# Patient Record
Sex: FEMALE | Race: WHITE | NOT HISPANIC OR LATINO | Employment: OTHER | URBAN - NONMETROPOLITAN AREA
[De-identification: names, ages, dates, MRNs, and addresses within clinical notes are randomized per-mention and may not be internally consistent; named-entity substitution may affect disease eponyms.]

---

## 2020-01-02 ENCOUNTER — OFFICE VISIT (OUTPATIENT)
Dept: FAMILY MEDICINE CLINIC | Facility: CLINIC | Age: 55
End: 2020-01-02

## 2020-01-02 VITALS
WEIGHT: 158.31 LBS | DIASTOLIC BLOOD PRESSURE: 74 MMHG | RESPIRATION RATE: 18 BRPM | HEIGHT: 61 IN | TEMPERATURE: 98.2 F | BODY MASS INDEX: 29.89 KG/M2 | SYSTOLIC BLOOD PRESSURE: 128 MMHG | HEART RATE: 79 BPM | OXYGEN SATURATION: 98 %

## 2020-01-02 DIAGNOSIS — Z23 NEED FOR INFLUENZA VACCINATION: ICD-10-CM

## 2020-01-02 DIAGNOSIS — K64.9 HEMORRHOIDS, UNSPECIFIED HEMORRHOID TYPE: ICD-10-CM

## 2020-01-02 DIAGNOSIS — Z00.00 ANNUAL PHYSICAL EXAM: ICD-10-CM

## 2020-01-02 DIAGNOSIS — I10 HYPERTENSION, UNSPECIFIED TYPE: ICD-10-CM

## 2020-01-02 DIAGNOSIS — I10 ESSENTIAL HYPERTENSION: ICD-10-CM

## 2020-01-02 DIAGNOSIS — M19.90 ARTHRITIS: ICD-10-CM

## 2020-01-02 DIAGNOSIS — F19.10 DRUG ABUSE, IV (HCC): Primary | ICD-10-CM

## 2020-01-02 PROCEDURE — 99203 OFFICE O/P NEW LOW 30 MIN: CPT | Performed by: STUDENT IN AN ORGANIZED HEALTH CARE EDUCATION/TRAINING PROGRAM

## 2020-01-02 PROCEDURE — 90686 IIV4 VACC NO PRSV 0.5 ML IM: CPT | Performed by: STUDENT IN AN ORGANIZED HEALTH CARE EDUCATION/TRAINING PROGRAM

## 2020-01-02 PROCEDURE — G0008 ADMIN INFLUENZA VIRUS VAC: HCPCS | Performed by: STUDENT IN AN ORGANIZED HEALTH CARE EDUCATION/TRAINING PROGRAM

## 2020-01-02 RX ORDER — LISINOPRIL 10 MG/1
10 TABLET ORAL 2 TIMES DAILY
COMMUNITY

## 2020-01-02 RX ORDER — MELOXICAM 7.5 MG/1
7.5 TABLET ORAL DAILY
Qty: 30 TABLET | Refills: 6 | Status: SHIPPED | OUTPATIENT
Start: 2020-01-02 | End: 2020-02-01

## 2020-01-02 RX ORDER — HYDROCHLOROTHIAZIDE 12.5 MG/1
12.5 TABLET ORAL DAILY
Qty: 30 TABLET | Refills: 0 | Status: SHIPPED | OUTPATIENT
Start: 2020-01-02 | End: 2020-02-01

## 2020-01-02 NOTE — PROGRESS NOTES
"                    Subjective   JudyConsuelo Escalante is a 54 y.o. female who presents for initial evaluation  for arthritis pain, annual labs, and to establish care.    Arthritis pain: Hanna has chronic back and hip pain. She endorses self-medicating for her pain which has included narcotics, methamphetamine and daily THC. She last used meth a few weeks ago. She has injected her drugs in the past. She says she has used aleve before for her pain but causes her stomach upset. She would like to get some pain medicine which is effective.     Annual labs: Hanna at first was requesting to have Hepatitis, HIV and annual labs drawn since she hadn't had these labs drawn in \"a long time.\" At the end of the visit, she said she had them drawn around [facility] in [place] and will sign release so we can get those here.    Establish care: Hanna recently moved from [place] back to [place] and is interested in establishing care. She would like to have a surgical consult to have her hemorrhoid removed.      Past Medical Hx:  Past Medical History:   Diagnosis Date   • Allergic    • Arthritis    • COPD (chronic obstructive pulmonary disease) (CMS/HCC)    • GERD (gastroesophageal reflux disease)    • Hypertension    • Infectious viral hepatitis    • Low back pain        Past Surgical Hx:  Past Surgical History:   Procedure Laterality Date   • CHOLECYSTECTOMY     • HYSTERECTOMY         Health Maintenance:  Health Maintenance   Topic Date Due   • MAMMOGRAM  1965   • TDAP/TD VACCINES (1 - Tdap) 09/30/1974   • PNEUMOCOCCAL VACCINE (19-64 MEDIUM RISK) (1 of 1 - PPSV23) 09/30/1982   • ZOSTER VACCINE (1 of 2) 09/30/2013   • INFLUENZA VACCINE  08/01/2019   • HEPATITIS C SCREENING  01/02/2020   • MEDICARE ANNUAL WELLNESS  01/02/2020   • PAP SMEAR  01/02/2020   • COLONOSCOPY  01/02/2020       Current Meds:    Current Outpatient Medications:   •  lisinopril (PRINIVIL,ZESTRIL) 10 MG tablet, Take 10 mg by mouth 2 (Two) Times a Day., " Disp: , Rfl:   •  hydroCHLOROthiazide (HYDRODIURIL) 12.5 MG tablet, Take 1 tablet by mouth Daily for 30 days., Disp: 30 tablet, Rfl: 0  •  meloxicam (MOBIC) 7.5 MG tablet, Take 1 tablet by mouth Daily for 30 days., Disp: 30 tablet, Rfl: 6  No current facility-administered medications for this visit.     Allergies:  Patient has no known allergies.    Family Hx:  Family History   Problem Relation Age of Onset   • Hypertension Mother    • Heart disease Mother    • Diabetes Father    • Cancer Father         Social History:  Social History     Socioeconomic History   • Marital status: Unknown     Spouse name: Not on file   • Number of children: Not on file   • Years of education: Not on file   • Highest education level: Not on file   Tobacco Use   • Smoking status: Current Every Day Smoker     Packs/day: 1.00     Types: Cigarettes   • Smokeless tobacco: Never Used   Substance and Sexual Activity   • Alcohol use: Not Currently   • Drug use: Yes     Types: Marijuana       Review of Systems  Review of Systems   Constitutional: Negative for activity change and appetite change.   HENT: Negative for congestion and ear pain.    Eyes: Negative for pain and discharge.   Respiratory: Negative for chest tightness and shortness of breath.    Cardiovascular: Negative for chest pain and palpitations.   Gastrointestinal: Negative for abdominal distention and abdominal pain.   Endocrine: Negative for cold intolerance and heat intolerance.   Genitourinary: Negative for difficulty urinating and dysuria.   Musculoskeletal: Positive for back pain and myalgias. Negative for arthralgias.   Skin: Negative for color change and rash.   Allergic/Immunologic: Negative for environmental allergies and food allergies.   Neurological: Negative for dizziness and headaches.   Hematological: Negative for adenopathy. Does not bruise/bleed easily.   Psychiatric/Behavioral: Negative for agitation and confusion.            Objective:     /74 (BP  "Location: Left arm, Patient Position: Sitting, Cuff Size: Adult)   Pulse 79   Temp 98.2 °F (36.8 °C) (Tympanic)   Resp 18   Ht 154.9 cm (61\")   Wt 71.8 kg (158 lb 5 oz)   SpO2 98%   BMI 29.91 kg/m²       Physical Exam   Constitutional: She is oriented to person, place, and time. She appears well-developed and well-nourished.   HENT:   Head: Normocephalic and atraumatic.   Eyes: Pupils are equal, round, and reactive to light. EOM are normal.   Neck: No JVD present. No tracheal deviation present. No thyromegaly present.   Cardiovascular: Normal rate, S1 normal, S2 normal, normal heart sounds and intact distal pulses.   Pulses:       Radial pulses are 2+ on the right side, and 2+ on the left side.        Dorsalis pedis pulses are 2+ on the right side, and 2+ on the left side.   Pulmonary/Chest: Effort normal and breath sounds normal.   Abdominal: Bowel sounds are normal.   Musculoskeletal: Normal range of motion.   Neurological: She is alert and oriented to person, place, and time. GCS eye subscore is 4. GCS verbal subscore is 5. GCS motor subscore is 6.   Skin: Skin is warm and dry. Capillary refill takes 2 to 3 seconds.   Psychiatric: She has a normal mood and affect. Her speech is normal and behavior is normal. Thought content normal.       Assessment/Plan:     Hanna was seen today for establish care.    Diagnoses and all orders for this visit:    Drug abuse, IV (CMS/[facility])  - Record release to be signed today for labs done at [facility].    Annual physical exam  - She has had her labs completed at [facility]. Will review those when available and order those which have not been done.   Essential hypertension  -     hydroCHLOROthiazide (HYDRODIURIL) 12.5 MG tablet; Take 1 tablet by mouth Daily for 30 days.    Arthritis  -     meloxicam (MOBIC) 7.5 MG tablet; Take 1 tablet by mouth Daily for 30 days.    Hemorrhoids, unspecified hemorrhoid type  -     Ambulatory Referral to General Surgery    Need for influenza " vaccination    -     influenza vac split quad (FLUZONE,FLUARIX,AFLURIA) injection 0.5 mL    Hypertension, unspecified type         Follow-up:     Return in about 2 weeks (around 1/16/2020) for Recheck.    Goals    Stay off of drugs         Preventative:    Vaccines Recommended at this visit:   Influenza    Vaccines Received at this visit:  Influenza    Screenings Recommended at this visit:  No Screenings offered today. Patient is up to date on all screenings at this time.     Screenings Ordered at this visit:  No screenings were offered today. Patient is up to date on all screenings.     Smoking Status:  Patient is current smoker. Patient is not interested in smoking cessation.    Alcohol Intake:  Occasional/rare    Patient's Body mass index is 29.91 kg/m². BMI is within normal parameters. No follow-up required..         RISK SCORE: 3

## 2020-01-02 NOTE — PATIENT INSTRUCTIONS
Sign the release for medical records for your labs from last month. Start taking the new medications and come back in 2 weeks and we will re-evaluate your BP and pain then.   Smoking and Musculoskeletal Health  Smoking is bad for your health. Most people know that smoking causes lung disease, heart disease, and cancer. But people may not realize that it also affects their bones, muscles, and joints (musculoskeletal system). When you smoke, the effects on your lungs and heart result in less oxygen for your musculoskeletal system. This can lead to poor bone and joint health.  How can smoking affect my musculoskeletal health?  Smoking can:  · Increase your risk of having weak, thin bones (osteoporosis). Elderly smokers are at higher risk for bone fractures related to osteoporosis.  · Decrease the ability of bone-forming cells to make and replace bone (in addition to reducing oxygen and blood flow).  · Reduce your body's ability to absorb calcium from your diet. Less calcium means weaker bones.  · Interfere with the breakdown of the female hormone estrogen. Smoking lowers estrogen, which is a hormone that helps keep bones strong. Women who smoke may have earlier menopause. Menopause is a risk factor for osteoporosis.  · Weaken the tissues that attach bones to muscles (tendons). This can lead to shoulder, back, and other joint injuries.  · Increase your risk of rheumatoid arthritis or make the condition worse if you already have it.  · Slow down healing and increase your risk of infection and other complications if you have a bone fracture or surgery that involves your musculoskeletal system.  · Make you get out of breath easily. This can keep you from getting the exercise you need to keep your bones and joints healthy.  · Decrease your appetite and body mass. You may lose weight and muscle strength. This can put you at higher risk for muscle injury, joint injury, and broken bones.  What actions can I take to prevent  musculoskeletal problems?  Quit smoking         · Do not start smoking. Quit if you already do. Even stopping later in life can improve musculoskeletal health.  · Do not use any products that contain nicotine or tobacco. Do not replace cigarette smoking with e-cigarettes. The safety of e-cigarettes is not known, and some may contain harmful chemicals.  · Make a plan to quit smoking and commit to it. Look for programs to help you, and ask your health care provider for recommendations and ideas.  · Talk with your health care provider about using nicotine replacement medicines to help you quit, such as gum, lozenges, patches, sprays, or pills.  Make other lifestyle changes    · Eat a healthy diet that includes calcium and vitamin D. These nutrients are important for bone health.  ? Calcium is found in dairy foods and green leafy vegetables.  ? Vitamin D is found in eggs, fish, and liver.  ? Many foods also have vitamin D and calcium added to them (are fortified).  ? Ask your health care provider if you would benefit from taking a supplement.  · Get out in the sunshine for a short time every day. This increases production of vitamin D.  · Get 30 minutes of exercise at least 5 days a week. Weight-bearing and strength exercises are best for musculoskeletal health. Ask your health care provider what type of exercise is safe for you.  · Do not drink alcohol if:  ? Your health care provider tells you not to drink.  ? You are pregnant, may be pregnant, or are planning to become pregnant.  · If you drink alcohol, limit how much you have:  ? 0-1 drink a day for women.  ? 0-2 drinks a day for men.  · Be aware of how much alcohol is in your drink. In the [place] one drink equals one 12 oz bottle of beer (355 mL), one 5 oz glass of wine (148 mL), or one 1½ oz glass of hard liquor (44 mL).  Where to find more information  You may find more information about smoking, musculoskeletal health, and quitting smoking from:  · American  Academy of Orthopaedic Surgeons: orthoinfo.aaos.org  · National Institutes of Health, Osteoporosis and Related Bone Diseases National Resource Center: bones.nih.gov  · HelpGuide.org: helpguide.org  · Smokefree.gov: smokefree.gov  · American Lung Association: lung.org  Contact a health care provider if:  · You need help to quit smoking.  Summary  · When you smoke, the effects on your lungs and heart result in less oxygen for your musculoskeletal system.  · Even stopping smoking later in life can improve musculoskeletal health.  · Do not use any products that contain nicotine or tobacco, such as cigarettes and e-cigarettes.  · If you need help quitting, ask your health care provider.  This information is not intended to replace advice given to you by your health care provider. Make sure you discuss any questions you have with your health care provider.  Document Released: 04/15/2019 Document Revised: 04/15/2019 Document Reviewed: 04/15/2019  [employer] Interactive Patient Education © 2019 [employer]

## 2020-01-09 ENCOUNTER — CONSULT (OUTPATIENT)
Dept: SURGERY | Facility: CLINIC | Age: 55
End: 2020-01-09

## 2020-01-09 VITALS
DIASTOLIC BLOOD PRESSURE: 90 MMHG | HEIGHT: 61 IN | BODY MASS INDEX: 30.78 KG/M2 | SYSTOLIC BLOOD PRESSURE: 142 MMHG | TEMPERATURE: 99.1 F | HEART RATE: 84 BPM | WEIGHT: 163 LBS

## 2020-01-09 DIAGNOSIS — K64.5 THROMBOSED EXTERNAL HEMORRHOID: ICD-10-CM

## 2020-01-09 DIAGNOSIS — Z12.11 SCREENING FOR MALIGNANT NEOPLASM OF COLON: Primary | ICD-10-CM

## 2020-01-09 PROCEDURE — 99203 OFFICE O/P NEW LOW 30 MIN: CPT | Performed by: SURGERY

## 2020-01-09 RX ORDER — DEXTROSE AND SODIUM CHLORIDE 5; .45 G/100ML; G/100ML
30 INJECTION, SOLUTION INTRAVENOUS CONTINUOUS PRN
Status: CANCELLED | OUTPATIENT
Start: 2020-01-21

## 2020-01-10 PROBLEM — Z12.11 SCREENING FOR MALIGNANT NEOPLASM OF COLON: Status: ACTIVE | Noted: 2020-01-10

## 2020-01-16 ENCOUNTER — OFFICE VISIT (OUTPATIENT)
Dept: FAMILY MEDICINE CLINIC | Facility: CLINIC | Age: 55
End: 2020-01-16

## 2020-01-16 VITALS
TEMPERATURE: 97.4 F | HEART RATE: 95 BPM | SYSTOLIC BLOOD PRESSURE: 128 MMHG | HEIGHT: 61 IN | WEIGHT: 163.5 LBS | DIASTOLIC BLOOD PRESSURE: 82 MMHG | BODY MASS INDEX: 30.87 KG/M2 | OXYGEN SATURATION: 98 %

## 2020-01-16 DIAGNOSIS — G47.00 INSOMNIA, UNSPECIFIED TYPE: ICD-10-CM

## 2020-01-16 DIAGNOSIS — F17.200 TOBACCO DEPENDENCE: ICD-10-CM

## 2020-01-16 DIAGNOSIS — R52 COMPLAINTS OF TOTAL BODY PAIN: Primary | ICD-10-CM

## 2020-01-16 PROCEDURE — 99213 OFFICE O/P EST LOW 20 MIN: CPT | Performed by: STUDENT IN AN ORGANIZED HEALTH CARE EDUCATION/TRAINING PROGRAM

## 2020-01-16 RX ORDER — LANOLIN ALCOHOL/MO/W.PET/CERES
3 CREAM (GRAM) TOPICAL NIGHTLY PRN
Qty: 30 TABLET | Refills: 1 | Status: SHIPPED | OUTPATIENT
Start: 2020-01-16 | End: 2020-02-15

## 2020-01-16 RX ORDER — TRAZODONE HYDROCHLORIDE 50 MG/1
50 TABLET ORAL NIGHTLY
Qty: 30 TABLET | Refills: 1 | Status: SHIPPED | OUTPATIENT
Start: 2020-01-16 | End: 2020-01-16

## 2020-01-16 RX ORDER — HYDROXYZINE PAMOATE 100 MG/1
100 CAPSULE ORAL NIGHTLY
Qty: 30 CAPSULE | Refills: 0 | Status: SHIPPED | OUTPATIENT
Start: 2020-01-16 | End: 2020-02-15

## 2020-01-16 NOTE — PROGRESS NOTES
"                    Subjective   JudyConsuelo Escalante is a 54 y.o. female who presents for follow up for insomnia, all-over body pain, and a pulmonology consult.    Insomnia: She continues to not be able to turn her brain off to sleep. She endorses practicing good sleep hygiene such as having no TV on, dark room and getting a bedtime routine. She has tried benadryl and trazodone in the past with better results with trazodone.    All-Over Body pain: She continues to endorse all-over body pain and says she can \"feel everything\" since she has gotten clean from illicit drug use. She says the mobic makes her stomach a bit upset, and has difficulty sleeping at night with the pain. She again asked for narcotics.    Pulmonology consult: She is a lifetime smoker and uses an albuterol inhaler at home for shortness of breath. She has never seen a pulmonologist or had a PFT.      Past Medical Hx:  Past Medical History:   Diagnosis Date   • Allergic    • Arthritis    • COPD (chronic obstructive pulmonary disease) (CMS/HCC)    • GERD (gastroesophageal reflux disease)    • Hypertension    • Infectious viral hepatitis    • Low back pain        Past Surgical Hx:  Past Surgical History:   Procedure Laterality Date   • CHOLECYSTECTOMY     • HYSTERECTOMY         Health Maintenance:  Health Maintenance   Topic Date Due   • MAMMOGRAM  1965   • PNEUMOCOCCAL VACCINE (19-64 MEDIUM RISK) (1 of 1 - PPSV23) 09/30/1982   • HEPATITIS C SCREENING  01/02/2020   • MEDICARE ANNUAL WELLNESS  01/02/2020   • PAP SMEAR  01/02/2020   • ZOSTER VACCINE (1 of 2) 01/16/2020 (Originally 9/30/2013)   • COLONOSCOPY  01/21/2020 (Originally 1/2/2020)   • TDAP/TD VACCINES (1 - Tdap) 01/16/2021 (Originally 9/30/1974)   • INFLUENZA VACCINE  Completed       Current Meds:    Current Outpatient Medications:   •  hydroCHLOROthiazide (HYDRODIURIL) 12.5 MG tablet, Take 1 tablet by mouth Daily for 30 days., Disp: 30 tablet, Rfl: 0  •  lisinopril (PRINIVIL,ZESTRIL) 10 MG " tablet, Take 10 mg by mouth 2 (Two) Times a Day., Disp: , Rfl:   •  meloxicam (MOBIC) 7.5 MG tablet, Take 1 tablet by mouth Daily for 30 days., Disp: 30 tablet, Rfl: 6  •  hydrOXYzine pamoate (VISTARIL) 100 MG capsule, Take 1 capsule by mouth Every Night for 30 days., Disp: 30 capsule, Rfl: 0  •  melatonin 3 MG tablet, Take 1 tablet by mouth At Night As Needed for Sleep for up to 30 days., Disp: 30 tablet, Rfl: 1    Allergies:  Patient has no known allergies.    Family Hx:  Family History   Problem Relation Age of Onset   • Hypertension Mother    • Heart disease Mother    • Diabetes Father    • Cancer Father         Social History:  Social History     Socioeconomic History   • Marital status: Unknown     Spouse name: Not on file   • Number of children: Not on file   • Years of education: Not on file   • Highest education level: Not on file   Tobacco Use   • Smoking status: Current Every Day Smoker     Packs/day: 1.00     Types: Cigarettes   • Smokeless tobacco: Never Used   Substance and Sexual Activity   • Alcohol use: Not Currently   • Drug use: Yes     Types: Marijuana       Review of Systems  Review of Systems   Constitutional: Negative for activity change and appetite change.   HENT: Negative for congestion and ear pain.    Eyes: Negative for pain and discharge.   Respiratory: Positive for wheezing (at times). Negative for chest tightness and shortness of breath.    Cardiovascular: Negative for chest pain and palpitations.   Gastrointestinal: Negative for abdominal distention and abdominal pain.   Endocrine: Negative for cold intolerance and heat intolerance.   Genitourinary: Negative for difficulty urinating and dysuria.   Musculoskeletal: Positive for arthralgias, back pain, gait problem and myalgias.   Skin: Negative for color change and rash.   Allergic/Immunologic: Negative for environmental allergies and food allergies.   Neurological: Negative for dizziness and headaches.   Hematological: Negative for  "adenopathy. Does not bruise/bleed easily.   Psychiatric/Behavioral: Negative for agitation and confusion.            Objective:     /82 (BP Location: Left arm, Patient Position: Sitting)   Pulse 95   Temp 97.4 °F (36.3 °C) (Tympanic)   Ht 154.9 cm (61\")   Wt 74.2 kg (163 lb 8 oz)   SpO2 98%   BMI 30.89 kg/m²       Physical Exam   Constitutional: She is oriented to person, place, and time. She appears well-developed and well-nourished.   HENT:   Head: Normocephalic and atraumatic.   Eyes: Pupils are equal, round, and reactive to light. EOM are normal.   Neck: No JVD present. No tracheal deviation present. No thyromegaly present.   Cardiovascular: Normal rate, S1 normal, S2 normal, normal heart sounds and intact distal pulses.   Pulses:       Radial pulses are 2+ on the right side, and 2+ on the left side.        Dorsalis pedis pulses are 2+ on the right side, and 2+ on the left side.   Pulmonary/Chest: Effort normal and breath sounds normal.   Abdominal: Bowel sounds are normal.   Musculoskeletal: Normal range of motion.   Neurological: She is alert and oriented to person, place, and time. GCS eye subscore is 4. GCS verbal subscore is 5. GCS motor subscore is 6.   Skin: Skin is warm and dry. Capillary refill takes 2 to 3 seconds.   Psychiatric: She has a normal mood and affect. Her speech is normal and behavior is normal. Thought content normal.       Assessment/Plan:     Hanna was seen today for addiction problem and arthritis. She will schedule another appointment for a PAP and Medicare wellness exam. She will sign the med release for labs she recently had drawn.     Diagnoses and all orders for this visit:    Complaints of total body pain   - Informed her we don't prescribe narcotics. She has not had good results from gabapentin in the past.  Tobacco dependence  -     Ambulatory Referral to Pulmonology to obtain PFTs and get recommendations for her suspected COPD    Insomnia, unspecified type  - Try the " new medications and keep practicing good sleep hygiene.   -     hydrOXYzine pamoate (VISTARIL) 100 MG capsule; Take 1 capsule by mouth Every Night for 30 days.  -     melatonin 3 MG tablet; Take 1 tablet by mouth At Night As Needed for Sleep for up to 30 days.         Follow-up:     No follow-ups on file.    Goals    Pain relief,          Preventative:    Vaccines Recommended at this visit:   No Vaccines recommended today. Patient is up to date on all vaccines.     Vaccines Received at this visit:  No Vaccines recommended today. Patient is up to date on all vaccines.     Screenings Recommended at this visit:  PAP Smear    Screenings Ordered at this visit:  PAP Smear    Smoking Status:  Patient is current smoker. Patient is not interested in smoking cessation.    Alcohol Intake:  Occasional/rare    Patient's Body mass index is 30.89 kg/m². BMI is above normal parameters. Recommendations include: exercise counseling.         RISK SCORE: 3   Koby Chao MD PGY-1    This document has been electronically signed by Koby Chao MD on January 16, 2020 4:42 PM

## 2020-01-21 ENCOUNTER — ANESTHESIA EVENT (OUTPATIENT)
Dept: GASTROENTEROLOGY | Facility: HOSPITAL | Age: 55
End: 2020-01-21

## 2020-01-21 ENCOUNTER — ANESTHESIA (OUTPATIENT)
Dept: GASTROENTEROLOGY | Facility: HOSPITAL | Age: 55
End: 2020-01-21

## 2020-01-21 ENCOUNTER — HOSPITAL ENCOUNTER (OUTPATIENT)
Facility: HOSPITAL | Age: 55
Setting detail: HOSPITAL OUTPATIENT SURGERY
Discharge: HOME | End: 2020-01-21
Attending: SURGERY | Admitting: SURGERY

## 2020-01-21 VITALS
BODY MASS INDEX: 30.28 KG/M2 | TEMPERATURE: 97.1 F | HEIGHT: 61 IN | DIASTOLIC BLOOD PRESSURE: 67 MMHG | OXYGEN SATURATION: 96 % | WEIGHT: 160.38 LBS | SYSTOLIC BLOOD PRESSURE: 93 MMHG | RESPIRATION RATE: 18 BRPM | HEART RATE: 74 BPM

## 2020-01-21 DIAGNOSIS — Z12.11 SCREENING FOR MALIGNANT NEOPLASM OF COLON: ICD-10-CM

## 2020-01-21 PROCEDURE — 25010000002 PROPOFOL 10 MG/ML EMULSION: Performed by: NURSE ANESTHETIST, CERTIFIED REGISTERED

## 2020-01-21 PROCEDURE — G0121 COLON CA SCRN NOT HI RSK IND: HCPCS | Performed by: SURGERY

## 2020-01-21 RX ORDER — DEXTROSE AND SODIUM CHLORIDE 5; .45 G/100ML; G/100ML
30 INJECTION, SOLUTION INTRAVENOUS CONTINUOUS PRN
Status: DISCONTINUED | OUTPATIENT
Start: 2020-01-21 | End: 2020-01-21 | Stop reason: HOSPADM

## 2020-01-21 RX ORDER — LIDOCAINE HYDROCHLORIDE 20 MG/ML
INJECTION, SOLUTION EPIDURAL; INFILTRATION; INTRACAUDAL; PERINEURAL AS NEEDED
Status: DISCONTINUED | OUTPATIENT
Start: 2020-01-21 | End: 2020-01-21 | Stop reason: SURG

## 2020-01-21 RX ORDER — PROPOFOL 10 MG/ML
VIAL (ML) INTRAVENOUS AS NEEDED
Status: DISCONTINUED | OUTPATIENT
Start: 2020-01-21 | End: 2020-01-21 | Stop reason: SURG

## 2020-01-21 RX ADMIN — PROPOFOL 50 MG: 10 INJECTION, EMULSION INTRAVENOUS at 08:49

## 2020-01-21 RX ADMIN — PROPOFOL 20 MG: 10 INJECTION, EMULSION INTRAVENOUS at 08:55

## 2020-01-21 RX ADMIN — PROPOFOL 30 MG: 10 INJECTION, EMULSION INTRAVENOUS at 08:57

## 2020-01-21 RX ADMIN — DEXTROSE AND SODIUM CHLORIDE 30 ML/HR: 5; 450 INJECTION, SOLUTION INTRAVENOUS at 08:11

## 2020-01-21 RX ADMIN — PROPOFOL 20 MG: 10 INJECTION, EMULSION INTRAVENOUS at 09:00

## 2020-01-21 RX ADMIN — PROPOFOL 50 MG: 10 INJECTION, EMULSION INTRAVENOUS at 08:53

## 2020-01-21 RX ADMIN — LIDOCAINE HYDROCHLORIDE 50 MG: 20 INJECTION, SOLUTION EPIDURAL; INFILTRATION; INTRACAUDAL; PERINEURAL at 08:49

## 2020-01-21 NOTE — ANESTHESIA POSTPROCEDURE EVALUATION
Patient: Hanna Escalante    Procedure Summary     Date:  01/21/20 Room / Location:  [facility] ENDOSCOPY 1 / [facility] ENDOSCOPY    Anesthesia Start:  0846 Anesthesia Stop:  0907    Procedure:  COLONOSCOPY (N/A ) Diagnosis:       Screening for malignant neoplasm of colon      (Screening for malignant neoplasm of colon [Z12.11])    Surgeon:  Joshua Perry MD Provider:  Saloni Taylor CRNA    Anesthesia Type:  MAC ASA Status:  3          Anesthesia Type: MAC    Vitals  No vitals data found for the desired time range.          Post Anesthesia Care and Evaluation    Patient location during evaluation: bedside  Patient participation: complete - patient participated  Level of consciousness: sleepy but conscious  Pain score: 0  Pain management: adequate  Airway patency: patent  Anesthetic complications: No anesthetic complications  PONV Status: none  Cardiovascular status: acceptable and hemodynamically stable  Respiratory status: acceptable  Hydration status: acceptable  Post Neuraxial Block status: Motor and sensory function returned to baseline

## 2020-01-21 NOTE — ANESTHESIA PREPROCEDURE EVALUATION
Anesthesia Evaluation     Patient summary reviewed   NPO Solid Status: > 8 hours  NPO Liquid Status: > 4 hours           Airway   Mallampati: I  TM distance: >3 FB  Neck ROM: full  No difficulty expected  Dental    (+) edentulous    Pulmonary - normal exam   (+) a smoker Current Abstained day of surgery, COPD mild,   Cardiovascular - normal exam  Exercise tolerance: good (4-7 METS)    (+) hypertension well controlled,       Neuro/Psych- negative ROS  GI/Hepatic/Renal/Endo    (+)  GERD,  hepatitis, liver disease,     Musculoskeletal     Abdominal  - normal exam   Substance History      OB/GYN          Other   arthritis,                    Anesthesia Plan    ASA 3     MAC     intravenous induction     Anesthetic plan, all risks, benefits, and alternatives have been provided, discussed and informed consent has been obtained with: patient.    Plan discussed with CRNA.

## 2020-01-21 NOTE — H&P
Chief Complaint: Need for Screening Colonoscopy, hemorrhoids    Hanna Escalante is a 54 y.o. female referred today for evaluation for colonoscopy.  She notes no change in bowel habits, no blood in the stool. She was seen in the office for possible hemorrhoids and potential prolapse.    Prior Colonoscopy:no  Prior Polyps:no  Family History of Colon Cancer:no  On anticoagulation/antiplatelets:no    Past Surgical History:   Procedure Laterality Date   • CHOLECYSTECTOMY     • HYSTERECTOMY       Past Medical History:   Diagnosis Date   • Allergic    • Arthritis    • COPD (chronic obstructive pulmonary disease) (CMS/HCC)    • GERD (gastroesophageal reflux disease)    • Hypertension    • Infectious viral hepatitis    • Low back pain      Social History     Socioeconomic History   • Marital status: Single     Spouse name: Not on file   • Number of children: Not on file   • Years of education: Not on file   • Highest education level: Not on file   Tobacco Use   • Smoking status: Current Every Day Smoker     Packs/day: 1.00     Types: Cigarettes   • Smokeless tobacco: Never Used   Substance and Sexual Activity   • Alcohol use: Not Currently   • Drug use: Yes     Types: Marijuana     No current facility-administered medications on file prior to encounter.      Current Outpatient Medications on File Prior to Encounter   Medication Sig Dispense Refill   • hydroCHLOROthiazide (HYDRODIURIL) 12.5 MG tablet Take 1 tablet by mouth Daily for 30 days. 30 tablet 0   • lisinopril (PRINIVIL,ZESTRIL) 10 MG tablet Take 10 mg by mouth 2 (Two) Times a Day.     • meloxicam (MOBIC) 7.5 MG tablet Take 1 tablet by mouth Daily for 30 days. 30 tablet 6     No Known Allergies      Review of Systems   Constitutional: Negative for chills and fever.   Gastrointestinal: Negative for abdominal pain, blood in stool, constipation and diarrhea.       Vitals:    01/21/20 0805   BP: 120/80   Pulse: 83   Resp: 18   Temp: 97.4 °F (36.3 °C)   SpO2: 98%        Physical Exam:       General Appearance:    Alert, cooperative, in no acute distress   Head:    Normocephalic, without obvious abnormality, atraumatic   Eyes:            Lids and lashes normal, conjunctivae and sclerae normal, no   icterus, no pallor, corneas clear   Ears:    Ears appear intact with no abnormalities noted   Neck:   Trachea midline   Lungs:     Clear to auscultation,respirations regular, even and                  unlabored    Heart:    Regular rhythm and normal rate, normal S1 and S2, no            murmur, no gallop, no rub, no click   Abdomen:     Soft, nontender   Extremities:   Moves all extremities well, no edema, no cyanosis, no             redness   Pulses:   Pulses palpable and equal bilaterally   Neurologic:   Cranial nerves 2 - 12 grossly intact, sensation intact       Assessment     In need of screening colonoscopy.    Plan     Risks, benefits, rationale and prep for colonoscopy have been discussed with the patient.  The patient indicates understanding of these issues and agrees with the plan.          This document has been electronically signed by Joshua Perry MD on January 21, 2020 8:42 AM

## 2020-01-27 NOTE — PROGRESS NOTES
CHIEF COMPLAINT:    Hemorrhoids    HISTORY OF PRESENT ILLNESS:    Hanna Escalante is a 54 y.o. female who presents with approximately 3 weeks of perianal pain which is resolving.  She was told that she likely has an external hemorrhoid causing the symptoms and was referred to me for further care.  She has not had a prior colonoscopy.  She reports no blood in her stool.  She does have constant pain in the perianal region again which is resolving.  The pain is typically worse after bowel movements.    Past Medical History:   Diagnosis Date   • Allergic    • Arthritis    • COPD (chronic obstructive pulmonary disease) (CMS/HCC)    • GERD (gastroesophageal reflux disease)    • Hypertension    • Infectious viral hepatitis    • Low back pain        Past Surgical History:   Procedure Laterality Date   • CHOLECYSTECTOMY     • COLONOSCOPY N/A 1/21/2020    Procedure: COLONOSCOPY;  Surgeon: Joshua Perry MD;  Location: [facility] ENDOSCOPY;  Service: General   • HYSTERECTOMY         Prior to Admission medications    Medication Sig Start Date End Date Taking? Authorizing Provider   hydroCHLOROthiazide (HYDRODIURIL) 12.5 MG tablet Take 1 tablet by mouth Daily for 30 days. 1/2/20 2/1/20 Yes Alin Molina MD   lisinopril (PRINIVIL,ZESTRIL) 10 MG tablet Take 10 mg by mouth 2 (Two) Times a Day.   Yes Provider, MD Lianet   hydrOXYzine pamoate (VISTARIL) 100 MG capsule Take 1 capsule by mouth Every Night for 30 days. 1/16/20 2/15/20  Juwan Wei MD   melatonin 3 MG tablet Take 1 tablet by mouth At Night As Needed for Sleep for up to 30 days. 1/16/20 2/15/20  Juwan Wei MD   meloxicam (MOBIC) 7.5 MG tablet Take 1 tablet by mouth Daily for 30 days. 1/2/20 2/1/20  Alin Molina MD       No Known Allergies    Family History   Problem Relation Age of Onset   • Hypertension Mother    • Heart disease Mother    • Diabetes Father    • Cancer Father        Social History     Socioeconomic History   • Marital  "status: Single     Spouse name: Not on file   • Number of children: Not on file   • Years of education: Not on file   • Highest education level: Not on file   Tobacco Use   • Smoking status: Current Every Day Smoker     Packs/day: 1.00     Types: Cigarettes   • Smokeless tobacco: Never Used   Substance and Sexual Activity   • Alcohol use: Not Currently   • Drug use: Yes     Types: Marijuana       Review of Systems   Constitutional: Negative for appetite change, chills, fever and unexpected weight change.   HENT: Negative for hearing loss, nosebleeds and trouble swallowing.    Eyes: Negative for visual disturbance.   Respiratory: Negative for apnea, cough, choking, chest tightness, shortness of breath, wheezing and stridor.    Cardiovascular: Negative for chest pain, palpitations and leg swelling.   Gastrointestinal: Positive for rectal pain. Negative for abdominal distention, abdominal pain, blood in stool, constipation, diarrhea, nausea and vomiting.   Endocrine: Negative for cold intolerance, heat intolerance, polydipsia, polyphagia and polyuria.   Genitourinary: Negative for difficulty urinating, dysuria, frequency, hematuria and urgency.   Musculoskeletal: Negative for arthralgias, back pain, myalgias and neck pain.   Skin: Negative for color change, pallor and rash.   Allergic/Immunologic: Negative for immunocompromised state.   Neurological: Negative for dizziness, seizures, syncope, light-headedness, numbness and headaches.   Hematological: Negative for adenopathy.   Psychiatric/Behavioral: Negative for suicidal ideas. The patient is not nervous/anxious.        Objective     /90   Pulse 84   Temp 99.1 °F (37.3 °C) (Oral)   Ht 154.9 cm (61\")   Wt 73.9 kg (163 lb)   BMI 30.80 kg/m²     Physical Exam   Constitutional: She is oriented to person, place, and time. She appears well-developed and well-nourished. No distress.   HENT:   Head: Normocephalic and atraumatic.   Eyes: Pupils are equal, round, and " reactive to light. Conjunctivae and EOM are normal. Right eye exhibits no discharge. Left eye exhibits no discharge.   Neck: Normal range of motion. Neck supple. No JVD present. No tracheal deviation present. No thyromegaly present.   Cardiovascular: Normal rate, regular rhythm and normal heart sounds. Exam reveals no gallop and no friction rub.   No murmur heard.  Pulmonary/Chest: Effort normal and breath sounds normal. No respiratory distress. She has no wheezes. She has no rales. She exhibits no tenderness.   Abdominal: Soft. She exhibits no distension and no mass. There is no tenderness. There is no rebound and no guarding. No hernia.   Genitourinary:         Musculoskeletal: Normal range of motion. She exhibits no edema, tenderness or deformity.   Neurological: She is alert and oriented to person, place, and time. No cranial nerve deficit.   Skin: Skin is warm and dry. No rash noted. She is not diaphoretic. No erythema. No pallor.   Psychiatric: She has a normal mood and affect. Her behavior is normal. Judgment and thought content normal.         ASSESSMENT:    Resolving thrombosed external hemorrhoid    PLAN:    She likely has had a thrombosed external hemorrhoid for several weeks.  On exam the hemorrhoid appears to be fairly small.  At this time I have recommended that she continue conservative measures such as warm soaks and NSAIDs.  Additionally, she has not had a screening colonoscopy I recommended that she undergo screening colonoscopy.  She is agreeable to this.  The risks and benefits of colonoscopy were discussed with her and she is scheduled for this on 1/21/2020.          This document has been electronically signed by Joshua Perry MD on January 27, 2020 4:52 PM

## 2020-01-28 DIAGNOSIS — R06.02 SHORTNESS OF BREATH: Primary | ICD-10-CM

## 2020-01-29 ENCOUNTER — APPOINTMENT (OUTPATIENT)
Dept: GENERAL RADIOLOGY | Facility: HOSPITAL | Age: 55
End: 2020-01-29

## 2020-02-06 ENCOUNTER — TELEPHONE (OUTPATIENT)
Dept: GENERAL RADIOLOGY | Facility: HOSPITAL | Age: 55
End: 2020-02-06

## 2020-02-06 ENCOUNTER — TELEPHONE (OUTPATIENT)
Dept: PULMONOLOGY | Facility: CLINIC | Age: 55
End: 2020-02-06

## 2020-05-18 ENCOUNTER — TELEPHONE (OUTPATIENT)
Dept: FAMILY MEDICINE CLINIC | Facility: CLINIC | Age: 55
End: 2020-05-18

## 2020-05-18 ENCOUNTER — DOCUMENTATION (OUTPATIENT)
Dept: OBSTETRICS AND GYNECOLOGY | Facility: CLINIC | Age: 55
End: 2020-05-18

## 2020-05-18 NOTE — PROGRESS NOTES
Patient called in the office requesting an inhaler.  Unable to determine if patient has been on inhaler before after reviewing the chart.  She is a patient of Dr. Huffman, pulmonology, but there are no inhalers on her medication list.  Attempted to call patient several times but the number provided was invalid.  Had the  attempt to call the backup number, but that  would not forward the number to the patient.  Requested  staff to have patient make an appointment for an inhaler should she call again.    Koby Chao MD PGY-1      This document has been electronically signed by Koby Chao MD on May 18, 2020 16:30      Part of this note may be an electronic transcription/translation of spoken language to printed text using the Dragon Dictation System.

## 2023-09-13 ENCOUNTER — APPOINTMENT (OUTPATIENT)
Dept: CT IMAGING | Facility: HOSPITAL | Age: 58
End: 2023-09-13
Payer: MEDICARE

## 2023-09-13 ENCOUNTER — HOSPITAL ENCOUNTER (EMERGENCY)
Facility: HOSPITAL | Age: 58
Discharge: HOME | End: 2023-09-13
Attending: STUDENT IN AN ORGANIZED HEALTH CARE EDUCATION/TRAINING PROGRAM
Payer: MEDICARE

## 2023-09-13 VITALS
DIASTOLIC BLOOD PRESSURE: 93 MMHG | OXYGEN SATURATION: 91 % | WEIGHT: 160 LBS | SYSTOLIC BLOOD PRESSURE: 179 MMHG | BODY MASS INDEX: 30.21 KG/M2 | TEMPERATURE: 97.7 F | RESPIRATION RATE: 20 BRPM | HEART RATE: 74 BPM | HEIGHT: 61 IN

## 2023-09-13 DIAGNOSIS — F15.10 METHAMPHETAMINE ABUSE: ICD-10-CM

## 2023-09-13 DIAGNOSIS — R10.9 FLANK PAIN: Primary | ICD-10-CM

## 2023-09-13 LAB
ALBUMIN SERPL-MCNC: 4.2 G/DL (ref 3.5–5.2)
ALBUMIN/GLOB SERPL: 1.1 G/DL
ALP SERPL-CCNC: 140 U/L (ref 39–117)
ALT SERPL W P-5'-P-CCNC: 54 U/L (ref 1–33)
AMPHET+METHAMPHET UR QL: POSITIVE
AMPHETAMINES UR QL: POSITIVE
ANION GAP SERPL CALCULATED.3IONS-SCNC: 11 MMOL/L (ref 5–15)
AST SERPL-CCNC: 45 U/L (ref 1–32)
BACTERIA UR QL AUTO: ABNORMAL /HPF
BARBITURATES UR QL SCN: NEGATIVE
BASOPHILS # BLD AUTO: 0.09 10*3/MM3 (ref 0–0.2)
BASOPHILS NFR BLD AUTO: 1.4 % (ref 0–1.5)
BENZODIAZ UR QL SCN: NEGATIVE
BILIRUB SERPL-MCNC: 0.7 MG/DL (ref 0–1.2)
BILIRUB UR QL STRIP: NEGATIVE
BUN SERPL-MCNC: 15 MG/DL (ref 6–20)
BUN/CREAT SERPL: 13.8 (ref 7–25)
BUPRENORPHINE SERPL-MCNC: NEGATIVE NG/ML
CALCIUM SPEC-SCNC: 9.8 MG/DL (ref 8.6–10.5)
CANNABINOIDS SERPL QL: POSITIVE
CHLORIDE SERPL-SCNC: 102 MMOL/L (ref 98–107)
CLARITY UR: ABNORMAL
CO2 SERPL-SCNC: 27 MMOL/L (ref 22–29)
COCAINE UR QL: NEGATIVE
COLOR UR: YELLOW
CREAT SERPL-MCNC: 1.09 MG/DL (ref 0.57–1)
DEPRECATED RDW RBC AUTO: 39.8 FL (ref 37–54)
EGFRCR SERPLBLD CKD-EPI 2021: 58.6 ML/MIN/1.73
EOSINOPHIL # BLD AUTO: 0.22 10*3/MM3 (ref 0–0.4)
EOSINOPHIL NFR BLD AUTO: 3.5 % (ref 0.3–6.2)
ERYTHROCYTE [DISTWIDTH] IN BLOOD BY AUTOMATED COUNT: 12.4 % (ref 12.3–15.4)
FENTANYL UR-MCNC: NEGATIVE NG/ML
GLOBULIN UR ELPH-MCNC: 3.9 GM/DL
GLUCOSE SERPL-MCNC: 199 MG/DL (ref 65–99)
GLUCOSE UR STRIP-MCNC: NEGATIVE MG/DL
HCT VFR BLD AUTO: 52 % (ref 34–46.6)
HGB BLD-MCNC: 17.5 G/DL (ref 12–15.9)
HGB UR QL STRIP.AUTO: NEGATIVE
HOLD SPECIMEN: NORMAL
HOLD SPECIMEN: NORMAL
HYALINE CASTS UR QL AUTO: ABNORMAL /LPF
IMM GRANULOCYTES # BLD AUTO: 0.03 10*3/MM3 (ref 0–0.05)
IMM GRANULOCYTES NFR BLD AUTO: 0.5 % (ref 0–0.5)
KETONES UR QL STRIP: NEGATIVE
LEUKOCYTE ESTERASE UR QL STRIP.AUTO: ABNORMAL
LIPASE SERPL-CCNC: 27 U/L (ref 13–60)
LYMPHOCYTES # BLD AUTO: 1.66 10*3/MM3 (ref 0.7–3.1)
LYMPHOCYTES NFR BLD AUTO: 26.2 % (ref 19.6–45.3)
MAGNESIUM SERPL-MCNC: 1.9 MG/DL (ref 1.6–2.6)
MCH RBC QN AUTO: 29.4 PG (ref 26.6–33)
MCHC RBC AUTO-ENTMCNC: 33.7 G/DL (ref 31.5–35.7)
MCV RBC AUTO: 87.2 FL (ref 79–97)
METHADONE UR QL SCN: NEGATIVE
MONOCYTES # BLD AUTO: 0.27 10*3/MM3 (ref 0.1–0.9)
MONOCYTES NFR BLD AUTO: 4.3 % (ref 5–12)
NEUTROPHILS NFR BLD AUTO: 4.06 10*3/MM3 (ref 1.7–7)
NEUTROPHILS NFR BLD AUTO: 64.1 % (ref 42.7–76)
NITRITE UR QL STRIP: NEGATIVE
NRBC BLD AUTO-RTO: 0 /100 WBC (ref 0–0.2)
OPIATES UR QL: POSITIVE
OXYCODONE UR QL SCN: NEGATIVE
PCP UR QL SCN: NEGATIVE
PH UR STRIP.AUTO: 5.5 [PH] (ref 5–9)
PLATELET # BLD AUTO: 231 10*3/MM3 (ref 140–450)
PMV BLD AUTO: 9.9 FL (ref 6–12)
POTASSIUM SERPL-SCNC: 4.3 MMOL/L (ref 3.5–5.2)
PROPOXYPH UR QL: NEGATIVE
PROT SERPL-MCNC: 8.1 G/DL (ref 6–8.5)
PROT UR QL STRIP: NEGATIVE
RBC # BLD AUTO: 5.96 10*6/MM3 (ref 3.77–5.28)
RBC # UR STRIP: ABNORMAL /HPF
REF LAB TEST METHOD: ABNORMAL
SODIUM SERPL-SCNC: 140 MMOL/L (ref 136–145)
SP GR UR STRIP: 1.02 (ref 1–1.03)
SQUAMOUS #/AREA URNS HPF: ABNORMAL /HPF
TRICYCLICS UR QL SCN: NEGATIVE
UROBILINOGEN UR QL STRIP: ABNORMAL
WBC # UR STRIP: ABNORMAL /HPF
WBC NRBC COR # BLD: 6.33 10*3/MM3 (ref 3.4–10.8)
WHOLE BLOOD HOLD COAG: NORMAL

## 2023-09-13 PROCEDURE — 93005 ELECTROCARDIOGRAM TRACING: CPT | Performed by: STUDENT IN AN ORGANIZED HEALTH CARE EDUCATION/TRAINING PROGRAM

## 2023-09-13 PROCEDURE — 99284 EMERGENCY DEPT VISIT MOD MDM: CPT

## 2023-09-13 PROCEDURE — 25010000002 LORAZEPAM PER 2 MG

## 2023-09-13 PROCEDURE — 74176 CT ABD & PELVIS W/O CONTRAST: CPT

## 2023-09-13 PROCEDURE — 81001 URINALYSIS AUTO W/SCOPE: CPT | Performed by: STUDENT IN AN ORGANIZED HEALTH CARE EDUCATION/TRAINING PROGRAM

## 2023-09-13 PROCEDURE — 93010 ELECTROCARDIOGRAM REPORT: CPT | Performed by: INTERNAL MEDICINE

## 2023-09-13 PROCEDURE — 36415 COLL VENOUS BLD VENIPUNCTURE: CPT

## 2023-09-13 PROCEDURE — 80053 COMPREHEN METABOLIC PANEL: CPT | Performed by: STUDENT IN AN ORGANIZED HEALTH CARE EDUCATION/TRAINING PROGRAM

## 2023-09-13 PROCEDURE — 25010000002 KETOROLAC TROMETHAMINE PER 15 MG: Performed by: STUDENT IN AN ORGANIZED HEALTH CARE EDUCATION/TRAINING PROGRAM

## 2023-09-13 PROCEDURE — 85025 COMPLETE CBC W/AUTO DIFF WBC: CPT | Performed by: STUDENT IN AN ORGANIZED HEALTH CARE EDUCATION/TRAINING PROGRAM

## 2023-09-13 PROCEDURE — 83735 ASSAY OF MAGNESIUM: CPT | Performed by: STUDENT IN AN ORGANIZED HEALTH CARE EDUCATION/TRAINING PROGRAM

## 2023-09-13 PROCEDURE — 96372 THER/PROPH/DIAG INJ SC/IM: CPT

## 2023-09-13 PROCEDURE — 83690 ASSAY OF LIPASE: CPT | Performed by: STUDENT IN AN ORGANIZED HEALTH CARE EDUCATION/TRAINING PROGRAM

## 2023-09-13 PROCEDURE — 25010000002 HYDROMORPHONE 1 MG/ML SOLUTION: Performed by: STUDENT IN AN ORGANIZED HEALTH CARE EDUCATION/TRAINING PROGRAM

## 2023-09-13 PROCEDURE — 80307 DRUG TEST PRSMV CHEM ANLYZR: CPT | Performed by: STUDENT IN AN ORGANIZED HEALTH CARE EDUCATION/TRAINING PROGRAM

## 2023-09-13 PROCEDURE — 25010000002 ONDANSETRON PER 1 MG: Performed by: STUDENT IN AN ORGANIZED HEALTH CARE EDUCATION/TRAINING PROGRAM

## 2023-09-13 RX ORDER — LORAZEPAM 2 MG/ML
0.5 INJECTION INTRAMUSCULAR ONCE
Status: COMPLETED | OUTPATIENT
Start: 2023-09-13 | End: 2023-09-13

## 2023-09-13 RX ORDER — SODIUM CHLORIDE 0.9 % (FLUSH) 0.9 %
10 SYRINGE (ML) INJECTION AS NEEDED
Status: DISCONTINUED | OUTPATIENT
Start: 2023-09-13 | End: 2023-09-13 | Stop reason: HOSPADM

## 2023-09-13 RX ORDER — METHOCARBAMOL 750 MG/1
1500 TABLET, FILM COATED ORAL ONCE
Status: COMPLETED | OUTPATIENT
Start: 2023-09-13 | End: 2023-09-13

## 2023-09-13 RX ORDER — HYDRALAZINE HYDROCHLORIDE 50 MG/1
50 TABLET, FILM COATED ORAL ONCE
Status: COMPLETED | OUTPATIENT
Start: 2023-09-13 | End: 2023-09-13

## 2023-09-13 RX ORDER — KETOROLAC TROMETHAMINE 30 MG/ML
30 INJECTION, SOLUTION INTRAMUSCULAR; INTRAVENOUS ONCE
Status: COMPLETED | OUTPATIENT
Start: 2023-09-13 | End: 2023-09-13

## 2023-09-13 RX ORDER — ONDANSETRON 2 MG/ML
4 INJECTION INTRAMUSCULAR; INTRAVENOUS ONCE
Status: COMPLETED | OUTPATIENT
Start: 2023-09-13 | End: 2023-09-13

## 2023-09-13 RX ORDER — LORAZEPAM 2 MG/ML
INJECTION INTRAMUSCULAR
Status: COMPLETED
Start: 2023-09-13 | End: 2023-09-13

## 2023-09-13 RX ORDER — HYDRALAZINE HYDROCHLORIDE 20 MG/ML
20 INJECTION INTRAMUSCULAR; INTRAVENOUS ONCE
Status: DISCONTINUED | OUTPATIENT
Start: 2023-09-13 | End: 2023-09-13 | Stop reason: HOSPADM

## 2023-09-13 RX ORDER — LISINOPRIL 5 MG/1
10 TABLET ORAL ONCE
Status: COMPLETED | OUTPATIENT
Start: 2023-09-13 | End: 2023-09-13

## 2023-09-13 RX ADMIN — METHOCARBAMOL 1500 MG: 750 TABLET, FILM COATED ORAL at 14:24

## 2023-09-13 RX ADMIN — HYDRALAZINE HYDROCHLORIDE 50 MG: 50 TABLET, FILM COATED ORAL at 14:44

## 2023-09-13 RX ADMIN — KETOROLAC TROMETHAMINE 30 MG: 30 INJECTION, SOLUTION INTRAMUSCULAR; INTRAVENOUS at 14:24

## 2023-09-13 RX ADMIN — ONDANSETRON 4 MG: 2 INJECTION INTRAMUSCULAR; INTRAVENOUS at 13:36

## 2023-09-13 RX ADMIN — LORAZEPAM 0.5 MG: 2 INJECTION INTRAMUSCULAR at 13:36

## 2023-09-13 RX ADMIN — LORAZEPAM 0.5 MG: 2 INJECTION INTRAMUSCULAR; INTRAVENOUS at 13:36

## 2023-09-13 RX ADMIN — HYDROMORPHONE HYDROCHLORIDE 0.5 MG: 1 INJECTION, SOLUTION INTRAMUSCULAR; INTRAVENOUS; SUBCUTANEOUS at 13:35

## 2023-09-13 RX ADMIN — LISINOPRIL 10 MG: 5 TABLET ORAL at 14:21

## 2023-09-13 NOTE — ED PROVIDER NOTES
Subjective   History of Present Illness  Patient presents with left flank pain radiating to her left lower abdomen starting prior to arrival.  Patient arrived via EMS shortly after she felt like something busted in her back like antifreeze in his radiating through her body.  She says it has not gotten to her head or brain yet but it is coming up to her chest.  Patient is in a lot of distress and unable to sit still for exam.  She denies nausea or vomiting, fever or chills, or dysuria.    Review of Systems   Gastrointestinal:  Positive for abdominal pain.   Genitourinary:  Positive for flank pain (left).   Psychiatric/Behavioral:  The patient is nervous/anxious.    All other systems reviewed and are negative.    Past Medical History:   Diagnosis Date    Allergic     Arthritis     COPD (chronic obstructive pulmonary disease)     GERD (gastroesophageal reflux disease)     Hypertension     Infectious viral hepatitis     Low back pain        No Known Allergies    Past Surgical History:   Procedure Laterality Date    CHOLECYSTECTOMY      COLONOSCOPY N/A 1/21/2020    Procedure: COLONOSCOPY;  Surgeon: Joshua Perry MD;  Location: [facility] ENDOSCOPY;  Service: General    HYSTERECTOMY         Family History   Problem Relation Age of Onset    Hypertension Mother     Heart disease Mother     Diabetes Father     Cancer Father        Social History     Socioeconomic History    Marital status: Single   Tobacco Use    Smoking status: Every Day     Packs/day: 1.00     Types: Cigarettes    Smokeless tobacco: Never   Substance and Sexual Activity    Alcohol use: Not Currently    Drug use: Yes     Types: Marijuana           Objective   Physical Exam  Vitals and nursing note reviewed.   Constitutional:       General: She is in acute distress.   HENT:      Mouth/Throat:      Mouth: Mucous membranes are moist.   Cardiovascular:      Heart sounds: Normal heart sounds.   Pulmonary:      Breath sounds: Normal breath sounds.    Abdominal:      Tenderness: There is abdominal tenderness (left lower quad). There is left CVA tenderness.   Musculoskeletal:         General: Normal range of motion.   Neurological:      General: No focal deficit present.      Mental Status: She is oriented to person, place, and time.       ECG 12 Lead      Date/Time: 9/13/2023 4:43 PM  Performed by: Koby Chao MD  Authorized by: Koby Chao MD   Interpreted by physician  Comparison: not compared with previous ECG   Previous ECG: no previous ECG available  Rhythm: sinus rhythm  Comments: Normal sinus rhythm ventricular rate 74 bpm, QRS 86 ms, QTc 439 ms, T wave inversion in lead V1, T wave flattening in lead aVL.  No ST elevations.  No STEMI.  This is my independent interpretation.             ED Course  ED Course as of 09/13/23 2316   Wed Sep 13, 2023   1410 Patient remains hypertensive.  She endorses not taking her blood pressure medicine this morning.  Review of her med list shows lisinopril 10 mg.  This was ordered.  Will reevaluate shortly for additional need for as needed medicine. [RUDY]   1526 Patient continues to be hypertensive.  Pain is better controlled.  Will attempt IV access again for IV antihypertensive medication. [RUDY]      ED Course User Index  [RUDY] Koby Chao MD           Vitals:    09/13/23 1434 09/13/23 1516 09/13/23 1545 09/13/23 1700   BP: (!) 223/125 (!) 202/120 (!) 185/92 179/93   BP Location: Right arm Left arm     Patient Position: Lying Lying     Pulse:  74 74    Resp:  18 20    Temp:       TempSrc:       SpO2:  94% 92% 91%   Weight:       Height:          Lab Results (last 24 hours)       Procedure Component Value Units Date/Time    Fentanyl, Urine - Urine, Clean Catch [[id number]]  (Normal) Collected: 09/13/23 1515    Specimen: Urine, Clean Catch Updated: 09/13/23 1620     Fentanyl, Urine Negative    Narrative:      Negative Threshold:      Fentanyl 5 ng/mL     The normal value for the drug tested is  negative. This report includes final unconfirmed screening results to be used for medical treatment purposes only. Unconfirmed results must not be used for non-medical purposes such as employment or legal testing. Clinical consideration should be applied to any drug of abuse test, particularly when unconfirmed results are used.           Urine Drug Screen - Urine, Clean Catch [[id number]]  (Abnormal) Collected: 09/13/23 1515    Specimen: Urine, Clean Catch Updated: 09/13/23 1620     THC, Screen, Urine Positive     Phencyclidine (PCP), Urine Negative     Cocaine Screen, Urine Negative     Methamphetamine, Ur Positive     Opiate Screen Positive     Amphetamine Screen, Urine Positive     Benzodiazepine Screen, Urine Negative     Tricyclic Antidepressants Screen Negative     Methadone Screen, Urine Negative     Barbiturates Screen, Urine Negative     Oxycodone Screen, Urine Negative     Propoxyphene Screen Negative     Buprenorphine, Screen, Urine Negative    Narrative:      Cutoff For Drugs Screened:    Amphetamines               500 ng/ml  Barbiturates               200 ng/ml  Benzodiazepines            150 ng/ml  Cocaine                    150 ng/ml  Methadone                  200 ng/ml  Opiates                    100 ng/ml  Phencyclidine               25 ng/ml  THC                            50 ng/ml  Methamphetamine            500 ng/ml  Tricyclic Antidepressants  300 ng/ml  Oxycodone                  100 ng/ml  Propoxyphene               300 ng/ml  Buprenorphine               10 ng/ml    The normal value for all drugs tested is negative. This report includes unconfirmed screening results, with the cutoff values listed, to be used for medical treatment purposes only.  Unconfirmed results must not be used for non-medical purposes such as employment or legal testing.  Clinical consideration should be applied to any drug of abuse test, particularly when unconfirmed results are used.      Urinalysis, Microscopic Only -  Urine, Clean Catch [[id number]]  (Abnormal) Collected: 09/13/23 1515    Specimen: Urine, Clean Catch Updated: 09/13/23 1534     RBC, UA 0-2 /HPF      WBC, UA 6-12 /HPF      Bacteria, UA 1+ /HPF      Squamous Epithelial Cells, UA 3-5 /HPF      Hyaline Casts, UA 3-6 /LPF      Methodology Automated Microscopy    Urinalysis With Microscopic If Indicated (No Culture) - Urine, Clean Catch [[id number]]  (Abnormal) Collected: 09/13/23 1515    Specimen: Urine, Clean Catch Updated: 09/13/23 1534     Color, UA Yellow     Appearance, UA Cloudy     pH, UA 5.5     Specific Gravity, UA 1.020     Glucose, UA Negative     Ketones, UA Negative     Bilirubin, UA Negative     Blood, UA Negative     Protein, UA Negative     Leuk Esterase, UA Small (1+)     Nitrite, UA Negative     Urobilinogen, UA 0.2 E.U./dL    Gold Top - SST [[id number]] Collected: 09/13/23 1336    Specimen: Blood Updated: 09/13/23 1445     Extra Tube Hold for add-ons.     Comment: Auto resulted.       Light Blue Top [[id number]] Collected: 09/13/23 1336    Specimen: Blood Updated: 09/13/23 1445     Extra Tube Hold for add-ons.     Comment: Auto resulted       Lipase [[id number]]  (Normal) Collected: 09/13/23 1328    Specimen: Blood Updated: 09/13/23 1412     Lipase 27 U/L     Magnesium [[id number]]  (Normal) Collected: 09/13/23 1328    Specimen: Blood Updated: 09/13/23 1412     Magnesium 1.9 mg/dL     Comprehensive Metabolic Panel [[id number]]  (Abnormal) Collected: 09/13/23 1328    Specimen: Blood Updated: 09/13/23 1404     Glucose 199 mg/dL      BUN 15 mg/dL      Creatinine 1.09 mg/dL      Sodium 140 mmol/L      Potassium 4.3 mmol/L      Comment: Slight hemolysis detected by analyzer. Results may be affected.        Chloride 102 mmol/L      CO2 27.0 mmol/L      Calcium 9.8 mg/dL      Total Protein 8.1 g/dL      Albumin 4.2 g/dL      ALT (SGPT) 54 U/L      AST (SGOT) 45 U/L      Alkaline Phosphatase 140 U/L      Total Bilirubin 0.7 mg/dL      Globulin 3.9 gm/dL      A/G  Ratio 1.1 g/dL      BUN/Creatinine Ratio 13.8     Anion Gap 11.0 mmol/L      eGFR 58.6 mL/min/1.73     Narrative:      GFR Normal >60  Chronic Kidney Disease <60  Kidney Failure <15      CBC & Differential [[id number]]  (Abnormal) Collected: 09/13/23 1328    Specimen: Blood Updated: 09/13/23 1339    Narrative:      The following orders were created for panel order CBC & Differential.  Procedure                               Abnormality         Status                     ---------                               -----------         ------                     CBC Auto Differential[[id number]]        Abnormal            Final result                 Please view results for these tests on the individual orders.    CBC Auto Differential [[id number]]  (Abnormal) Collected: 09/13/23 1328    Specimen: Blood Updated: 09/13/23 1339     WBC 6.33 10*3/mm3      RBC 5.96 10*6/mm3      Hemoglobin 17.5 g/dL      Hematocrit 52.0 %      MCV 87.2 fL      MCH 29.4 pg      MCHC 33.7 g/dL      RDW 12.4 %      RDW-SD 39.8 fl      MPV 9.9 fL      Platelets 231 10*3/mm3      Neutrophil % 64.1 %      Lymphocyte % 26.2 %      Monocyte % 4.3 %      Eosinophil % 3.5 %      Basophil % 1.4 %      Immature Grans % 0.5 %      Neutrophils, Absolute 4.06 10*3/mm3      Lymphocytes, Absolute 1.66 10*3/mm3      Monocytes, Absolute 0.27 10*3/mm3      Eosinophils, Absolute 0.22 10*3/mm3      Basophils, Absolute 0.09 10*3/mm3      Immature Grans, Absolute 0.03 10*3/mm3      nRBC 0.0 /100 WBC            CT Abdomen Pelvis Stone Protocol    Result Date: 9/13/2023  No acute abdominopelvic findings. No clear renal calculi. No hydronephrosis.                                    Medical Decision Making  Patient with persistent hypertension with polysubstance use including methamphetamines.  Blood pressure improved with her home agents and a as needed medication.  Other labs and scans unremarkable.  Patient encouraged to follow-up with her primary care.  Patient was able  to ambulate out the door with a steady gait. No identifiable cause noted for hospitalization or transfer noted during today's visit.       Problems Addressed:  Flank pain: complicated acute illness or injury  Methamphetamine abuse: complicated acute illness or injury    Amount and/or Complexity of Data Reviewed  Labs: ordered.  Radiology: ordered.  ECG/medicine tests: ordered and independent interpretation performed.    Risk  Prescription drug management.        Final diagnoses:   Flank pain   Methamphetamine abuse       ED Disposition  ED Disposition       ED Disposition   Discharge    Condition   Stable    Comment   --               Ninfa Bhatti, APRN  [address] [address]  [phone]    Call in 1 day  As needed         Medication List      No changes were made to your prescriptions during this visit.       This document has been electronically signed by Koby Chao MD on September 13, 2023 23:16 CDT  .sig  [employer]  Part of this note may be an electronic transcription/translation of spoken language to printed text using the Dragon Dictation System.          Koby Chao MD  09/13/23 [id number]

## 2023-09-13 NOTE — DISCHARGE INSTRUCTIONS
Stay away from drugs.  Stay hydrated.  Follow-up with your primary care as needed.  Take all of your medications including blood pressure meds as directed.  Return as needed.

## 2023-09-14 LAB
QT INTERVAL: 396 MS
QTC INTERVAL: 439 MS

## 2023-09-17 ENCOUNTER — APPOINTMENT (OUTPATIENT)
Dept: CT IMAGING | Facility: HOSPITAL | Age: 58
DRG: 948 | End: 2023-09-17
Payer: MEDICARE

## 2023-09-17 ENCOUNTER — HOSPITAL ENCOUNTER (INPATIENT)
Facility: HOSPITAL | Age: 58
LOS: 6 days | Discharge: HOME HEALTH | DRG: 948 | End: 2023-09-30
Attending: FAMILY MEDICINE | Admitting: INTERNAL MEDICINE
Payer: MEDICARE

## 2023-09-17 DIAGNOSIS — Z74.09 IMPAIRED FUNCTIONAL MOBILITY, BALANCE, GAIT, AND ENDURANCE: ICD-10-CM

## 2023-09-17 DIAGNOSIS — M54.2 NECK PAIN: ICD-10-CM

## 2023-09-17 DIAGNOSIS — R29.898 WEAKNESS OF LEFT LOWER EXTREMITY: Primary | ICD-10-CM

## 2023-09-17 DIAGNOSIS — Z78.9 IMPAIRED MOBILITY AND ADLS: ICD-10-CM

## 2023-09-17 DIAGNOSIS — Z74.09 IMPAIRED MOBILITY AND ADLS: ICD-10-CM

## 2023-09-17 LAB
ALBUMIN SERPL-MCNC: 4 G/DL (ref 3.5–5.2)
ALBUMIN/GLOB SERPL: 1.3 G/DL
ALP SERPL-CCNC: 129 U/L (ref 39–117)
ALT SERPL W P-5'-P-CCNC: 41 U/L (ref 1–33)
AMPHET+METHAMPHET UR QL: NEGATIVE
AMPHETAMINES UR QL: NEGATIVE
ANION GAP SERPL CALCULATED.3IONS-SCNC: 10 MMOL/L (ref 5–15)
APAP SERPL-MCNC: <5 MCG/ML (ref 0–30)
AST SERPL-CCNC: 37 U/L (ref 1–32)
BACTERIA UR QL AUTO: ABNORMAL /HPF
BARBITURATES UR QL SCN: NEGATIVE
BASOPHILS # BLD AUTO: 0.09 10*3/MM3 (ref 0–0.2)
BASOPHILS NFR BLD AUTO: 1.1 % (ref 0–1.5)
BENZODIAZ UR QL SCN: NEGATIVE
BILIRUB SERPL-MCNC: 0.5 MG/DL (ref 0–1.2)
BILIRUB UR QL STRIP: NEGATIVE
BUN SERPL-MCNC: 22 MG/DL (ref 6–20)
BUN/CREAT SERPL: 21 (ref 7–25)
BUPRENORPHINE SERPL-MCNC: NEGATIVE NG/ML
CALCIUM SPEC-SCNC: 9 MG/DL (ref 8.6–10.5)
CANNABINOIDS SERPL QL: POSITIVE
CHLORIDE SERPL-SCNC: 103 MMOL/L (ref 98–107)
CK SERPL-CCNC: 56 U/L (ref 20–180)
CLARITY UR: CLEAR
CO2 SERPL-SCNC: 26 MMOL/L (ref 22–29)
COCAINE UR QL: NEGATIVE
COLOR UR: YELLOW
CREAT SERPL-MCNC: 1.05 MG/DL (ref 0.57–1)
DEPRECATED RDW RBC AUTO: 39.1 FL (ref 37–54)
EGFRCR SERPLBLD CKD-EPI 2021: 61.3 ML/MIN/1.73
EOSINOPHIL # BLD AUTO: 0.24 10*3/MM3 (ref 0–0.4)
EOSINOPHIL NFR BLD AUTO: 2.9 % (ref 0.3–6.2)
ERYTHROCYTE [DISTWIDTH] IN BLOOD BY AUTOMATED COUNT: 12.3 % (ref 12.3–15.4)
ETHANOL BLD-MCNC: <10 MG/DL (ref 0–10)
ETHANOL UR QL: <0.01 %
FENTANYL UR-MCNC: NEGATIVE NG/ML
GLOBULIN UR ELPH-MCNC: 3 GM/DL
GLUCOSE SERPL-MCNC: 108 MG/DL (ref 65–99)
GLUCOSE UR STRIP-MCNC: NEGATIVE MG/DL
HCT VFR BLD AUTO: 47.4 % (ref 34–46.6)
HGB BLD-MCNC: 16.4 G/DL (ref 12–15.9)
HGB UR QL STRIP.AUTO: NEGATIVE
HOLD SPECIMEN: NORMAL
HOLD SPECIMEN: NORMAL
HYALINE CASTS UR QL AUTO: ABNORMAL /LPF
IMM GRANULOCYTES # BLD AUTO: 0.02 10*3/MM3 (ref 0–0.05)
IMM GRANULOCYTES NFR BLD AUTO: 0.2 % (ref 0–0.5)
KETONES UR QL STRIP: NEGATIVE
LEUKOCYTE ESTERASE UR QL STRIP.AUTO: ABNORMAL
LYMPHOCYTES # BLD AUTO: 3.1 10*3/MM3 (ref 0.7–3.1)
LYMPHOCYTES NFR BLD AUTO: 37.9 % (ref 19.6–45.3)
MAGNESIUM SERPL-MCNC: 2 MG/DL (ref 1.6–2.6)
MCH RBC QN AUTO: 30.3 PG (ref 26.6–33)
MCHC RBC AUTO-ENTMCNC: 34.6 G/DL (ref 31.5–35.7)
MCV RBC AUTO: 87.5 FL (ref 79–97)
METHADONE UR QL SCN: NEGATIVE
MONOCYTES # BLD AUTO: 0.71 10*3/MM3 (ref 0.1–0.9)
MONOCYTES NFR BLD AUTO: 8.7 % (ref 5–12)
NEUTROPHILS NFR BLD AUTO: 4.01 10*3/MM3 (ref 1.7–7)
NEUTROPHILS NFR BLD AUTO: 49.2 % (ref 42.7–76)
NITRITE UR QL STRIP: NEGATIVE
NRBC BLD AUTO-RTO: 0 /100 WBC (ref 0–0.2)
OPIATES UR QL: NEGATIVE
OXYCODONE UR QL SCN: NEGATIVE
PCP UR QL SCN: NEGATIVE
PH UR STRIP.AUTO: 5.5 [PH] (ref 5–9)
PLATELET # BLD AUTO: 236 10*3/MM3 (ref 140–450)
PMV BLD AUTO: 9.8 FL (ref 6–12)
POTASSIUM SERPL-SCNC: 4.2 MMOL/L (ref 3.5–5.2)
PROPOXYPH UR QL: NEGATIVE
PROT SERPL-MCNC: 7 G/DL (ref 6–8.5)
PROT UR QL STRIP: NEGATIVE
RBC # BLD AUTO: 5.42 10*6/MM3 (ref 3.77–5.28)
RBC # UR STRIP: ABNORMAL /HPF
REF LAB TEST METHOD: ABNORMAL
SALICYLATES SERPL-MCNC: <0.3 MG/DL
SODIUM SERPL-SCNC: 139 MMOL/L (ref 136–145)
SP GR UR STRIP: 1.02 (ref 1–1.03)
SQUAMOUS #/AREA URNS HPF: ABNORMAL /HPF
TRICYCLICS UR QL SCN: NEGATIVE
UROBILINOGEN UR QL STRIP: ABNORMAL
WBC # UR STRIP: ABNORMAL /HPF
WBC NRBC COR # BLD: 8.17 10*3/MM3 (ref 3.4–10.8)
WHOLE BLOOD HOLD COAG: NORMAL

## 2023-09-17 PROCEDURE — G0378 HOSPITAL OBSERVATION PER HR: HCPCS

## 2023-09-17 PROCEDURE — 80307 DRUG TEST PRSMV CHEM ANLYZR: CPT | Performed by: FAMILY MEDICINE

## 2023-09-17 PROCEDURE — 25010000002 ORPHENADRINE CITRATE PER 60 MG: Performed by: FAMILY MEDICINE

## 2023-09-17 PROCEDURE — 80143 DRUG ASSAY ACETAMINOPHEN: CPT | Performed by: FAMILY MEDICINE

## 2023-09-17 PROCEDURE — 85025 COMPLETE CBC W/AUTO DIFF WBC: CPT | Performed by: FAMILY MEDICINE

## 2023-09-17 PROCEDURE — 83735 ASSAY OF MAGNESIUM: CPT | Performed by: FAMILY MEDICINE

## 2023-09-17 PROCEDURE — 99285 EMERGENCY DEPT VISIT HI MDM: CPT

## 2023-09-17 PROCEDURE — 25810000003 SODIUM CHLORIDE 0.9 % SOLUTION: Performed by: FAMILY MEDICINE

## 2023-09-17 PROCEDURE — 25010000002 HYDRALAZINE PER 20 MG: Performed by: FAMILY MEDICINE

## 2023-09-17 PROCEDURE — 80179 DRUG ASSAY SALICYLATE: CPT | Performed by: FAMILY MEDICINE

## 2023-09-17 PROCEDURE — 93005 ELECTROCARDIOGRAM TRACING: CPT | Performed by: FAMILY MEDICINE

## 2023-09-17 PROCEDURE — 93010 ELECTROCARDIOGRAM REPORT: CPT | Performed by: INTERNAL MEDICINE

## 2023-09-17 PROCEDURE — 80053 COMPREHEN METABOLIC PANEL: CPT | Performed by: FAMILY MEDICINE

## 2023-09-17 PROCEDURE — 74176 CT ABD & PELVIS W/O CONTRAST: CPT

## 2023-09-17 PROCEDURE — 25010000002 KETOROLAC TROMETHAMINE PER 15 MG: Performed by: FAMILY MEDICINE

## 2023-09-17 PROCEDURE — 81001 URINALYSIS AUTO W/SCOPE: CPT | Performed by: FAMILY MEDICINE

## 2023-09-17 PROCEDURE — 70450 CT HEAD/BRAIN W/O DYE: CPT

## 2023-09-17 PROCEDURE — 82550 ASSAY OF CK (CPK): CPT | Performed by: FAMILY MEDICINE

## 2023-09-17 PROCEDURE — 82077 ASSAY SPEC XCP UR&BREATH IA: CPT | Performed by: FAMILY MEDICINE

## 2023-09-17 RX ORDER — ORPHENADRINE CITRATE 30 MG/ML
60 INJECTION INTRAMUSCULAR; INTRAVENOUS ONCE
Status: COMPLETED | OUTPATIENT
Start: 2023-09-17 | End: 2023-09-17

## 2023-09-17 RX ORDER — KETOROLAC TROMETHAMINE 30 MG/ML
15 INJECTION, SOLUTION INTRAMUSCULAR; INTRAVENOUS ONCE
Status: COMPLETED | OUTPATIENT
Start: 2023-09-17 | End: 2023-09-17

## 2023-09-17 RX ORDER — HYDRALAZINE HYDROCHLORIDE 20 MG/ML
20 INJECTION INTRAMUSCULAR; INTRAVENOUS ONCE
Status: COMPLETED | OUTPATIENT
Start: 2023-09-17 | End: 2023-09-17

## 2023-09-17 RX ORDER — KETOROLAC TROMETHAMINE 15 MG/ML
15 INJECTION, SOLUTION INTRAMUSCULAR; INTRAVENOUS EVERY 6 HOURS PRN
Status: DISCONTINUED | OUTPATIENT
Start: 2023-09-17 | End: 2023-09-17

## 2023-09-17 RX ADMIN — KETOROLAC TROMETHAMINE 15 MG: 30 INJECTION, SOLUTION INTRAMUSCULAR; INTRAVENOUS at 18:42

## 2023-09-17 RX ADMIN — HYDRALAZINE HYDROCHLORIDE 20 MG: 20 INJECTION INTRAMUSCULAR; INTRAVENOUS at 18:41

## 2023-09-17 RX ADMIN — ORPHENADRINE CITRATE 60 MG: 60 INJECTION INTRAMUSCULAR; INTRAVENOUS at 19:57

## 2023-09-17 RX ADMIN — SODIUM CHLORIDE 1000 ML: 9 INJECTION, SOLUTION INTRAVENOUS at 18:43

## 2023-09-17 NOTE — ED PROVIDER NOTES
"Subjective   History of Present Illness  Patient presents to the emergency department with back pain that began 4 days ago.  She states that she had some itching on her back and she scratched it with a fork 4 days ago and fluid ran \"between my skin and veins\" and down her leg.  Upon examination of her back she has no signs of any recent injury.  She admits to going to [place] recently and purchasing \"legal marijuana\" from a dispensary, however, her sister recently moved and she no longer has transportation and bought some marijuana locally.  She believes that this marijuana may have had some methamphetamines and its because her drug screen was +3 days ago for methamphetamines.  In the emergency department patient complains of generalized fatigue and weakness and states that she is having a difficult time walking secondary to her generalized fatigue.  She also complains of decreased appetite and difficulty sleeping at home.      Weakness - Generalized  Associated symptoms: myalgias    Associated symptoms: no abdominal pain, no chest pain, no cough, no diarrhea, no dizziness, no dysuria, no fever, no frequency, no headaches, no nausea, no seizures, no shortness of breath, no urgency and no vomiting      Review of Systems   Constitutional:  Positive for activity change and fatigue. Negative for appetite change, chills, diaphoresis and fever.   HENT:  Negative for congestion, ear discharge, ear pain, nosebleeds, rhinorrhea, sinus pressure, sore throat and trouble swallowing.    Eyes:  Negative for discharge and redness.   Respiratory:  Negative for apnea, cough, chest tightness, shortness of breath and wheezing.    Cardiovascular:  Negative for chest pain.   Gastrointestinal:  Negative for abdominal pain, diarrhea, nausea and vomiting.   Endocrine: Negative for polyuria.   Genitourinary:  Negative for dysuria, frequency and urgency.   Musculoskeletal:  Positive for back pain and myalgias. Negative for neck pain. "   Skin:  Negative for color change and rash.   Allergic/Immunologic: Negative for immunocompromised state.   Neurological:  Negative for dizziness, seizures, syncope, weakness, light-headedness and headaches.   Hematological:  Negative for adenopathy. Does not bruise/bleed easily.   Psychiatric/Behavioral:  Negative for behavioral problems and confusion. The patient is nervous/anxious.    All other systems reviewed and are negative.    Past Medical History:   Diagnosis Date    Allergic     Arthritis     COPD (chronic obstructive pulmonary disease)     GERD (gastroesophageal reflux disease)     Hypertension     Infectious viral hepatitis     Low back pain        No Known Allergies    Past Surgical History:   Procedure Laterality Date    CHOLECYSTECTOMY      COLONOSCOPY N/A 1/21/2020    Procedure: COLONOSCOPY;  Surgeon: Joshua Perry MD;  Location: [facility] ENDOSCOPY;  Service: General    HYSTERECTOMY         Family History   Problem Relation Age of Onset    Hypertension Mother     Heart disease Mother     Diabetes Father     Cancer Father        Social History     Socioeconomic History    Marital status: Single   Tobacco Use    Smoking status: Every Day     Packs/day: 1.00     Types: Cigarettes    Smokeless tobacco: Never   Substance and Sexual Activity    Alcohol use: Not Currently    Drug use: Yes     Types: Marijuana           Objective   Physical Exam  Vitals and nursing note reviewed.   Constitutional:       Appearance: She is well-developed.   HENT:      Head: Normocephalic and atraumatic.      Nose: Nose normal.   Eyes:      General: No scleral icterus.        Right eye: No discharge.         Left eye: No discharge.      Conjunctiva/sclera: Conjunctivae normal.      Pupils: Pupils are equal, round, and reactive to light.   Neck:      Trachea: No tracheal deviation.   Cardiovascular:      Rate and Rhythm: Normal rate and regular rhythm.      Heart sounds: Normal heart sounds. No murmur  heard.  Pulmonary:      Effort: Pulmonary effort is normal. No respiratory distress.      Breath sounds: Normal breath sounds. No stridor. No wheezing or rales.   Abdominal:      General: Bowel sounds are normal. There is no distension.      Palpations: Abdomen is soft. There is no mass.      Tenderness: There is no abdominal tenderness. There is no guarding or rebound.   Musculoskeletal:      Cervical back: Normal range of motion and neck supple.   Skin:     General: Skin is warm and dry.      Findings: No erythema or rash.   Neurological:      Mental Status: She is alert and oriented to person, place, and time.      Coordination: Coordination normal.   Psychiatric:         Behavior: Behavior normal.         Thought Content: Thought content normal.       ECG 12 Lead      Date/Time: 9/17/2023 10:32 PM  Performed by: Amor Mckeon MD  Authorized by: Amor Mckeon MD   Rhythm: sinus rhythm  BPM: 81  ST Segments: ST segments normal             ED Course  ED Course as of 09/17/23 2232   Sun Sep 17, 2023   2156 Patient states that she began to feel suicidal at the thought of going home.  She is also persistent and that she can move her left lower extremity but she cannot bear weight on that extremity.  Findings were discussed with on-call neurologist, Dr. Monteiro, who recommends admission and neuro consult in the a.m. with MRI to be ordered between now and tomorrow morning. [CB]      ED Course User Index  [CB] Amor Mckeon MD           Labs Reviewed   COMPREHENSIVE METABOLIC PANEL - Abnormal; Notable for the following components:       Result Value    Glucose 108 (*)     BUN 22 (*)     Creatinine 1.05 (*)     ALT (SGPT) 41 (*)     AST (SGOT) 37 (*)     Alkaline Phosphatase 129 (*)     All other components within normal limits    Narrative:     GFR Normal >60  Chronic Kidney Disease <60  Kidney Failure <15     URINALYSIS W/ CULTURE IF INDICATED - Abnormal; Notable for the following components:    Leuk  Esterase, UA Trace (*)     All other components within normal limits    Narrative:     In absence of clinical symptoms, the presence of pyuria, bacteria, and/or nitrites on the urinalysis result does not correlate with infection.   URINE DRUG SCREEN - Abnormal; Notable for the following components:    THC, Screen, Urine Positive (*)     All other components within normal limits    Narrative:     Cutoff For Drugs Screened:    Amphetamines               500 ng/ml  Barbiturates               200 ng/ml  Benzodiazepines            150 ng/ml  Cocaine                    150 ng/ml  Methadone                  200 ng/ml  Opiates                    100 ng/ml  Phencyclidine               25 ng/ml  THC                            50 ng/ml  Methamphetamine            500 ng/ml  Tricyclic Antidepressants  300 ng/ml  Oxycodone                  100 ng/ml  Propoxyphene               300 ng/ml  Buprenorphine               10 ng/ml    The normal value for all drugs tested is negative. This report includes unconfirmed screening results, with the cutoff values listed, to be used for medical treatment purposes only.  Unconfirmed results must not be used for non-medical purposes such as employment or legal testing.  Clinical consideration should be applied to any drug of abuse test, particularly when unconfirmed results are used.     CBC WITH AUTO DIFFERENTIAL - Abnormal; Notable for the following components:    RBC 5.42 (*)     Hemoglobin 16.4 (*)     Hematocrit 47.4 (*)     All other components within normal limits   URINALYSIS, MICROSCOPIC ONLY - Abnormal; Notable for the following components:    RBC, UA 0-2 (*)     All other components within normal limits   CK - Normal   MAGNESIUM - Normal   FENTANYL, URINE - Normal    Narrative:     Negative Threshold:      Fentanyl 5 ng/mL     The normal value for the drug tested is negative. This report includes final unconfirmed screening results to be used for medical treatment purposes only.  Unconfirmed results must not be used for non-medical purposes such as employment or legal testing. Clinical consideration should be applied to any drug of abuse test, particularly when unconfirmed results are used.          ACETAMINOPHEN LEVEL - Normal   SALICYLATE LEVEL - Normal   ETHANOL   CBC AND DIFFERENTIAL    Narrative:     The following orders were created for panel order CBC & Differential.  Procedure                               Abnormality         Status                     ---------                               -----------         ------                     CBC Auto Differential[[id number]]        Abnormal            Final result                 Please view results for these tests on the individual orders.   GOLD TOP - SST   LIGHT BLUE TOP   EXTRA TUBES    Narrative:     The following orders were created for panel order Extra Tubes.  Procedure                               Abnormality         Status                     ---------                               -----------         ------                     Gold Top - SST[[id number]]                                   Final result               Gray Top[[id number]]                                         In process                 Light Blue Top[[id number]]                                   Final result                 Please view results for these tests on the individual orders.   GRAY TOP       CT Abdomen Pelvis Without Contrast   Final Result   1.  No acute inflammatory process throughout the abdomen or pelvis.            CT Head Without Contrast   Final Result   No acute intracranial process.                                            Medical Decision Making  Problems Addressed:  Weakness of left lower extremity: complicated acute illness or injury    Amount and/or Complexity of Data Reviewed  Labs: ordered.  Radiology: ordered.  ECG/medicine tests: ordered.    Risk  Prescription drug management.  Decision regarding hospitalization.        Final diagnoses:    Weakness of left lower extremity       ED Disposition  ED Disposition       ED Disposition   Decision to Admit    Condition   --    Comment   Level of Care: Med/Surg [1]   Diagnosis: Lower extremity weakness [[id number]]   Admitting Physician: MURIEL FLORES [[id number]]   Attending Physician: MURIEL FLORES [[id number]]                 No follow-up provider specified.       Medication List      No changes were made to your prescriptions during this visit.            Amor Mckeon MD  09/17/23 5643       Amor Mckeon MD  09/17/23 9542

## 2023-09-18 ENCOUNTER — APPOINTMENT (OUTPATIENT)
Dept: MRI IMAGING | Facility: HOSPITAL | Age: 58
DRG: 948 | End: 2023-09-18
Payer: MEDICARE

## 2023-09-18 ENCOUNTER — APPOINTMENT (OUTPATIENT)
Dept: ULTRASOUND IMAGING | Facility: HOSPITAL | Age: 58
DRG: 948 | End: 2023-09-18
Payer: MEDICARE

## 2023-09-18 ENCOUNTER — APPOINTMENT (OUTPATIENT)
Dept: GENERAL RADIOLOGY | Facility: HOSPITAL | Age: 58
DRG: 948 | End: 2023-09-18
Payer: MEDICARE

## 2023-09-18 LAB
BASOPHILS # BLD AUTO: 0.07 10*3/MM3 (ref 0–0.2)
BASOPHILS NFR BLD AUTO: 1 % (ref 0–1.5)
DEPRECATED RDW RBC AUTO: 39 FL (ref 37–54)
EOSINOPHIL # BLD AUTO: 0.3 10*3/MM3 (ref 0–0.4)
EOSINOPHIL NFR BLD AUTO: 4.3 % (ref 0.3–6.2)
ERYTHROCYTE [DISTWIDTH] IN BLOOD BY AUTOMATED COUNT: 12.4 % (ref 12.3–15.4)
HCT VFR BLD AUTO: 43 % (ref 34–46.6)
HGB BLD-MCNC: 15 G/DL (ref 12–15.9)
HOLD SPECIMEN: NORMAL
IMM GRANULOCYTES # BLD AUTO: 0.03 10*3/MM3 (ref 0–0.05)
IMM GRANULOCYTES NFR BLD AUTO: 0.4 % (ref 0–0.5)
LYMPHOCYTES # BLD AUTO: 2.58 10*3/MM3 (ref 0.7–3.1)
LYMPHOCYTES NFR BLD AUTO: 37.3 % (ref 19.6–45.3)
MCH RBC QN AUTO: 30.2 PG (ref 26.6–33)
MCHC RBC AUTO-ENTMCNC: 34.9 G/DL (ref 31.5–35.7)
MCV RBC AUTO: 86.7 FL (ref 79–97)
MONOCYTES # BLD AUTO: 0.49 10*3/MM3 (ref 0.1–0.9)
MONOCYTES NFR BLD AUTO: 7.1 % (ref 5–12)
NEUTROPHILS NFR BLD AUTO: 3.44 10*3/MM3 (ref 1.7–7)
NEUTROPHILS NFR BLD AUTO: 49.9 % (ref 42.7–76)
NRBC BLD AUTO-RTO: 0 /100 WBC (ref 0–0.2)
PLATELET # BLD AUTO: 195 10*3/MM3 (ref 140–450)
PMV BLD AUTO: 9.6 FL (ref 6–12)
RBC # BLD AUTO: 4.96 10*6/MM3 (ref 3.77–5.28)
WBC NRBC COR # BLD: 6.91 10*3/MM3 (ref 3.4–10.8)

## 2023-09-18 PROCEDURE — A9270 NON-COVERED ITEM OR SERVICE: HCPCS | Performed by: INTERNAL MEDICINE

## 2023-09-18 PROCEDURE — 63710000001 POLYETHYLENE GLYCOL 17 G PACK: Performed by: INTERNAL MEDICINE

## 2023-09-18 PROCEDURE — 63710000001 DULOXETINE 20 MG CAPSULE DELAYED-RELEASE PARTICLES: Performed by: INTERNAL MEDICINE

## 2023-09-18 PROCEDURE — 93925 LOWER EXTREMITY STUDY: CPT

## 2023-09-18 PROCEDURE — 70551 MRI BRAIN STEM W/O DYE: CPT

## 2023-09-18 PROCEDURE — 63710000001 LISINOPRIL 10 MG TABLET: Performed by: INTERNAL MEDICINE

## 2023-09-18 PROCEDURE — 63710000001 NICOTINE 21 MG/24HR PATCH 24 HOUR: Performed by: INTERNAL MEDICINE

## 2023-09-18 PROCEDURE — 90792 PSYCH DIAG EVAL W/MED SRVCS: CPT | Performed by: PSYCHIATRY & NEUROLOGY

## 2023-09-18 PROCEDURE — 74022 RADEX COMPL AQT ABD SERIES: CPT

## 2023-09-18 PROCEDURE — 25010000002 ONDANSETRON PER 1 MG: Performed by: INTERNAL MEDICINE

## 2023-09-18 PROCEDURE — 25010000002 MORPHINE PER 10 MG: Performed by: INTERNAL MEDICINE

## 2023-09-18 PROCEDURE — 93005 ELECTROCARDIOGRAM TRACING: CPT

## 2023-09-18 PROCEDURE — 63710000001 ACETAMINOPHEN 160 MG/5ML SOLUTION: Performed by: INTERNAL MEDICINE

## 2023-09-18 PROCEDURE — 25810000003 LACTATED RINGERS PER 1000 ML

## 2023-09-18 PROCEDURE — G0378 HOSPITAL OBSERVATION PER HR: HCPCS

## 2023-09-18 PROCEDURE — 25010000002 ENOXAPARIN PER 10 MG: Performed by: INTERNAL MEDICINE

## 2023-09-18 PROCEDURE — 25010000002 METOCLOPRAMIDE PER 10 MG

## 2023-09-18 PROCEDURE — 63710000001 BISACODYL 5 MG TABLET DELAYED-RELEASE: Performed by: INTERNAL MEDICINE

## 2023-09-18 PROCEDURE — 93010 ELECTROCARDIOGRAM REPORT: CPT | Performed by: INTERNAL MEDICINE

## 2023-09-18 PROCEDURE — 63710000001 SENNOSIDES-DOCUSATE 8.6-50 MG TABLET: Performed by: INTERNAL MEDICINE

## 2023-09-18 PROCEDURE — 72148 MRI LUMBAR SPINE W/O DYE: CPT

## 2023-09-18 PROCEDURE — 25810000003 LACTATED RINGERS PER 1000 ML: Performed by: INTERNAL MEDICINE

## 2023-09-18 PROCEDURE — 85025 COMPLETE CBC W/AUTO DIFF WBC: CPT | Performed by: INTERNAL MEDICINE

## 2023-09-18 RX ORDER — CYCLOBENZAPRINE HCL 10 MG
10 TABLET ORAL 3 TIMES DAILY PRN
Status: DISCONTINUED | OUTPATIENT
Start: 2023-09-18 | End: 2023-09-28

## 2023-09-18 RX ORDER — ONDANSETRON 4 MG/1
4 TABLET, FILM COATED ORAL EVERY 6 HOURS PRN
Status: DISCONTINUED | OUTPATIENT
Start: 2023-09-18 | End: 2023-09-30 | Stop reason: HOSPADM

## 2023-09-18 RX ORDER — POLYETHYLENE GLYCOL 3350 17 G/17G
17 POWDER, FOR SOLUTION ORAL DAILY PRN
Status: DISCONTINUED | OUTPATIENT
Start: 2023-09-18 | End: 2023-09-30 | Stop reason: HOSPADM

## 2023-09-18 RX ORDER — NALOXONE HCL 0.4 MG/ML
0.4 VIAL (ML) INJECTION
Status: DISCONTINUED | OUTPATIENT
Start: 2023-09-18 | End: 2023-09-30 | Stop reason: HOSPADM

## 2023-09-18 RX ORDER — SODIUM CHLORIDE 0.9 % (FLUSH) 0.9 %
10 SYRINGE (ML) INJECTION AS NEEDED
Status: DISCONTINUED | OUTPATIENT
Start: 2023-09-18 | End: 2023-09-30 | Stop reason: HOSPADM

## 2023-09-18 RX ORDER — NICOTINE 21 MG/24HR
1 PATCH, TRANSDERMAL 24 HOURS TRANSDERMAL EVERY 24 HOURS
Status: DISCONTINUED | OUTPATIENT
Start: 2023-09-18 | End: 2023-09-30 | Stop reason: HOSPADM

## 2023-09-18 RX ORDER — ONDANSETRON 2 MG/ML
4 INJECTION INTRAMUSCULAR; INTRAVENOUS EVERY 6 HOURS PRN
Status: DISCONTINUED | OUTPATIENT
Start: 2023-09-18 | End: 2023-09-30 | Stop reason: HOSPADM

## 2023-09-18 RX ORDER — CHOLECALCIFEROL (VITAMIN D3) 125 MCG
5 CAPSULE ORAL NIGHTLY PRN
Status: DISCONTINUED | OUTPATIENT
Start: 2023-09-18 | End: 2023-09-30 | Stop reason: HOSPADM

## 2023-09-18 RX ORDER — SODIUM CHLORIDE, SODIUM LACTATE, POTASSIUM CHLORIDE, CALCIUM CHLORIDE 600; 310; 30; 20 MG/100ML; MG/100ML; MG/100ML; MG/100ML
100 INJECTION, SOLUTION INTRAVENOUS CONTINUOUS
Status: DISCONTINUED | OUTPATIENT
Start: 2023-09-18 | End: 2023-09-18 | Stop reason: SDUPTHER

## 2023-09-18 RX ORDER — ACETAMINOPHEN 650 MG/1
650 SUPPOSITORY RECTAL EVERY 4 HOURS PRN
Status: DISCONTINUED | OUTPATIENT
Start: 2023-09-18 | End: 2023-09-30 | Stop reason: HOSPADM

## 2023-09-18 RX ORDER — ACETAMINOPHEN 160 MG/5ML
650 SOLUTION ORAL EVERY 4 HOURS PRN
Status: DISCONTINUED | OUTPATIENT
Start: 2023-09-18 | End: 2023-09-30 | Stop reason: HOSPADM

## 2023-09-18 RX ORDER — SUMATRIPTAN 50 MG/1
50 TABLET, FILM COATED ORAL
Status: DISCONTINUED | OUTPATIENT
Start: 2023-09-18 | End: 2023-09-30 | Stop reason: HOSPADM

## 2023-09-18 RX ORDER — SODIUM CHLORIDE 9 MG/ML
40 INJECTION, SOLUTION INTRAVENOUS AS NEEDED
Status: DISCONTINUED | OUTPATIENT
Start: 2023-09-18 | End: 2023-09-30 | Stop reason: HOSPADM

## 2023-09-18 RX ORDER — LISINOPRIL 10 MG/1
10 TABLET ORAL 2 TIMES DAILY
Status: DISCONTINUED | OUTPATIENT
Start: 2023-09-18 | End: 2023-09-30 | Stop reason: HOSPADM

## 2023-09-18 RX ORDER — MORPHINE SULFATE 2 MG/ML
2 INJECTION, SOLUTION INTRAMUSCULAR; INTRAVENOUS EVERY 4 HOURS PRN
Status: DISPENSED | OUTPATIENT
Start: 2023-09-18 | End: 2023-09-25

## 2023-09-18 RX ORDER — SODIUM CHLORIDE 0.9 % (FLUSH) 0.9 %
10 SYRINGE (ML) INJECTION EVERY 12 HOURS SCHEDULED
Status: DISCONTINUED | OUTPATIENT
Start: 2023-09-18 | End: 2023-09-30 | Stop reason: HOSPADM

## 2023-09-18 RX ORDER — SODIUM CHLORIDE, SODIUM LACTATE, POTASSIUM CHLORIDE, CALCIUM CHLORIDE 600; 310; 30; 20 MG/100ML; MG/100ML; MG/100ML; MG/100ML
100 INJECTION, SOLUTION INTRAVENOUS CONTINUOUS
Status: DISCONTINUED | OUTPATIENT
Start: 2023-09-18 | End: 2023-09-19

## 2023-09-18 RX ORDER — BISACODYL 10 MG
10 SUPPOSITORY, RECTAL RECTAL DAILY PRN
Status: DISCONTINUED | OUTPATIENT
Start: 2023-09-18 | End: 2023-09-30 | Stop reason: HOSPADM

## 2023-09-18 RX ORDER — ENOXAPARIN SODIUM 100 MG/ML
40 INJECTION SUBCUTANEOUS DAILY
Status: DISCONTINUED | OUTPATIENT
Start: 2023-09-18 | End: 2023-09-30 | Stop reason: HOSPADM

## 2023-09-18 RX ORDER — CALCIUM CARBONATE 500 MG/1
1 TABLET, CHEWABLE ORAL 2 TIMES DAILY PRN
Status: DISCONTINUED | OUTPATIENT
Start: 2023-09-18 | End: 2023-09-30 | Stop reason: HOSPADM

## 2023-09-18 RX ORDER — AMOXICILLIN 250 MG
2 CAPSULE ORAL 2 TIMES DAILY
Status: DISCONTINUED | OUTPATIENT
Start: 2023-09-18 | End: 2023-09-30 | Stop reason: HOSPADM

## 2023-09-18 RX ORDER — ACETAMINOPHEN 325 MG/1
650 TABLET ORAL EVERY 4 HOURS PRN
Status: DISCONTINUED | OUTPATIENT
Start: 2023-09-18 | End: 2023-09-30 | Stop reason: HOSPADM

## 2023-09-18 RX ORDER — SODIUM CHLORIDE, SODIUM LACTATE, POTASSIUM CHLORIDE, CALCIUM CHLORIDE 600; 310; 30; 20 MG/100ML; MG/100ML; MG/100ML; MG/100ML
100 INJECTION, SOLUTION INTRAVENOUS CONTINUOUS
Status: DISCONTINUED | OUTPATIENT
Start: 2023-09-18 | End: 2023-09-18

## 2023-09-18 RX ORDER — LACTULOSE 10 G/15ML
300 SOLUTION ORAL ONCE
Status: DISCONTINUED | OUTPATIENT
Start: 2023-09-18 | End: 2023-09-19 | Stop reason: SDUPTHER

## 2023-09-18 RX ORDER — METOCLOPRAMIDE HYDROCHLORIDE 5 MG/ML
10 INJECTION INTRAMUSCULAR; INTRAVENOUS EVERY 6 HOURS
Status: DISCONTINUED | OUTPATIENT
Start: 2023-09-18 | End: 2023-09-26

## 2023-09-18 RX ORDER — NITROGLYCERIN 0.4 MG/1
0.4 TABLET SUBLINGUAL
Status: DISCONTINUED | OUTPATIENT
Start: 2023-09-18 | End: 2023-09-30 | Stop reason: HOSPADM

## 2023-09-18 RX ORDER — BISACODYL 5 MG/1
5 TABLET, DELAYED RELEASE ORAL DAILY PRN
Status: DISCONTINUED | OUTPATIENT
Start: 2023-09-18 | End: 2023-09-30 | Stop reason: HOSPADM

## 2023-09-18 RX ORDER — DULOXETIN HYDROCHLORIDE 20 MG/1
20 CAPSULE, DELAYED RELEASE ORAL DAILY
Status: DISCONTINUED | OUTPATIENT
Start: 2023-09-18 | End: 2023-09-30 | Stop reason: HOSPADM

## 2023-09-18 RX ADMIN — DOCUSATE SODIUM 50 MG AND SENNOSIDES 8.6 MG 2 TABLET: 8.6; 5 TABLET, FILM COATED ORAL at 09:04

## 2023-09-18 RX ADMIN — DULOXETINE HYDROCHLORIDE 20 MG: 20 CAPSULE, DELAYED RELEASE ORAL at 09:04

## 2023-09-18 RX ADMIN — METOCLOPRAMIDE HYDROCHLORIDE 10 MG: 5 INJECTION INTRAMUSCULAR; INTRAVENOUS at 16:06

## 2023-09-18 RX ADMIN — MORPHINE SULFATE 4 MG: 4 INJECTION, SOLUTION INTRAMUSCULAR; INTRAVENOUS at 03:15

## 2023-09-18 RX ADMIN — SODIUM CHLORIDE, POTASSIUM CHLORIDE, SODIUM LACTATE AND CALCIUM CHLORIDE 100 ML/HR: 600; 310; 30; 20 INJECTION, SOLUTION INTRAVENOUS at 16:06

## 2023-09-18 RX ADMIN — SODIUM CHLORIDE, POTASSIUM CHLORIDE, SODIUM LACTATE AND CALCIUM CHLORIDE 100 ML/HR: 600; 310; 30; 20 INJECTION, SOLUTION INTRAVENOUS at 04:11

## 2023-09-18 RX ADMIN — ONDANSETRON 4 MG: 2 INJECTION INTRAMUSCULAR; INTRAVENOUS at 12:19

## 2023-09-18 RX ADMIN — ENOXAPARIN SODIUM 40 MG: 40 INJECTION SUBCUTANEOUS at 03:13

## 2023-09-18 RX ADMIN — BISACODYL 5 MG: 5 TABLET, COATED ORAL at 21:08

## 2023-09-18 RX ADMIN — LISINOPRIL 10 MG: 10 TABLET ORAL at 09:04

## 2023-09-18 RX ADMIN — ACETAMINOPHEN 650 MG: 650 SOLUTION ORAL at 16:06

## 2023-09-18 RX ADMIN — SODIUM CHLORIDE, POTASSIUM CHLORIDE, SODIUM LACTATE AND CALCIUM CHLORIDE 100 ML/HR: 600; 310; 30; 20 INJECTION, SOLUTION INTRAVENOUS at 03:09

## 2023-09-18 RX ADMIN — LISINOPRIL 10 MG: 10 TABLET ORAL at 21:08

## 2023-09-18 RX ADMIN — METOCLOPRAMIDE HYDROCHLORIDE 10 MG: 5 INJECTION INTRAMUSCULAR; INTRAVENOUS at 21:08

## 2023-09-18 RX ADMIN — MORPHINE SULFATE 2 MG: 2 INJECTION, SOLUTION INTRAMUSCULAR; INTRAVENOUS at 21:15

## 2023-09-18 RX ADMIN — POLYETHYLENE GLYCOL 3350 17 G: 17 POWDER, FOR SOLUTION ORAL at 12:19

## 2023-09-18 RX ADMIN — MORPHINE SULFATE 2 MG: 2 INJECTION, SOLUTION INTRAMUSCULAR; INTRAVENOUS at 09:06

## 2023-09-18 RX ADMIN — Medication 10 ML: at 09:04

## 2023-09-18 RX ADMIN — DOCUSATE SODIUM 50 MG AND SENNOSIDES 8.6 MG 2 TABLET: 8.6; 5 TABLET, FILM COATED ORAL at 21:08

## 2023-09-18 RX ADMIN — NICOTINE 1 PATCH: 21 PATCH, EXTENDED RELEASE TRANSDERMAL at 01:16

## 2023-09-18 NOTE — H&P
"    [facility] Medicine  HISTORY AND PHYSICAL      Date of Admission: 9/17/2023  Primary Care Physician: Ninfa Bhatti APRN    Subjective     Chief Complaint: LLE weakness    History of Present Illness  Patient with past medical history of COPD, GERD, hypertension, arthritis presents with left lower extremity weakness.  Patient states that lower extremity weakness began 3 days ago.  Patient admits to history of substance abuse, and was reportedly positive for methamphetamines 3 days ago.  Patient seen in emergency room for left lower extremity weakness, and discharged home.  Patient states that left lower extremity weakness has not improved since original ED evaluation.  States pain is 10/10, focused on lower back, with cold chill like radiation to left gluteal region and then down left leg.  Patient also admits to feeling tingling sensation in left lower extremity due to leg pain.  States that leg pain is affecting ambulation.  Denies previous episodes of leg discomfort.  States that leg discomfort started after scratching her back with a \"fork\" 3 days ago.  Denies bowel/bladder dysfunction.  Patient has adequate rectal tone.  Denies that lower extremity weakness is affecting ambulation.  Also states she has not had a bowel movement since Tuesday.  CT abdomen/pelvis shows no constipation, or acute abdominal signs.    Patient has also admitted to suicidal ideation both to emergency room physician Dr. Mckeon and to admitting hospitalist Dr. Linares.  Patient denies plans for suicide, but does have suicidal thoughts acutely.        Review of Systems   Otherwise complete ROS reviewed and negative except as mentioned in the HPI.    Past Medical History:   Past Medical History:   Diagnosis Date   • Allergic    • Arthritis    • COPD (chronic obstructive pulmonary disease)    • GERD (gastroesophageal reflux disease)    • Hypertension    • Infectious viral hepatitis    • Low " "back pain      Past Surgical History:  Past Surgical History:   Procedure Laterality Date   • CHOLECYSTECTOMY     • COLONOSCOPY N/A 1/21/2020    Procedure: COLONOSCOPY;  Surgeon: Joshua Perry MD;  Location: [facility] ENDOSCOPY;  Service: General   • HYSTERECTOMY       Social History:  reports that she has been smoking cigarettes. She has been smoking an average of 1 pack per day. She has never used smokeless tobacco. She reports that she does not currently use alcohol. She reports current drug use. Drug: Marijuana.    Family History: family history includes Cancer in her father; Diabetes in her father; Heart disease in her mother; Hypertension in her mother.        Allergies:  No Known Allergies    Medications:  Prior to Admission medications    Medication Sig Start Date End Date Taking? Authorizing Provider   DULoxetine (CYMBALTA) 20 MG capsule Take 1 capsule by mouth Daily.   Yes Emergency, Nurse Aundrea, RN   fluconazole (DIFLUCAN) 200 MG tablet Take one at the first sign of infection and may repeat in 3 days as needed. 4/7/20  Yes Luisa Hayes APRN   lisinopril (PRINIVIL,ZESTRIL) 10 MG tablet Take 1 tablet by mouth 2 (Two) Times a Day.   Yes Provider, MD Lianet     I have utilized all available immediate resources to obtain, update, and review the patient's current medications.    Objective     Vital Signs: /88 (BP Location: Left arm, Patient Position: Sitting)   Pulse 99   Temp 98 °F (36.7 °C) (Temporal)   Resp 16   Ht 154.9 cm (61\")   Wt 72.6 kg (160 lb)   SpO2 97%   BMI 30.23 kg/m²   Physical Exam  Constitutional:       Appearance: Normal appearance. She is normal weight.   HENT:      Head: Normocephalic and atraumatic.      Right Ear: Ear canal normal.      Left Ear: Ear canal normal.      Nose: Nose normal.      Mouth/Throat:      Mouth: Mucous membranes are moist.      Pharynx: Oropharynx is clear.   Eyes:      Extraocular Movements: Extraocular movements intact.      " Conjunctiva/sclera: Conjunctivae normal.      Pupils: Pupils are equal, round, and reactive to light.   Cardiovascular:      Rate and Rhythm: Normal rate and regular rhythm.      Pulses: Normal pulses.      Heart sounds: Normal heart sounds.   Pulmonary:      Effort: Pulmonary effort is normal.      Breath sounds: Normal breath sounds.   Abdominal:      General: Abdomen is flat. Bowel sounds are normal.      Palpations: Abdomen is soft.   Musculoskeletal:         General: Normal range of motion.      Cervical back: Normal range of motion and neck supple.   Skin:     General: Skin is warm.      Capillary Refill: Capillary refill takes less than 2 seconds.   Neurological:      Mental Status: She is alert and oriented to person, place, and time.      Motor: Weakness present.            Results Reviewed:  Lab Results (last 24 hours)       Procedure Component Value Units Date/Time    Extra Tubes [[id number]] Collected: 09/17/23 1843    Specimen: Blood Updated: 09/17/23 2246    Narrative:      The following orders were created for panel order Extra Tubes.  Procedure                               Abnormality         Status                     ---------                               -----------         ------                     Gold Top - [facility][[id number]]                                   Final result               Gray Top[[id number]]                                         Final result               Light Blue Top[[id number]]                                   Final result                 Please view results for these tests on the individual orders.    Gray Top [[id number]] Collected: 09/17/23 1843    Specimen: Blood Updated: 09/17/23 2246     Extra Tube Hold for add-ons.     Comment: Auto resulted.       Acetaminophen Level [[id number]]  (Normal) Collected: 09/17/23 1843    Specimen: Blood Updated: 09/17/23 2117     Acetaminophen <5.0 mcg/mL     Ethanol [[id number]] Collected: 09/17/23 1843    Specimen: Blood Updated: 09/17/23  2117     Ethanol <10 mg/dL      Ethanol % <0.010 %     Salicylate Level [[id number]]  (Normal) Collected: 09/17/23 1843    Specimen: Blood Updated: 09/17/23 2117     Salicylate <0.3 mg/dL     Gold Top - SST [[id number]] Collected: 09/17/23 1843    Specimen: Blood Updated: 09/17/23 1945     Extra Tube Hold for add-ons.     Comment: Auto resulted.       Light Blue Top [[id number]] Collected: 09/17/23 1843    Specimen: Blood Updated: 09/17/23 1945     Extra Tube Hold for add-ons.     Comment: Auto resulted       Fentanyl, Urine - Urine, Clean Catch [[id number]]  (Normal) Collected: 09/17/23 1847    Specimen: Urine, Clean Catch Updated: 09/17/23 1916     Fentanyl, Urine Negative    Narrative:      Negative Threshold:      Fentanyl 5 ng/mL     The normal value for the drug tested is negative. This report includes final unconfirmed screening results to be used for medical treatment purposes only. Unconfirmed results must not be used for non-medical purposes such as employment or legal testing. Clinical consideration should be applied to any drug of abuse test, particularly when unconfirmed results are used.           Magnesium [[id number]]  (Normal) Collected: 09/17/23 1843    Specimen: Blood Updated: 09/17/23 1912     Magnesium 2.0 mg/dL     Comprehensive Metabolic Panel [[id number]]  (Abnormal) Collected: 09/17/23 1843    Specimen: Blood Updated: 09/17/23 1912     Glucose 108 mg/dL      BUN 22 mg/dL      Creatinine 1.05 mg/dL      Sodium 139 mmol/L      Potassium 4.2 mmol/L      Chloride 103 mmol/L      CO2 26.0 mmol/L      Calcium 9.0 mg/dL      Total Protein 7.0 g/dL      Albumin 4.0 g/dL      ALT (SGPT) 41 U/L      AST (SGOT) 37 U/L      Alkaline Phosphatase 129 U/L      Total Bilirubin 0.5 mg/dL      Globulin 3.0 gm/dL      A/G Ratio 1.3 g/dL      BUN/Creatinine Ratio 21.0     Anion Gap 10.0 mmol/L      eGFR 61.3 mL/min/1.73     Narrative:      GFR Normal >60  Chronic Kidney Disease <60  Kidney Failure <15      CK  [[id number]]  (Normal) Collected: 09/17/23 1843    Specimen: Blood Updated: 09/17/23 1912     Creatine Kinase 56 U/L     Urine Drug Screen - Urine, Clean Catch [[id number]]  (Abnormal) Collected: 09/17/23 1847    Specimen: Urine, Clean Catch Updated: 09/17/23 1909     THC, Screen, Urine Positive     Phencyclidine (PCP), Urine Negative     Cocaine Screen, Urine Negative     Methamphetamine, Ur Negative     Opiate Screen Negative     Amphetamine Screen, Urine Negative     Benzodiazepine Screen, Urine Negative     Tricyclic Antidepressants Screen Negative     Methadone Screen, Urine Negative     Barbiturates Screen, Urine Negative     Oxycodone Screen, Urine Negative     Propoxyphene Screen Negative     Buprenorphine, Screen, Urine Negative    Narrative:      Cutoff For Drugs Screened:    Amphetamines               500 ng/ml  Barbiturates               200 ng/ml  Benzodiazepines            150 ng/ml  Cocaine                    150 ng/ml  Methadone                  200 ng/ml  Opiates                    100 ng/ml  Phencyclidine               25 ng/ml  THC                            50 ng/ml  Methamphetamine            500 ng/ml  Tricyclic Antidepressants  300 ng/ml  Oxycodone                  100 ng/ml  Propoxyphene               300 ng/ml  Buprenorphine               10 ng/ml    The normal value for all drugs tested is negative. This report includes unconfirmed screening results, with the cutoff values listed, to be used for medical treatment purposes only.  Unconfirmed results must not be used for non-medical purposes such as employment or legal testing.  Clinical consideration should be applied to any drug of abuse test, particularly when unconfirmed results are used.      Urinalysis, Microscopic Only - Urine, Clean Catch [[id number]]  (Abnormal) Collected: 09/17/23 1847    Specimen: Urine, Clean Catch Updated: 09/17/23 1859     RBC, UA 0-2 /HPF      WBC, UA 0-2 /HPF      Comment: Urine culture not indicated.         Bacteria, UA None Seen /HPF      Squamous Epithelial Cells, UA 0-2 /HPF      Hyaline Casts, UA None Seen /LPF      Methodology Automated Microscopy    Urinalysis With Culture If Indicated - Urine, Clean Catch [[id number]]  (Abnormal) Collected: 09/17/23 1847    Specimen: Urine, Clean Catch Updated: 09/17/23 1858     Color, UA Yellow     Appearance, UA Clear     pH, UA 5.5     Specific Gravity, UA 1.020     Glucose, UA Negative     Ketones, UA Negative     Bilirubin, UA Negative     Blood, UA Negative     Protein, UA Negative     Leuk Esterase, UA Trace     Nitrite, UA Negative     Urobilinogen, UA 1.0 E.U./dL    Narrative:      In absence of clinical symptoms, the presence of pyuria, bacteria, and/or nitrites on the urinalysis result does not correlate with infection.    CBC & Differential [[id number]]  (Abnormal) Collected: 09/17/23 1843    Specimen: Blood Updated: 09/17/23 1855    Narrative:      The following orders were created for panel order CBC & Differential.  Procedure                               Abnormality         Status                     ---------                               -----------         ------                     CBC Auto Differential[[id number]]        Abnormal            Final result                 Please view results for these tests on the individual orders.    CBC Auto Differential [[id number]]  (Abnormal) Collected: 09/17/23 1843    Specimen: Blood Updated: 09/17/23 1855     WBC 8.17 10*3/mm3      RBC 5.42 10*6/mm3      Hemoglobin 16.4 g/dL      Hematocrit 47.4 %      MCV 87.5 fL      MCH 30.3 pg      MCHC 34.6 g/dL      RDW 12.3 %      RDW-SD 39.1 fl      MPV 9.8 fL      Platelets 236 10*3/mm3      Neutrophil % 49.2 %      Lymphocyte % 37.9 %      Monocyte % 8.7 %      Eosinophil % 2.9 %      Basophil % 1.1 %      Immature Grans % 0.2 %      Neutrophils, Absolute 4.01 10*3/mm3      Lymphocytes, Absolute 3.10 10*3/mm3      Monocytes, Absolute 0.71 10*3/mm3      Eosinophils, Absolute 0.24  10*3/mm3      Basophils, Absolute 0.09 10*3/mm3      Immature Grans, Absolute 0.02 10*3/mm3      nRBC 0.0 /100 WBC           Imaging Results (Last 24 Hours)       Procedure Component Value Units Date/Time    CT Abdomen Pelvis Without Contrast [[id number]] Collected: 09/17/23 2057     Updated: 09/17/23 2102    Narrative:      CT ABDOMEN PELVIS WO CONTRAST    HISTORY: Abdominal pain, acute, nonlocalized    COMPARISON: CT abdomen pelvis 9/13/2023    Automated exposure control was also utilized to decrease patient radiation dose.    TECHNIQUE: Images of the abdomen and pelvis were obtained.  No IV contrast was  ministered.  Multiplanar reformatted images were provided for review.      FINDINGS:      Mild subsegmental atelectasis in the middle lobe.    Visualized mediastinal structures are normal.    Osseous structures are age-appropriate.    Liver is normal.  Spleen is unremarkable.  Bilateral kidneys are within normal limits.  No renal calculus or obstructive  uropathy.  Bilateral adrenal glands are normal.  Pancreas is normal.  Cholecystectomy.    No bowel obstruction.    The rectum is normal.  Bladder is normal.    No free air or free fluid.    No lymphadenopathy.          Impression:      1.  No acute inflammatory process throughout the abdomen or pelvis.        CT Head Without Contrast [[id number]] Collected: 09/17/23 1859     Updated: 09/17/23 1902    Narrative:      HEAD CT WITHOUT IV CONTRAST    HISTORY:  Unable to feel legs.    COMPARISON:  None.    TECHNIQUE:  Routine helical imaging through the brain with axial, coronal and sagittal  two-dimensional reformatted images.    FINDINGS:  There is no evidence of acute intracranial hemorrhage, mass effect or midline  shift. No abnormal extra-axial fluid collection. No areas of abnormal  attenuation within the brain parenchyma to suggest an acute infarction. The  ventricles, cisterns, and sulci are within normal limits in size and  configuration. The visualized skull  base structures and paranasal sinuses are  unremarkable. No calvarial fracture or other lesion.      Impression:      No acute intracranial process.          I have personally reviewed and interpreted the radiology studies and ECG obtained at time of admission.     Scheduled Meds:enoxaparin, 40 mg, Subcutaneous, Daily  nicotine, 1 patch, Transdermal, Q24H  senna-docusate sodium, 2 tablet, Oral, BID  sodium chloride, 10 mL, Intravenous, Q12H      Continuous Infusions:lactated ringers, 100 mL/hr      PRN Meds:.•  acetaminophen **OR** acetaminophen **OR** acetaminophen  •  senna-docusate sodium **AND** polyethylene glycol **AND** bisacodyl **AND** bisacodyl  •  calcium carbonate  •  Calcium Replacement - Follow Nurse / BPA Driven Protocol  •  cyclobenzaprine  •  Magnesium Low Dose Replacement - Follow Nurse / BPA Driven Protocol  •  melatonin  •  Morphine **AND** naloxone  •  Morphine **AND** naloxone  •  nitroglycerin  •  ondansetron **OR** ondansetron  •  Phosphorus Replacement - Follow Nurse / BPA Driven Protocol  •  Potassium Replacement - Follow Nurse / BPA Driven Protocol  •  sodium chloride  •  sodium chloride  •  SUMAtriptan   Assessment / Plan     Assessment:   Active Hospital Problems    Diagnosis    • **Lower extremity weakness      Plan:   Left lower extremity weakness x3 days:  - Given severity of symptoms, need to rule out spinal nerve root compression and/or CVA.  We will therefore order MRI brain, and MRI lumbar spine.  PT OT evaluation during hospitalization.  As needed pain meds.  Start Flexeril 3 times daily.    Suicidal ideation:  - Patient admitted to both Dr. Mckeon emergency room physician and admitting hospitalist Dr. Linares that she suffers from suicidal ideation.  We will therefore place patient on one-to-one sitter, and order psych consultation in a.m.    Polysubstance abuse:  - Patient positive for methamphetamines 9/13/2023.  Patient's urine drug screen negative for methamphetamines 9/17.   Recommend patient stop using recreational drugs.  Polysubstance abuse anticipatory guidance given by Dr. Linares at time of admission.    Hypertension: Continue home medications    Medical Decision Making  Number and Complexity of problems: See above   Differential Diagnosis: See above    Conditions and Status:        Condition is improving.    I have utilized all available immediate resources to obtain, update, or review the patient's current medications (including all prescriptions, over-the-counter products, herbals, cannabis/cannabidiol products, and vitamin/mineral/dietary (nutritional) supplements).      MDM Data  External documents reviewed: Up-to-date, care everywhere  My EKG interpretation:    My CT interpretation: No  acute findings  Tests considered but not ordered:       Decision rules/scores evaluated (example [id number], [place], etc):       Discussed with: Patient, nursing staff     Treatment Plan  The above    Care Planning  Shared decision making: Patient, nursing staff, Dr. Linares  Code status and discussions: Full    Disposition  Social Determinants of Health that impact treatment or disposition: None  I expect the patient to be discharged to home or inpatient psychiatry in 2-4 days.     The patient was seen and examined by me on 9/17/2023 at home 2040.    Electronically signed by Collin Linares MD, 09/18/23, 02:35 CDT.

## 2023-09-18 NOTE — CONSULTS
"                                Psychiatry & Behavioral Health Emergency Department Consult    9/18/2023    Referring Provider: Dr. Valero  Reason for Consultation: suicidal ideation    History of Present Illness:    Patient is a 57 y.o. female with past medical history significant for COPD, GERD, hypertension, arthritis, low back pain who presented to the ED with complaints of back pain for the preceding 4 days.  She reported that the pain was so severe and debilitating that she was having thoughts of killing herself if she was sent home with no relief of the pain.  She was admitted to the medical service for workup of the pain and psych was consulted due to the suicidal statements.    Nurse Shelton in the ED documented the following:  Pt states at this time \"when I was sent home from the hospital and couldn't do anything for myself could only lay in my bed, I had thoughts of killing myself and I have never had those feelings before now. I feel like if I get sent home again and still can't move or do anything for myself, I might do it.\"     Patient was seen in the ED on 9/13/23 for complaints of pain and was discharged home after pain was controlled with Toradol.  She had THC, methamphetamine, amphetamine, and opiates on her UDS.  Dr. Meng in the ED noted the following about her pain:  she felt like something busted in her back like antifreeze in his radiating through her body. She says it has not gotten to her head or brain yet but it is coming up to her chest. Patient is in a lot of distress and unable to sit still for exam.     Patient reported in the ED on 9/17/23 that she had purchased THC in a dispensary in [place], but recently purchased some locally b/c she did not have transportation to travel to the dispensary.  She blamed the local THC for her positive UDS for methamphetamine.    Patient today is seen in her room.  She notes that she has no significant history of depression or other mental health " issues other than being started on low dose Cymbalta 20mg daily for depression some 6 months ago.  She notes no prior history of psych admissions or suicide attempts.    She notes that when she went home from the ED on the 13th, she found her self at home alone unable to walk and do things for herself.  She felt hopeless b/c she did not have an explanation for her weakness.  She came back to the ED on the 17th and as part of expressing the seriousness of her situation she expressed suicidal thoughts if her pain and leg weakness were not assessed and treated.  Patient denies any suicidal thoughts today and is hopeful that the hospitalist service will treat her.    Psychiatric Review Of Systems:  Pertinent items are noted in HPI.    Past Psychiatric History:    Psychiatric Hospitalizations: Patient has had no prior hospitalizations.    Suicide Attempts: Patient has had no prior suicide attempts.    Prior Treatment and Medications Tried: Started on Cymbalta 6 months for depression.  She has been on vistaril and trazodone per chart review.    History of violence or legal issues: The patient has no significant history of legal issues.    Social History:    Substance Abuse:  Tobacco:  1ppd  Alcohol: does not drink  Cannabis: regular daily use  Methamphetamine:  Denies use but UDS was positive on 9/13/23 but she states it was her local THC that was laced.  Opiate:  hx of opiate use by Rx 8yrs; UDS was positive on 9/13/23 and when I told her she is shocked.  Cocaine:  History of use about 10yrs ago.  Synthetic: does not use  IV drug use: denies     Marriages: twice  Current Relationships:   Children: 2 children (not local)    Abuse/Trauma: she denies    Education: some college   Occupation: on disability  Living Situation: alone    Firearms Access: denies other than a BB gun.    Social History     Socioeconomic History    Marital status: Single   Tobacco Use    Smoking status: Every Day     Packs/day: 1.00      "Types: Cigarettes    Smokeless tobacco: Never   Vaping Use    Vaping Use: Unknown   Substance and Sexual Activity    Alcohol use: Not Currently    Drug use: Yes     Types: Marijuana         Family History  Family History   Problem Relation Age of Onset    Hypertension Mother     Heart disease Mother     Diabetes Father     Cancer Father        Further details: sister attempted suicide, depression \"runs in the family\"    Past Medical History:    Past Medical History:   Diagnosis Date    Allergic     Arthritis     COPD (chronic obstructive pulmonary disease)     GERD (gastroesophageal reflux disease)     Hypertension     Infectious viral hepatitis     Low back pain      Past Surgical History:   Procedure Laterality Date    CHOLECYSTECTOMY      COLONOSCOPY N/A 1/21/2020    Procedure: COLONOSCOPY;  Surgeon: Joshua Perry MD;  Location: [facility] ENDOSCOPY;  Service: General    HYSTERECTOMY       Allergies:  Patient has no known allergies.    Prior to Admission Medications:  Medications Prior to Admission   Medication Sig Dispense Refill Last Dose    DULoxetine (CYMBALTA) 20 MG capsule Take 1 capsule by mouth Daily.   9/17/2023    fluconazole (DIFLUCAN) 200 MG tablet Take one at the first sign of infection and may repeat in 3 days as needed. 2 tablet 0 Past Month    lisinopril (PRINIVIL,ZESTRIL) 10 MG tablet Take 1 tablet by mouth 2 (Two) Times a Day.   9/17/2023       Medical Review Of Systems:  Reviewed review of systems from  Dr. Valero' note from today.    Agree with ROS as noted with any relevant updates:    Constitutional:  Negative for activity change, appetite change, chills, diaphoresis and fatigue.   HENT:  Negative for congestion, ear discharge, hearing loss, rhinorrhea, sinus pain, sneezing and sore throat.    Eyes:  Negative for photophobia, discharge and visual disturbance.   Respiratory:  Negative for cough, chest tightness, shortness of breath and wheezing.    Cardiovascular:  Negative for chest " pain and palpitations.   Gastrointestinal:  Negative for abdominal distention, abdominal pain, blood in stool, diarrhea, nausea and vomiting.   Endocrine: Negative for cold intolerance, heat intolerance, polydipsia, polyphagia and polyuria.   Genitourinary:  Negative for dysuria, flank pain, hematuria and urgency.   Musculoskeletal:  Negative for arthralgias, joint swelling and myalgias.   Skin:  Negative for color change.   Allergic/Immunologic: Negative for food allergies.   Neurological:  Negative for dizziness, seizures, syncope, speech difficulty, weakness and headaches.   Hematological:  Negative for adenopathy. Does not bruise/bleed easily.   Psychiatric/Behavioral:  Negative for confusion, hallucinations, self-injury and suicidal ideas. The patient is not nervous/anxious.  See above for details    All other systems reviewed and are negative.    Objective     Vital Signs    Temp:  [96 °F (35.6 °C)-98.6 °F (37 °C)] 96 °F (35.6 °C)  Heart Rate:  [] 63  Resp:  [16-20] 18  BP: (130-217)/() 186/95      09/17/23  1743 09/18/23  0252   Weight: 72.6 kg (160 lb) 70.8 kg (156 lb 1.6 oz)         Physical Exam:   General Appearance: alert, pleasant, interactive, and cooperative,  Hygiene:   good  Gait & Station:  in bed  Musculoskeletal: No tremors or abnormal involuntary movements    Mental Status Exam:   Cooperation:  Cooperative  Eye Contact:  Good  Psychomotor Behavior:  Appropriate  Mood: Worried  Affect:  normal  Speech:  Normal  Thought Process:  Coherent  Associations: Goal Directed  Thought Content:     Normal   Suicidal:   Denies currently   Homicidal:  None   Hallucinations:  None   Delusion:  None  Cognitive Functioning:   Consciousness: awake and alert   Orientation:  Person, Place, Time, and Situation   Attention: normal Concentration: Normal   Language:  Intact Vocabulary: Average   Short Term Memory: Intact   Long Term Memory: Intact   Fund of Knowledge: Average  Reliability:    adequate  Insight:  Fair  Judgement:  Fair  Impulse Control:  Fair    Diagnostic Data:    Lab Results: Results source: EMR   Recent Results (from the past 72 hour(s))   Comprehensive Metabolic Panel    Collection Time: 09/17/23  6:43 PM    Specimen: Blood   Result Value Ref Range    Glucose 108 (H) 65 - 99 mg/dL    BUN 22 (H) 6 - 20 mg/dL    Creatinine 1.05 (H) 0.57 - 1.00 mg/dL    Sodium 139 136 - 145 mmol/L    Potassium 4.2 3.5 - 5.2 mmol/L    Chloride 103 98 - 107 mmol/L    CO2 26.0 22.0 - 29.0 mmol/L    Calcium 9.0 8.6 - 10.5 mg/dL    Total Protein 7.0 6.0 - 8.5 g/dL    Albumin 4.0 3.5 - 5.2 g/dL    ALT (SGPT) 41 (H) 1 - 33 U/L    AST (SGOT) 37 (H) 1 - 32 U/L    Alkaline Phosphatase 129 (H) 39 - 117 U/L    Total Bilirubin 0.5 0.0 - 1.2 mg/dL    Globulin 3.0 gm/dL    A/G Ratio 1.3 g/dL    BUN/Creatinine Ratio 21.0 7.0 - 25.0    Anion Gap 10.0 5.0 - 15.0 mmol/L    eGFR 61.3 >60.0 mL/min/1.73   CK    Collection Time: 09/17/23  6:43 PM    Specimen: Blood   Result Value Ref Range    Creatine Kinase 56 20 - 180 U/L   Magnesium    Collection Time: 09/17/23  6:43 PM    Specimen: Blood   Result Value Ref Range    Magnesium 2.0 1.6 - 2.6 mg/dL   CBC Auto Differential    Collection Time: 09/17/23  6:43 PM    Specimen: Blood   Result Value Ref Range    WBC 8.17 3.40 - 10.80 10*3/mm3    RBC 5.42 (H) 3.77 - 5.28 10*6/mm3    Hemoglobin 16.4 (H) 12.0 - 15.9 g/dL    Hematocrit 47.4 (H) 34.0 - 46.6 %    MCV 87.5 79.0 - 97.0 fL    MCH 30.3 26.6 - 33.0 pg    MCHC 34.6 31.5 - 35.7 g/dL    RDW 12.3 12.3 - 15.4 %    RDW-SD 39.1 37.0 - 54.0 fl    MPV 9.8 6.0 - 12.0 fL    Platelets 236 140 - 450 10*3/mm3    Neutrophil % 49.2 42.7 - 76.0 %    Lymphocyte % 37.9 19.6 - 45.3 %    Monocyte % 8.7 5.0 - 12.0 %    Eosinophil % 2.9 0.3 - 6.2 %    Basophil % 1.1 0.0 - 1.5 %    Immature Grans % 0.2 0.0 - 0.5 %    Neutrophils, Absolute 4.01 1.70 - 7.00 10*3/mm3    Lymphocytes, Absolute 3.10 0.70 - 3.10 10*3/mm3    Monocytes, Absolute 0.71 0.10 -  0.90 10*3/mm3    Eosinophils, Absolute 0.24 0.00 - 0.40 10*3/mm3    Basophils, Absolute 0.09 0.00 - 0.20 10*3/mm3    Immature Grans, Absolute 0.02 0.00 - 0.05 10*3/mm3    nRBC 0.0 0.0 - 0.2 /100 WBC   Gold Top - SST    Collection Time: 09/17/23  6:43 PM   Result Value Ref Range    Extra Tube Hold for add-ons.    Gray Top    Collection Time: 09/17/23  6:43 PM   Result Value Ref Range    Extra Tube Hold for add-ons.    Light Blue Top    Collection Time: 09/17/23  6:43 PM   Result Value Ref Range    Extra Tube Hold for add-ons.    Acetaminophen Level    Collection Time: 09/17/23  6:43 PM    Specimen: Blood   Result Value Ref Range    Acetaminophen <5.0 0.0 - 30.0 mcg/mL   Ethanol    Collection Time: 09/17/23  6:43 PM    Specimen: Blood   Result Value Ref Range    Ethanol <10 0 - 10 mg/dL    Ethanol % <0.010 %   Salicylate Level    Collection Time: 09/17/23  6:43 PM    Specimen: Blood   Result Value Ref Range    Salicylate <0.3 <=30.0 mg/dL   Urinalysis With Culture If Indicated - Urine, Clean Catch    Collection Time: 09/17/23  6:47 PM    Specimen: Urine, Clean Catch   Result Value Ref Range    Color, UA Yellow Yellow, Straw, Dark Yellow, Patricia    Appearance, UA Clear Clear    pH, UA 5.5 5.0 - 9.0    Specific Gravity, UA 1.020 1.003 - 1.030    Glucose, UA Negative Negative    Ketones, UA Negative Negative    Bilirubin, UA Negative Negative    Blood, UA Negative Negative    Protein, UA Negative Negative    Leuk Esterase, UA Trace (A) Negative    Nitrite, UA Negative Negative    Urobilinogen, UA 1.0 E.U./dL 0.2 - 1.0 E.U./dL   Urine Drug Screen - Urine, Clean Catch    Collection Time: 09/17/23  6:47 PM    Specimen: Urine, Clean Catch   Result Value Ref Range    THC, Screen, Urine Positive (A) Negative    Phencyclidine (PCP), Urine Negative Negative    Cocaine Screen, Urine Negative Negative    Methamphetamine, Ur Negative Negative    Opiate Screen Negative Negative    Amphetamine Screen, Urine Negative Negative     Benzodiazepine Screen, Urine Negative Negative    Tricyclic Antidepressants Screen Negative Negative    Methadone Screen, Urine Negative Negative    Barbiturates Screen, Urine Negative Negative    Oxycodone Screen, Urine Negative Negative    Propoxyphene Screen Negative Negative    Buprenorphine, Screen, Urine Negative Negative   Fentanyl, Urine - Urine, Clean Catch    Collection Time: 09/17/23  6:47 PM    Specimen: Urine, Clean Catch   Result Value Ref Range    Fentanyl, Urine Negative Negative   Urinalysis, Microscopic Only - Urine, Clean Catch    Collection Time: 09/17/23  6:47 PM    Specimen: Urine, Clean Catch   Result Value Ref Range    RBC, UA 0-2 (A) None Seen /HPF    WBC, UA 0-2 None Seen, 0-2, 3-5 /HPF    Bacteria, UA None Seen None Seen /HPF    Squamous Epithelial Cells, UA 0-2 None Seen, 0-2 /HPF    Hyaline Casts, UA None Seen None Seen /LPF    Methodology Automated Microscopy    ECG 12 Lead Stroke Evaluation    Collection Time: 09/17/23 10:20 PM   Result Value Ref Range    QT Interval 386 ms    QTC Interval 448 ms   CBC Auto Differential    Collection Time: 09/18/23  8:31 AM    Specimen: Blood   Result Value Ref Range    WBC 6.91 3.40 - 10.80 10*3/mm3    RBC 4.96 3.77 - 5.28 10*6/mm3    Hemoglobin 15.0 12.0 - 15.9 g/dL    Hematocrit 43.0 34.0 - 46.6 %    MCV 86.7 79.0 - 97.0 fL    MCH 30.2 26.6 - 33.0 pg    MCHC 34.9 31.5 - 35.7 g/dL    RDW 12.4 12.3 - 15.4 %    RDW-SD 39.0 37.0 - 54.0 fl    MPV 9.6 6.0 - 12.0 fL    Platelets 195 140 - 450 10*3/mm3    Neutrophil % 49.9 42.7 - 76.0 %    Lymphocyte % 37.3 19.6 - 45.3 %    Monocyte % 7.1 5.0 - 12.0 %    Eosinophil % 4.3 0.3 - 6.2 %    Basophil % 1.0 0.0 - 1.5 %    Immature Grans % 0.4 0.0 - 0.5 %    Neutrophils, Absolute 3.44 1.70 - 7.00 10*3/mm3    Lymphocytes, Absolute 2.58 0.70 - 3.10 10*3/mm3    Monocytes, Absolute 0.49 0.10 - 0.90 10*3/mm3    Eosinophils, Absolute 0.30 0.00 - 0.40 10*3/mm3    Basophils, Absolute 0.07 0.00 - 0.20 10*3/mm3    Immature  Grans, Absolute 0.03 0.00 - 0.05 10*3/mm3    nRBC 0.0 0.0 - 0.2 /100 WBC   Green Top (Gel)    Collection Time: 09/18/23  8:31 AM   Result Value Ref Range    Extra Tube Hold for add-ons.    ECG 12 Lead Rhythm Change    Collection Time: 09/18/23  1:47 PM   Result Value Ref Range    QT Interval 444 ms    QTC Interval 446 ms       No results found for: GLUF     No results found for: HGBA1C    No results found for: CHOL, TRIG, HDL, LDL, VLDL, LDLHDL     No results found for: TSH    No results found for: FREET4    No results found for: QAZJ09XD, UVVCRPJN27, FOLATE    No results found for: HCGQUAL    Imaging Results:  CT Abdomen Pelvis Without Contrast    Result Date: 9/17/2023  Narrative: CT ABDOMEN PELVIS WO CONTRAST HISTORY: Abdominal pain, acute, nonlocalized COMPARISON: CT abdomen pelvis 9/13/2023 Automated exposure control was also utilized to decrease patient radiation dose. TECHNIQUE: Images of the abdomen and pelvis were obtained.  No IV contrast was ministered.  Multiplanar reformatted images were provided for review. FINDINGS: Mild subsegmental atelectasis in the middle lobe. Visualized mediastinal structures are normal. Osseous structures are age-appropriate. Liver is normal. Spleen is unremarkable. Bilateral kidneys are within normal limits.  No renal calculus or obstructive uropathy. Bilateral adrenal glands are normal. Pancreas is normal. Cholecystectomy. No bowel obstruction. The rectum is normal. Bladder is normal. No free air or free fluid. No lymphadenopathy.     Impression: 1.  No acute inflammatory process throughout the abdomen or pelvis.     CT Head Without Contrast    Result Date: 9/17/2023  Narrative: HEAD CT WITHOUT IV CONTRAST HISTORY: Unable to feel legs. COMPARISON: None. TECHNIQUE: Routine helical imaging through the brain with axial, coronal and sagittal two-dimensional reformatted images. FINDINGS: There is no evidence of acute intracranial hemorrhage, mass effect or midline shift. No  abnormal extra-axial fluid collection. No areas of abnormal attenuation within the brain parenchyma to suggest an acute infarction. The ventricles, cisterns, and sulci are within normal limits in size and configuration. The visualized skull base structures and paranasal sinuses are unremarkable. No calvarial fracture or other lesion.     Impression: No acute intracranial process.    MRI Brain Without Contrast    Result Date: 9/18/2023  Narrative: INDICATION: Neuro deficit, acute, stroke suspected. COMPARISON: None relevant. TECHNIQUE: Multisequence, multiplanar MRI of the brain was performed without intravenous contrast. FINDINGS: No acute infarction, hemorrhage, extra-axial fluid collection, hydrocephalus, or mass.  Mild/moderate supratentorial white matter changes.  Mild mucosal thickening right maxillary sinus.     Impression: No acute intracranial abnormality. Mild/moderate white matter changes.  Differentials include microangiopathy and sequela of migraine headaches     MRI Lumbar Spine Without Contrast    Result Date: 9/18/2023  Narrative: HISTORY: Low back pain, cauda equina syndrome suspected COMPARISON: None. TECHNIQUE: Multiplanar multisequence images were obtained of the lumbar spine. FINDINGS: Normal alignment of the lumbar vertebra. Bone marrow signal is age-appropriate. There is no fracture. DEGENERATIVE CHANGES: L5-S1: Moderate facet hypertrophy left greater than right.  Mild degenerative disc disease.  No significant disc herniation.  Moderate left-sided foraminal narrowing. L4-5: Moderate facet hypertrophy.  Mild degenerative disc disease.  Smallest bulge.  Moderate spinal stenosis prominent transverse dimension and moderate bilateral foraminal narrowing. L3-4: Moderate degenerative disc disease.  Smallest bulge and facet hypertrophy. Mild spinal stenosis moderate bilateral foraminal narrowing. L2-3: Mild degenerative disease.  Tiny disc bulge without significant spinal stenosis.  Mild bilateral  foraminal narrowing L1-2: Tiny disc bulge without spinal stenosis or foraminal narrowing     Impression: 1.  Changes of spondylosis and facet hypertrophy as detailed above.    US Arterial Doppler Lower Extremity Complete    Result Date: 9/18/2023  Narrative: Indication: Bilateral lower extremity weakness TECHNIQUE: High-resolution gray scale imaging, color flow Doppler, spectral waveform analysis and ankle/brachial indices were obtained for the bilateral lower extremity. Comparison: None FINDINGS: There is normal multiphasic flow identified throughout the arterial vasculature of the bilateral lower extremity.  Ankle/brachial indices were not obtained.  No hemodynamically significant stenosis is noted.     Impression: Normal arterial duplex ultrasound of the bilateral lower extremity based on normal arterial waveforms.     CT Abdomen Pelvis Stone Protocol    Result Date: 9/13/2023  Narrative: CT ABDOMEN AND PELVIS WITHOUT CONTRAST KIDNEY STONE PROTOCOL HISTORY: Flank pain, kidney stone suspected. COMPARISON: None available. TECHNIQUE: Tomographic images of the abdomen and pelvis were obtained without the use of intravenous contrast. Multiplanar reformatted images were provided for review. FINDINGS: Normal lung bases. Nonacute noncontrast appearance of the upper abdominal viscera. Symmetric appearance of the kidneys. No dilation of the urinary collecting system. Grossly unremarkable appearance of the large and small bowel. Normal appendix. Normal caliber aorta with scattered atheromatous change. Symmetric appearance of the soft tissues. No acute osseous abnormalities. More chronic appearing Schmorl's node at L4.     Impression: No acute abdominopelvic findings. No clear renal calculi. No hydronephrosis.     Assessment & Plan       Lower extremity weakness    Depression    Adjustment disorder with depressed mood    Cannabis abuse    Positive urine drug screen for methamphetamine    Recommendations:    Adjustment  reaction:   --supportive care and addressing her medical issues should help alleviate symptoms.    Suicidal statements:   --On 1:1 and suicide watch.   --Will plan to stop watch if she continues to deny SI tomorrow.    Cannabis/Meth:   --Encourage sobriety    Depression:   --Continue Cymbalta 20mg daily.  Offered to increase due to low dose but patient requested to keep current dose.    Ability and Capacity to Respond to Treatment: fair    Thank you for allowing me to participate in the care of this patient.    Erma Sandoval MD  09/18/23  14:39 CDT

## 2023-09-18 NOTE — NURSING NOTE
Patient was sent up from the ED and was demanding medications and nicotine patch, patient was told that the Dr had not put in the orders yet but as soon as they were that this nurse would get her medication. Patient waited for staff to leave and lit a cigarette in the room and was smoking. Security came and took her cigarettes and lighter. Dr. Linares went and spoke with the patient and she stated she was suicidal when she is hurting. Patient is now on suicide precautions with a sitter. Since being placed on precautions patient has been agitated, and argumentative.

## 2023-09-18 NOTE — SIGNIFICANT NOTE
09/18/23 1441   OTHER   Discipline occupational therapist   Rehab Time/Intention   Session Not Performed patient unavailable for evaluation   Recommendation   OT - Next Appointment 09/19/23     Therapy evaluation attempted, patient off unit at MRI. Will continue to follow and attempt evaluation as able.

## 2023-09-18 NOTE — SIGNIFICANT NOTE
09/18/23 1039   OTHER   Discipline physical therapist   Rehab Time/Intention   Session Not Performed patient unavailable for evaluation;other (see comments)  (Patient currently RUSTY for MRI. PT to follow up as able.)   Recommendation   PT - Next Appointment 09/19/23     PT initial evaluation attempted X 2 this date. RN reports that patient is preparing for an MRI of cervical spine. PT to follow up when MRI is read.

## 2023-09-18 NOTE — PLAN OF CARE
Goal Outcome Evaluation:      New admission from ED VSS A&Ox4 iv fluids infusing as ordered, sitter at bedside. Will continue with plan of care as ordered.

## 2023-09-18 NOTE — ED NOTES
Nursing report ED to floor  Hanna Escalante  57 y.o.  female    HPI:   Chief Complaint   Patient presents with    Weakness - Generalized       Admitting doctor:   Collin Linares MD    Consulting provider(s):  Consults       Date and Time Order Name Status Description    9/17/2023  9:46 PM Inpatient Neurology Consult Stroke      9/17/2023  9:46 PM Hospitalist (on-call MD unless specified)               Admitting diagnosis:   The encounter diagnosis was Weakness of left lower extremity.    Code status:   Current Code Status       Date Active Code Status Order ID Comments User Context       Not on file            Allergies:   Patient has no known allergies.    Intake and Output    Intake/Output Summary (Last 24 hours) at 9/17/2023 2209  Last data filed at 9/17/2023 2157  Gross per 24 hour   Intake 1000 ml   Output --   Net 1000 ml       Weight:       09/17/23  1743   Weight: 72.6 kg (160 lb)       Most recent vitals:   Vitals:    09/17/23 1818 09/17/23 1945 09/17/23 2054 09/17/23 2156   BP: (!) 177/112 (!) 181/83  159/84   BP Location:    Right arm   Patient Position:    Sitting   Pulse: 96 90 106 84   Resp:    18   Temp:       TempSrc:       SpO2: 98%   97%   Weight:       Height:         Oxygen Therapy: Room Air     Active LDAs/IV Access:   Lines, Drains & Airways       Active LDAs       Name Placement date Placement time Site Days    Peripheral IV 09/17/23 1831 Left;Posterior Hand 09/17/23 1831  Hand  less than 1                    Labs (abnormal labs have a star):   Labs Reviewed   COMPREHENSIVE METABOLIC PANEL - Abnormal; Notable for the following components:       Result Value    Glucose 108 (*)     BUN 22 (*)     Creatinine 1.05 (*)     ALT (SGPT) 41 (*)     AST (SGOT) 37 (*)     Alkaline Phosphatase 129 (*)     All other components within normal limits    Narrative:     GFR Normal >60  Chronic Kidney Disease <60  Kidney Failure <15     URINALYSIS W/ CULTURE IF INDICATED - Abnormal; Notable for the following  components:    Leuk Esterase, UA Trace (*)     All other components within normal limits    Narrative:     In absence of clinical symptoms, the presence of pyuria, bacteria, and/or nitrites on the urinalysis result does not correlate with infection.   URINE DRUG SCREEN - Abnormal; Notable for the following components:    THC, Screen, Urine Positive (*)     All other components within normal limits    Narrative:     Cutoff For Drugs Screened:    Amphetamines               500 ng/ml  Barbiturates               200 ng/ml  Benzodiazepines            150 ng/ml  Cocaine                    150 ng/ml  Methadone                  200 ng/ml  Opiates                    100 ng/ml  Phencyclidine               25 ng/ml  THC                            50 ng/ml  Methamphetamine            500 ng/ml  Tricyclic Antidepressants  300 ng/ml  Oxycodone                  100 ng/ml  Propoxyphene               300 ng/ml  Buprenorphine               10 ng/ml    The normal value for all drugs tested is negative. This report includes unconfirmed screening results, with the cutoff values listed, to be used for medical treatment purposes only.  Unconfirmed results must not be used for non-medical purposes such as employment or legal testing.  Clinical consideration should be applied to any drug of abuse test, particularly when unconfirmed results are used.     CBC WITH AUTO DIFFERENTIAL - Abnormal; Notable for the following components:    RBC 5.42 (*)     Hemoglobin 16.4 (*)     Hematocrit 47.4 (*)     All other components within normal limits   URINALYSIS, MICROSCOPIC ONLY - Abnormal; Notable for the following components:    RBC, UA 0-2 (*)     All other components within normal limits   CK - Normal   MAGNESIUM - Normal   FENTANYL, URINE - Normal    Narrative:     Negative Threshold:      Fentanyl 5 ng/mL     The normal value for the drug tested is negative. This report includes final unconfirmed screening results to be used for medical treatment  purposes only. Unconfirmed results must not be used for non-medical purposes such as employment or legal testing. Clinical consideration should be applied to any drug of abuse test, particularly when unconfirmed results are used.          ACETAMINOPHEN LEVEL - Normal   SALICYLATE LEVEL - Normal   ETHANOL   CBC AND DIFFERENTIAL    Narrative:     The following orders were created for panel order CBC & Differential.  Procedure                               Abnormality         Status                     ---------                               -----------         ------                     CBC Auto Differential[[id number]]        Abnormal            Final result                 Please view results for these tests on the individual orders.   GOLD TOP - SST   LIGHT BLUE TOP   EXTRA TUBES    Narrative:     The following orders were created for panel order Extra Tubes.  Procedure                               Abnormality         Status                     ---------                               -----------         ------                     Gold Top - SST[[id number]]                                   Final result               Gray Top[[id number]]                                         In process                 Light Blue Top[[id number]]                                   Final result                 Please view results for these tests on the individual orders.   GRAY TOP       Meds given in ED:   Medications   sodium chloride 0.9 % bolus 1,000 mL (0 mL Intravenous Stopped 9/17/23 2157)   ketorolac (TORADOL) injection 15 mg (15 mg Intravenous Given 9/17/23 1842)   hydrALAZINE (APRESOLINE) injection 20 mg (20 mg Intravenous Given 9/17/23 1841)   orphenadrine (NORFLEX) injection 60 mg (60 mg Intravenous Given 9/17/23 1957)     No current facility-administered medications for this encounter.       NIH Stroke Scale:       Isolation/Infection(s):  No active isolations   No active infections     COVID Testing  Collected No  Resulted  N/A    Nursing report ED to floor:  Mental status: A&O x4  Ambulatory status: Assist x2  Precautions: Falls    ED nurse phone extentsion- [phone] or [phone]

## 2023-09-18 NOTE — CONSULTS
NEUROLOGY CONSULT NOTE    Hanna Escalante      [id number]         1965 9/18/2023            11:53 CDT    Encounter Date: 9/17/2023     Provider Location: other: [facility]    Patient identification confirmed by two-factor authentication: Yes    Patient Location: [place]    I attest the following telehealth platform was used during the video visit: Zoom    Primary Care Physician:  Ninfa Bhatti APRN    Primary Neurologist:  N/A    Reason for Consult:  LE weakness    Consult Requested By:  Collin Linares MD             History of Present Illness  The tele-neuro consult is conducted via secured Zoom with assistance of neuro STEF Justo Avina NP at the bedside.      Patient presents to the emergency department with back pain that began 4 days PTA.  she also c/o radicular pain to her LLE and LLE weakness, gait instability. I was consulted by phone initially spoke With Dr Mckeon at [place] [facility]. Recommended out patient neuro referral. However, I was informed that the patient also has significant psychiatric history, and currently she is suicidal, she will be admitted for psych eval, and an inpatient neuro consult is placed.     This morning at the bedside, neuro assessment is notable for LLE asymmetric hyperreflexia. I have personally reviewed, her MRI brain, which showed no evidence of CVA, lumbar spine MRI is unremarkable. Will expand MRI to cervical and thoracic spine.     Past Medical History  Past Medical History:   Diagnosis Date    Allergic     Arthritis     COPD (chronic obstructive pulmonary disease)     GERD (gastroesophageal reflux disease)     Hypertension     Infectious viral hepatitis     Low back pain        Past Surgical History  Past Surgical History:   Procedure Laterality Date    CHOLECYSTECTOMY      COLONOSCOPY N/A 1/21/2020    Procedure: COLONOSCOPY;  Surgeon: Joshua Perry MD;  Location: [facility] ENDOSCOPY;  Service: General    HYSTERECTOMY          Medications  Current Facility-Administered Medications   Medication Dose Route Frequency Provider Last Rate Last Admin    acetaminophen (TYLENOL) tablet 650 mg  650 mg Oral Q4H PRN Collin Linares MD        Or    acetaminophen (TYLENOL) 160 MG/5ML oral solution 650 mg  650 mg Oral Q4H PRN Collin Linares MD        Or    acetaminophen (TYLENOL) suppository 650 mg  650 mg Rectal Q4H PRN Collin Linares MD        sennosides-docusate (PERICOLACE) 8.6-50 MG per tablet 2 tablet  2 tablet Oral BID Collin Linares MD   2 tablet at 09/18/23 0904    And    polyethylene glycol (MIRALAX) packet 17 g  17 g Oral Daily PRN Collin Linares MD        And    bisacodyl (DULCOLAX) EC tablet 5 mg  5 mg Oral Daily PRN Collin Linares MD        And    bisacodyl (DULCOLAX) suppository 10 mg  10 mg Rectal Daily PRN Collin Linares MD        calcium carbonate (TUMS) chewable tablet 500 mg (200 mg elemental)  1 tablet Oral BID PRN Collin Linares MD        Calcium Replacement - Follow Nurse / BPA Driven Protocol   Does not apply Collin Gan MD        cyclobenzaprine (FLEXERIL) tablet 10 mg  10 mg Oral TID PRN Edis Valero MD        DULoxetine (CYMBALTA) DR capsule 20 mg  20 mg Oral Daily Collin Linares MD   20 mg at 09/18/23 0904    Enoxaparin Sodium (LOVENOX) syringe 40 mg  40 mg Subcutaneous Daily Collin Linares MD   40 mg at 09/18/23 0313    lactated ringers infusion  100 mL/hr Intravenous Continuous Edis Valero  mL/hr at 09/18/23 1017 100 mL/hr at 09/18/23 1017    lisinopril (PRINIVIL,ZESTRIL) tablet 10 mg  10 mg Oral BID Collin Linares MD   10 mg at 09/18/23 0904    Magnesium Low Dose Replacement - Follow Nurse / BPA Driven Protocol   Does not apply PRN Collin Linares MD        melatonin tablet 5 mg  5 mg Oral Nightly PRN Collin Linarse MD        morphine injection 2 mg  2 mg Intravenous Q4H PRN Collin Linares MD   2 mg at 09/18/23 0906    And    naloxone (NARCAN) injection 0.4 mg  0.4 mg Intravenous Q5 Min PRN Vijay  Collin MOISE MD        morphine injection 4 mg  4 mg Intravenous Q4H PRN Collin Linares MD   4 mg at 09/18/23 0315    And    naloxone (NARCAN) injection 0.4 mg  0.4 mg Intravenous Q5 Min PRN Collin Linares MD        nicotine (NICODERM CQ) 21 MG/24HR patch 1 patch  1 patch Transdermal Q24H Collin Linares MD   1 patch at 09/18/23 0116    nitroglycerin (NITROSTAT) SL tablet 0.4 mg  0.4 mg Sublingual Q5 Min PRN Collin Linares MD        ondansetron (ZOFRAN) tablet 4 mg  4 mg Oral Q6H PRN Collin Linares MD        Or    ondansetron (ZOFRAN) injection 4 mg  4 mg Intravenous Q6H PRN Collin Linares MD        Phosphorus Replacement - Follow Nurse / BPA Driven Protocol   Does not apply Collin Gan MD        Potassium Replacement - Follow Nurse / BPA Driven Protocol   Does not apply Collin Gan MD        sodium chloride 0.9 % flush 10 mL  10 mL Intravenous Q12H Collin Linares MD   10 mL at 09/18/23 0904    sodium chloride 0.9 % flush 10 mL  10 mL Intravenous PRN Collin Linares MD        sodium chloride 0.9 % infusion 40 mL  40 mL Intravenous PRN Collin Linares MD        SUMAtriptan (IMITREX) tablet 50 mg  50 mg Oral Q2H PRN Collin Linares MD           Social History  Social History     Socioeconomic History    Marital status: Single   Tobacco Use    Smoking status: Every Day     Packs/day: 1.00     Types: Cigarettes    Smokeless tobacco: Never   Vaping Use    Vaping Use: Unknown   Substance and Sexual Activity    Alcohol use: Not Currently    Drug use: Yes     Types: Marijuana       Family History  Family History   Problem Relation Age of Onset    Hypertension Mother     Heart disease Mother     Diabetes Father     Cancer Father        Review of Systems  Ten organ based ROS was performed and negative except for positives mentioned in the HPI.    Physical Examination    Vital Signs  Temp: 96 °F (35.6 °C) BP: (!) 186/95 Heart Rate: 63 Resp: 18 SpO2: 93 %       Intake/Output Summary (Last 24 hours) at 9/18/2023 1153  Last  data filed at 9/18/2023 0843  Gross per 24 hour   Intake 1240 ml   Output --   Net 1240 ml     Baseline Weight: 72.6 kg (160 lb)  Most recent Weight: 70.8 kg (156 lb 1.6 oz)    Body mass index is 29.49 kg/m².     General Exam  MENTATION:  Normal; depressed mood.   CONSTITUTIONAL:   no acute distress; appears documented age.  HEENT:  Normocephalic/atraumatic; EOMI/PERRL; face symmetric; tongue midline.  NECK:  Supple; no bruits.  RESPIRATORY:  Clear to auscultation bilaterally; no wheezes or crackles.  CARDIOVASCULAR:  Regular rate and rhythm; no murmurs.  GASTROINTESNTINAL:  Soft; nontender; bowel sounds present.  SKIN:  No rashes or other lesions.  MUSCULOSKELETAL:  No deformities; normal bulk and tone.  EXTREMITIES:  No cyanosis, clubbing, or edema.  PSYCHIATRIC:  Affect appropriate; no hallucinations or agitation; normal attention span and concentration.    Neurological Exam  LEVEL OF CONSCIOUSNESS:  Awake, alert, and oriented x 4.  HIGHER CORTICAL FUNCTIONS:  No aphasia, apraxia, or agnosia.  CRANIAL NERVES:  EOMI/PERRL; visual fields full; face symmetric; tongue midline; facial sensation intact; sternocleidomastoid 5/5.  MOTOR:  Normal muscle bulk, tone, and strength bilaterally; no atrophy and no fasciculations; no abnormal movements.  SENSATION:  Intact to light touch, temperature/pain, vibration, and position modalities.  REFLEXES:  asymmetric LLE hyperreflexia  COORDINATION:  finger-to-nose testing normal, without dysmetria; fine finger motions normal and symmetric.  GAIT:  unsteady baseline gait.     Laboratory Results  Recent Results (from the past 24 hour(s))   Comprehensive Metabolic Panel    Collection Time: 09/17/23  6:43 PM    Specimen: Blood   Result Value Ref Range    Glucose 108 (H) 65 - 99 mg/dL    BUN 22 (H) 6 - 20 mg/dL    Creatinine 1.05 (H) 0.57 - 1.00 mg/dL    Sodium 139 136 - 145 mmol/L    Potassium 4.2 3.5 - 5.2 mmol/L    Chloride 103 98 - 107 mmol/L    CO2 26.0 22.0 - 29.0 mmol/L     Calcium 9.0 8.6 - 10.5 mg/dL    Total Protein 7.0 6.0 - 8.5 g/dL    Albumin 4.0 3.5 - 5.2 g/dL    ALT (SGPT) 41 (H) 1 - 33 U/L    AST (SGOT) 37 (H) 1 - 32 U/L    Alkaline Phosphatase 129 (H) 39 - 117 U/L    Total Bilirubin 0.5 0.0 - 1.2 mg/dL    Globulin 3.0 gm/dL    A/G Ratio 1.3 g/dL    BUN/Creatinine Ratio 21.0 7.0 - 25.0    Anion Gap 10.0 5.0 - 15.0 mmol/L    eGFR 61.3 >60.0 mL/min/1.73   CK    Collection Time: 09/17/23  6:43 PM    Specimen: Blood   Result Value Ref Range    Creatine Kinase 56 20 - 180 U/L   Magnesium    Collection Time: 09/17/23  6:43 PM    Specimen: Blood   Result Value Ref Range    Magnesium 2.0 1.6 - 2.6 mg/dL   CBC Auto Differential    Collection Time: 09/17/23  6:43 PM    Specimen: Blood   Result Value Ref Range    WBC 8.17 3.40 - 10.80 10*3/mm3    RBC 5.42 (H) 3.77 - 5.28 10*6/mm3    Hemoglobin 16.4 (H) 12.0 - 15.9 g/dL    Hematocrit 47.4 (H) 34.0 - 46.6 %    MCV 87.5 79.0 - 97.0 fL    MCH 30.3 26.6 - 33.0 pg    MCHC 34.6 31.5 - 35.7 g/dL    RDW 12.3 12.3 - 15.4 %    RDW-SD 39.1 37.0 - 54.0 fl    MPV 9.8 6.0 - 12.0 fL    Platelets 236 140 - 450 10*3/mm3    Neutrophil % 49.2 42.7 - 76.0 %    Lymphocyte % 37.9 19.6 - 45.3 %    Monocyte % 8.7 5.0 - 12.0 %    Eosinophil % 2.9 0.3 - 6.2 %    Basophil % 1.1 0.0 - 1.5 %    Immature Grans % 0.2 0.0 - 0.5 %    Neutrophils, Absolute 4.01 1.70 - 7.00 10*3/mm3    Lymphocytes, Absolute 3.10 0.70 - 3.10 10*3/mm3    Monocytes, Absolute 0.71 0.10 - 0.90 10*3/mm3    Eosinophils, Absolute 0.24 0.00 - 0.40 10*3/mm3    Basophils, Absolute 0.09 0.00 - 0.20 10*3/mm3    Immature Grans, Absolute 0.02 0.00 - 0.05 10*3/mm3    nRBC 0.0 0.0 - 0.2 /100 WBC   Gold Top - SST    Collection Time: 09/17/23  6:43 PM   Result Value Ref Range    Extra Tube Hold for add-ons.    Gray Top    Collection Time: 09/17/23  6:43 PM   Result Value Ref Range    Extra Tube Hold for add-ons.    Light Blue Top    Collection Time: 09/17/23  6:43 PM   Result Value Ref Range    Extra Tube  Hold for add-ons.    Acetaminophen Level    Collection Time: 09/17/23  6:43 PM    Specimen: Blood   Result Value Ref Range    Acetaminophen <5.0 0.0 - 30.0 mcg/mL   Ethanol    Collection Time: 09/17/23  6:43 PM    Specimen: Blood   Result Value Ref Range    Ethanol <10 0 - 10 mg/dL    Ethanol % <0.010 %   Salicylate Level    Collection Time: 09/17/23  6:43 PM    Specimen: Blood   Result Value Ref Range    Salicylate <0.3 <=30.0 mg/dL   Urinalysis With Culture If Indicated - Urine, Clean Catch    Collection Time: 09/17/23  6:47 PM    Specimen: Urine, Clean Catch   Result Value Ref Range    Color, UA Yellow Yellow, Straw, Dark Yellow, Patricia    Appearance, UA Clear Clear    pH, UA 5.5 5.0 - 9.0    Specific Gravity, UA 1.020 1.003 - 1.030    Glucose, UA Negative Negative    Ketones, UA Negative Negative    Bilirubin, UA Negative Negative    Blood, UA Negative Negative    Protein, UA Negative Negative    Leuk Esterase, UA Trace (A) Negative    Nitrite, UA Negative Negative    Urobilinogen, UA 1.0 E.U./dL 0.2 - 1.0 E.U./dL   Urine Drug Screen - Urine, Clean Catch    Collection Time: 09/17/23  6:47 PM    Specimen: Urine, Clean Catch   Result Value Ref Range    THC, Screen, Urine Positive (A) Negative    Phencyclidine (PCP), Urine Negative Negative    Cocaine Screen, Urine Negative Negative    Methamphetamine, Ur Negative Negative    Opiate Screen Negative Negative    Amphetamine Screen, Urine Negative Negative    Benzodiazepine Screen, Urine Negative Negative    Tricyclic Antidepressants Screen Negative Negative    Methadone Screen, Urine Negative Negative    Barbiturates Screen, Urine Negative Negative    Oxycodone Screen, Urine Negative Negative    Propoxyphene Screen Negative Negative    Buprenorphine, Screen, Urine Negative Negative   Fentanyl, Urine - Urine, Clean Catch    Collection Time: 09/17/23  6:47 PM    Specimen: Urine, Clean Catch   Result Value Ref Range    Fentanyl, Urine Negative Negative   Urinalysis,  Microscopic Only - Urine, Clean Catch    Collection Time: 09/17/23  6:47 PM    Specimen: Urine, Clean Catch   Result Value Ref Range    RBC, UA 0-2 (A) None Seen /HPF    WBC, UA 0-2 None Seen, 0-2, 3-5 /HPF    Bacteria, UA None Seen None Seen /HPF    Squamous Epithelial Cells, UA 0-2 None Seen, 0-2 /HPF    Hyaline Casts, UA None Seen None Seen /LPF    Methodology Automated Microscopy    ECG 12 Lead Stroke Evaluation    Collection Time: 09/17/23 10:20 PM   Result Value Ref Range    QT Interval 386 ms    QTC Interval 448 ms   CBC Auto Differential    Collection Time: 09/18/23  8:31 AM    Specimen: Blood   Result Value Ref Range    WBC 6.91 3.40 - 10.80 10*3/mm3    RBC 4.96 3.77 - 5.28 10*6/mm3    Hemoglobin 15.0 12.0 - 15.9 g/dL    Hematocrit 43.0 34.0 - 46.6 %    MCV 86.7 79.0 - 97.0 fL    MCH 30.2 26.6 - 33.0 pg    MCHC 34.9 31.5 - 35.7 g/dL    RDW 12.4 12.3 - 15.4 %    RDW-SD 39.0 37.0 - 54.0 fl    MPV 9.6 6.0 - 12.0 fL    Platelets 195 140 - 450 10*3/mm3    Neutrophil % 49.9 42.7 - 76.0 %    Lymphocyte % 37.3 19.6 - 45.3 %    Monocyte % 7.1 5.0 - 12.0 %    Eosinophil % 4.3 0.3 - 6.2 %    Basophil % 1.0 0.0 - 1.5 %    Immature Grans % 0.4 0.0 - 0.5 %    Neutrophils, Absolute 3.44 1.70 - 7.00 10*3/mm3    Lymphocytes, Absolute 2.58 0.70 - 3.10 10*3/mm3    Monocytes, Absolute 0.49 0.10 - 0.90 10*3/mm3    Eosinophils, Absolute 0.30 0.00 - 0.40 10*3/mm3    Basophils, Absolute 0.07 0.00 - 0.20 10*3/mm3    Immature Grans, Absolute 0.03 0.00 - 0.05 10*3/mm3    nRBC 0.0 0.0 - 0.2 /100 WBC   Green Top (Gel)    Collection Time: 09/18/23  8:31 AM   Result Value Ref Range    Extra Tube Hold for add-ons.        Radiological Results  MRI Brain Without Contrast    Result Date: 9/18/2023  INDICATION: Neuro deficit, acute, stroke suspected. COMPARISON: None relevant. TECHNIQUE: Multisequence, multiplanar MRI of the brain was performed without intravenous contrast. FINDINGS: No acute infarction, hemorrhage, extra-axial fluid  collection, hydrocephalus, or mass.  Mild/moderate supratentorial white matter changes.  Mild mucosal thickening right maxillary sinus.     Impression: No acute intracranial abnormality. Mild/moderate white matter changes.  Differentials include microangiopathy and sequela of migraine headaches       Other Results      Medications reviewed.      Assessment/Plan  Patient presents to the emergency department with back pain that began 4 days PTA.  she also c/o radicular pain to her LLE and LLE weakness, gait instability. I was consulted by phone initially spoke With Dr Mckeon at [facility]. Recommended out patient neuro referral. However, I was informed that the patient also has significant psychiatric history, and currently she is suicidal, she will be admitted for psych eval, and an inpatient neuro consult is placed.     This morning at the bedside, neuro assessment is notable for LLE asymmetric hyperreflexia. I have personally reviewed, her MRI brain, which showed no evidence of CVA, lumbar spine MRI is unremarkable. Will expand MRI to cervical and thoracic spine. Neuro will follow up the neuroimaging with update.           This was a total of 70 minutes patient care encounter. Greater than 50 percent of the time was spent in chart and neuroimaging reviewing, gathering clinical data for decision making, counseling, coordinating care, and EHR documentation.      This note was transcribed using voice recognition software. Though the transcription was reviewed for content and errors, it is possible some were missed. Please notify this clinic if suspected errors are noted in content, spelling, or other possible errors.      Wojciech Monteiro MD

## 2023-09-18 NOTE — PROGRESS NOTES
[facility] Medicine Services  INPATIENT PROGRESS NOTE    Length of Stay: 0  Date of Admission: 9/17/2023  Primary Care Physician: Ninfa Bhatti APRN    Subjective   Chief Complaint: bilateral leg weakness  HPI:  History of Present Illness  Patient with past medical history of COPD, GERD, hypertension, arthritis presents with left lower extremity weakness.  Patient states that lower extremity weakness began 3 days ago.  Patient admits to history of substance abuse, and was reportedly positive for methamphetamines 3 days ago. No loose of bowel or urine, no fever, no urinary symptoms or GI symptoms.  CT abdomen/pelvis shows no constipation, or acute abdominal signs.      Past Medical History:   Diagnosis Date    Allergic     Arthritis     COPD (chronic obstructive pulmonary disease)     GERD (gastroesophageal reflux disease)     Hypertension     Infectious viral hepatitis     Low back pain       Past Surgical History:   Procedure Laterality Date    CHOLECYSTECTOMY      COLONOSCOPY N/A 1/21/2020    Procedure: COLONOSCOPY;  Surgeon: Joshua Perry MD;  Location: [facility] ENDOSCOPY;  Service: General    HYSTERECTOMY        As of today, 09/18/23  Review of Systems   Constitutional:  Negative for activity change, appetite change, chills, diaphoresis and fatigue.   HENT:  Negative for congestion, ear discharge, hearing loss, rhinorrhea, sinus pain, sneezing and sore throat.    Eyes:  Negative for photophobia, discharge and visual disturbance.   Respiratory:  Negative for cough, chest tightness, shortness of breath and wheezing.    Cardiovascular:  Negative for chest pain and palpitations.   Gastrointestinal:  Negative for abdominal distention, abdominal pain, blood in stool, diarrhea, nausea and vomiting.   Endocrine: Negative for cold intolerance, heat intolerance, polydipsia, polyphagia and polyuria.   Genitourinary:  Negative for dysuria, flank pain, hematuria  and urgency.   Musculoskeletal:  Negative for arthralgias, joint swelling and myalgias.   Skin:  Negative for color change.   Allergic/Immunologic: Negative for food allergies.   Neurological:  Negative for dizziness, seizures, syncope, speech difficulty, weakness and headaches.   Hematological:  Negative for adenopathy. Does not bruise/bleed easily.   Psychiatric/Behavioral:  Negative for confusion, hallucinations, self-injury and suicidal ideas. The patient is not nervous/anxious.       All pertinent negatives and positives are as above. All other systems have been reviewed and are negative unless otherwise stated.    Objective    Temp:  [98 °F (36.7 °C)-98.6 °F (37 °C)] 98.2 °F (36.8 °C)  Heart Rate:  [] 78  Resp:  [16-20] 16  BP: (130-217)/() 170/100    AM-PAC 6 Clicks Score (PT): 18 (09/18/23 0020)    As of today, 09/18/23  Physical Exam  Constitutional:       Appearance: Normal appearance.   HENT:      Head: Normocephalic and atraumatic.      Right Ear: Tympanic membrane normal.      Left Ear: Tympanic membrane normal.      Nose: Nose normal.      Mouth/Throat:      Mouth: Mucous membranes are moist.   Eyes:      Pupils: Pupils are equal, round, and reactive to light.   Cardiovascular:      Rate and Rhythm: Normal rate and regular rhythm.      Pulses: Normal pulses.      Heart sounds: Normal heart sounds.   Pulmonary:      Effort: Pulmonary effort is normal.      Breath sounds: Normal breath sounds.   Abdominal:      General: Abdomen is flat. Bowel sounds are normal.      Palpations: Abdomen is soft.   Musculoskeletal:         General: Normal range of motion.      Cervical back: Normal range of motion and neck supple.   Skin:     General: Skin is warm and dry.      Capillary Refill: Capillary refill takes less than 2 seconds.   Neurological:      General: No focal deficit present.      Mental Status: She is alert and oriented to person, place, and time.   Psychiatric:         Mood and Affect: Mood  normal.         Behavior: Behavior normal.         Thought Content: Thought content normal.         Judgment: Judgment normal.         Results Review:  I have reviewed the labs, radiology results, and diagnostic studies.    Laboratory Data:   Results from last 7 days   Lab Units 09/17/23  1843 09/13/23  1328   SODIUM mmol/L 139 140   POTASSIUM mmol/L 4.2 4.3   CHLORIDE mmol/L 103 102   CO2 mmol/L 26.0 27.0   BUN mg/dL 22* 15   CREATININE mg/dL 1.05* 1.09*   GLUCOSE mg/dL 108* 199*   CALCIUM mg/dL 9.0 9.8   BILIRUBIN mg/dL 0.5 0.7   ALK PHOS U/L 129* 140*   ALT (SGPT) U/L 41* 54*   AST (SGOT) U/L 37* 45*   ANION GAP mmol/L 10.0 11.0     Estimated Creatinine Clearance: 51.9 mL/min (A) (by C-G formula based on SCr of 1.05 mg/dL (H)).  Results from last 7 days   Lab Units 09/17/23  1843 09/13/23  1328   MAGNESIUM mg/dL 2.0 1.9         Results from last 7 days   Lab Units 09/18/23  0831 09/17/23  1843 09/13/23  1328   WBC 10*3/mm3 6.91 8.17 6.33   HEMOGLOBIN g/dL 15.0 16.4* 17.5*   HEMATOCRIT % 43.0 47.4* 52.0*   PLATELETS 10*3/mm3 195 236 231           Culture Data:   No results found for: BLOODCX  No results found for: URINECX  No results found for: RESPCX  No results found for: WOUNDCX  No results found for: STOOLCX  No components found for: BODYFLD    Radiology Data:   Imaging Results (Last 24 Hours)       Procedure Component Value Units Date/Time    US Arterial Doppler Lower Extremity Complete [[id number]] Collected: 09/18/23 0811     Updated: 09/18/23 0815    Narrative:      Indication:  Bilateral lower extremity weakness    TECHNIQUE:  High-resolution gray scale imaging, color flow Doppler, spectral waveform  analysis and ankle/brachial indices were obtained for the bilateral lower  extremity.    Comparison:  None    FINDINGS:  There is normal multiphasic flow identified throughout the arterial vasculature  of the bilateral lower extremity.  Ankle/brachial indices were not obtained.  No  hemodynamically significant  stenosis is noted.      Impression:      Normal arterial duplex ultrasound of the bilateral lower extremity based on  normal arterial waveforms.        CT Abdomen Pelvis Without Contrast [[id number]] Collected: 09/17/23 2057     Updated: 09/17/23 2102    Narrative:      CT ABDOMEN PELVIS WO CONTRAST    HISTORY: Abdominal pain, acute, nonlocalized    COMPARISON: CT abdomen pelvis 9/13/2023    Automated exposure control was also utilized to decrease patient radiation dose.    TECHNIQUE: Images of the abdomen and pelvis were obtained.  No IV contrast was  ministered.  Multiplanar reformatted images were provided for review.      FINDINGS:      Mild subsegmental atelectasis in the middle lobe.    Visualized mediastinal structures are normal.    Osseous structures are age-appropriate.    Liver is normal.  Spleen is unremarkable.  Bilateral kidneys are within normal limits.  No renal calculus or obstructive  uropathy.  Bilateral adrenal glands are normal.  Pancreas is normal.  Cholecystectomy.    No bowel obstruction.    The rectum is normal.  Bladder is normal.    No free air or free fluid.    No lymphadenopathy.          Impression:      1.  No acute inflammatory process throughout the abdomen or pelvis.        CT Head Without Contrast [[id number]] Collected: 09/17/23 1859     Updated: 09/17/23 1902    Narrative:      HEAD CT WITHOUT IV CONTRAST    HISTORY:  Unable to feel legs.    COMPARISON:  None.    TECHNIQUE:  Routine helical imaging through the brain with axial, coronal and sagittal  two-dimensional reformatted images.    FINDINGS:  There is no evidence of acute intracranial hemorrhage, mass effect or midline  shift. No abnormal extra-axial fluid collection. No areas of abnormal  attenuation within the brain parenchyma to suggest an acute infarction. The  ventricles, cisterns, and sulci are within normal limits in size and  configuration. The visualized skull base structures and paranasal sinuses are  unremarkable.  No calvarial fracture or other lesion.      Impression:      No acute intracranial process.            I have utilized all available immediate resources to obtain, update, or review the patient's current medications (including all prescriptions, over-the-counter products, herbals, cannabis/cannabidiol products, and vitamin/mineral/dietary (nutritional) supplements).       Assessment/Plan     Active Hospital Problems    Diagnosis     **Lower extremity weakness        Plan:  57 years old female patient with chronic back pain, takes care of herself at home with some mobility problems, complaining of lower back pain and leg weakness, pending MRI results, arterial US legs normal.  Left lower extremity weakness x3 days:  - Given severity of symptoms, need to rule out nerve root in compression and/or CVA.  Pending MRI brain, and MRI lumbar spine.  PT OT evaluation during hospitalization.  As needed pain meds.       Suicidal ideation:  - Patient admitted to both Dr. Mckeon emergency room physician and admitting hospitalist Dr. Linares that she suffers from suicidal ideation.  We will therefore place patient on one-to-one sitter, and order psych consultation in a.m. Denies any plan to me, will wait for psych consult.     Polysubstance abuse:  - Patient positive for methamphetamines 9/13/2023.  Patient's urine drug screen negative for methamphetamines 9/17.  Recommend patient stop using recreational drugs.       Hypertension: Continue home medications    Medical Decision Making  Number and Complexity of problems: 3 major  Differential Diagnosis: radiculopathy    Conditions and Status:        Condition is unchanged.     [facility] Data  External documents reviewed: previous medical records  My EKG interpretation: sinus rhythm by DR Mckeon  My CT interpretation: no acute findings  Tests considered but not ordered: none     Decision rules/scores evaluated (example [id number], Wells, etc): n/a     Discussed with: the patient     Treatment  Plan  See above  Pending MRI, psych consult, neurology consult    Care Planning  Shared decision making: with the patient  Code status and discussions: full code    Disposition  Social Determinants of Health that impact treatment or disposition: none  I expect the patient to be discharged to home in 3 days.       I confirmed that the patient's Advance Care Plan is present, code status is documented, or surrogate decision maker is listed in the patient's medical record.      This document has been electronically signed by Edis Valero MD on September 18, 2023 08:53 CDT

## 2023-09-18 NOTE — ED NOTES
"Pt states at this time \"when I was sent home from the hospital and couldn't do anything for myself could only lay in my bed, I had thoughts of killing myself and I have never had those feelings before now. I feel like if I get sent home again and still can't move or do anything for myself, I might do it.\" MD made aware.  "

## 2023-09-19 ENCOUNTER — APPOINTMENT (OUTPATIENT)
Dept: GENERAL RADIOLOGY | Facility: HOSPITAL | Age: 58
DRG: 948 | End: 2023-09-19
Payer: MEDICARE

## 2023-09-19 ENCOUNTER — APPOINTMENT (OUTPATIENT)
Dept: MRI IMAGING | Facility: HOSPITAL | Age: 58
DRG: 948 | End: 2023-09-19
Payer: MEDICARE

## 2023-09-19 PROBLEM — R82.5 POSITIVE URINE DRUG SCREEN: Status: ACTIVE | Noted: 2023-09-19

## 2023-09-19 PROBLEM — F32.A DEPRESSION: Status: ACTIVE | Noted: 2023-09-19

## 2023-09-19 PROBLEM — F43.21 ADJUSTMENT DISORDER WITH DEPRESSED MOOD: Status: ACTIVE | Noted: 2023-09-19

## 2023-09-19 PROBLEM — F12.10 CANNABIS ABUSE: Status: ACTIVE | Noted: 2023-09-19

## 2023-09-19 LAB
25(OH)D3 SERPL-MCNC: 13.3 NG/ML (ref 30–100)
ANION GAP SERPL CALCULATED.3IONS-SCNC: 10 MMOL/L (ref 5–15)
APPEARANCE CSF: CLEAR
BASOPHILS # BLD AUTO: 0.06 10*3/MM3 (ref 0–0.2)
BASOPHILS NFR BLD AUTO: 1 % (ref 0–1.5)
BUN SERPL-MCNC: 22 MG/DL (ref 6–20)
BUN/CREAT SERPL: 25 (ref 7–25)
C GATTII+NEOFOR DNA CSF QL NAA+NON-PROBE: NOT DETECTED
CALCIUM SPEC-SCNC: 8.8 MG/DL (ref 8.6–10.5)
CHLORIDE SERPL-SCNC: 103 MMOL/L (ref 98–107)
CMV DNA CSF QL NAA+PROBE: NOT DETECTED
CO2 SERPL-SCNC: 25 MMOL/L (ref 22–29)
COLOR CSF: COLORLESS
CREAT SERPL-MCNC: 0.88 MG/DL (ref 0.57–1)
DEPRECATED RDW RBC AUTO: 39.1 FL (ref 37–54)
E COLI K1 DNA CSF QL NAA+NON-PROBE: NOT DETECTED
EGFRCR SERPLBLD CKD-EPI 2021: 75.8 ML/MIN/1.73
EOSINOPHIL # BLD AUTO: 0.22 10*3/MM3 (ref 0–0.4)
EOSINOPHIL NFR BLD AUTO: 3.8 % (ref 0.3–6.2)
ERYTHROCYTE [DISTWIDTH] IN BLOOD BY AUTOMATED COUNT: 12.2 % (ref 12.3–15.4)
EV RNA CSF QL NAA+PROBE: NOT DETECTED
FOLATE SERPL-MCNC: 10.1 NG/ML (ref 4.78–24.2)
GLUCOSE CSF-MCNC: 76 MG/DL (ref 40–70)
GLUCOSE SERPL-MCNC: 107 MG/DL (ref 65–99)
GP B STREP DNA SPEC QL NAA+PROBE: NOT DETECTED
HAEM INFLU SEROTYP DNA SPEC NAA+PROBE: NOT DETECTED
HCT VFR BLD AUTO: 43.5 % (ref 34–46.6)
HGB BLD-MCNC: 14.7 G/DL (ref 12–15.9)
HHV6 DNA CSF QL NAA+PROBE: NOT DETECTED
HOLD SPECIMEN: NORMAL
HSV1 DNA CSF QL NAA+PROBE: NOT DETECTED
HSV2 DNA CSF QL NAA+PROBE: NOT DETECTED
IMM GRANULOCYTES # BLD AUTO: 0.01 10*3/MM3 (ref 0–0.05)
IMM GRANULOCYTES NFR BLD AUTO: 0.2 % (ref 0–0.5)
L MONOCYTOG RRNA SPEC QL PROBE: NOT DETECTED
LYMPHOCYTES # BLD AUTO: 1.94 10*3/MM3 (ref 0.7–3.1)
LYMPHOCYTES NFR BLD AUTO: 33.6 % (ref 19.6–45.3)
MAGNESIUM SERPL-MCNC: 1.9 MG/DL (ref 1.6–2.6)
MCH RBC QN AUTO: 29.7 PG (ref 26.6–33)
MCHC RBC AUTO-ENTMCNC: 33.8 G/DL (ref 31.5–35.7)
MCV RBC AUTO: 87.9 FL (ref 79–97)
MONOCYTES # BLD AUTO: 0.39 10*3/MM3 (ref 0.1–0.9)
MONOCYTES NFR BLD AUTO: 6.7 % (ref 5–12)
N MEN DNA SPEC QL NAA+PROBE: NOT DETECTED
NEUTROPHILS NFR BLD AUTO: 3.16 10*3/MM3 (ref 1.7–7)
NEUTROPHILS NFR BLD AUTO: 54.7 % (ref 42.7–76)
NRBC BLD AUTO-RTO: 0 /100 WBC (ref 0–0.2)
PARECHOVIRUS A RNA CSF QL NAA+NON-PROBE: NOT DETECTED
PLATELET # BLD AUTO: 195 10*3/MM3 (ref 140–450)
PMV BLD AUTO: 10.5 FL (ref 6–12)
POTASSIUM SERPL-SCNC: 4 MMOL/L (ref 3.5–5.2)
RBC # BLD AUTO: 4.95 10*6/MM3 (ref 3.77–5.28)
RBC # CSF MANUAL: 0 /MM3 (ref 0–0)
S PNEUM DNA CSF QL NAA+NON-PROBE: NOT DETECTED
SODIUM SERPL-SCNC: 138 MMOL/L (ref 136–145)
SPECIMEN VOL CSF: 8.5 ML
TUBE # CSF: 1
VIT B12 BLD-MCNC: 354 PG/ML (ref 211–946)
VZV DNA CSF QL NAA+PROBE: NOT DETECTED
WBC # CSF MANUAL: 2 /MM3 (ref 0–5)
WBC NRBC COR # BLD: 5.78 10*3/MM3 (ref 3.4–10.8)

## 2023-09-19 PROCEDURE — 25010000002 METOCLOPRAMIDE PER 10 MG

## 2023-09-19 PROCEDURE — A9270 NON-COVERED ITEM OR SERVICE: HCPCS

## 2023-09-19 PROCEDURE — 63710000001 NICOTINE 21 MG/24HR PATCH 24 HOUR: Performed by: INTERNAL MEDICINE

## 2023-09-19 PROCEDURE — 82525 ASSAY OF COPPER: CPT | Performed by: NURSE PRACTITIONER

## 2023-09-19 PROCEDURE — 82746 ASSAY OF FOLIC ACID SERUM: CPT | Performed by: NURSE PRACTITIONER

## 2023-09-19 PROCEDURE — 25010000002 GADOTERIDOL PER 1 ML: Performed by: INTERNAL MEDICINE

## 2023-09-19 PROCEDURE — 99233 SBSQ HOSP IP/OBS HIGH 50: CPT | Performed by: NURSE PRACTITIONER

## 2023-09-19 PROCEDURE — 72156 MRI NECK SPINE W/O & W/DYE: CPT

## 2023-09-19 PROCEDURE — A9270 NON-COVERED ITEM OR SERVICE: HCPCS | Performed by: INTERNAL MEDICINE

## 2023-09-19 PROCEDURE — 82784 ASSAY IGA/IGD/IGG/IGM EACH: CPT | Performed by: NURSE PRACTITIONER

## 2023-09-19 PROCEDURE — A9579 GAD-BASE MR CONTRAST NOS,1ML: HCPCS | Performed by: INTERNAL MEDICINE

## 2023-09-19 PROCEDURE — 89050 BODY FLUID CELL COUNT: CPT | Performed by: NURSE PRACTITIONER

## 2023-09-19 PROCEDURE — 83916 OLIGOCLONAL BANDS: CPT | Performed by: NURSE PRACTITIONER

## 2023-09-19 PROCEDURE — 82040 ASSAY OF SERUM ALBUMIN: CPT | Performed by: NURSE PRACTITIONER

## 2023-09-19 PROCEDURE — 87798 DETECT AGENT NOS DNA AMP: CPT | Performed by: NURSE PRACTITIONER

## 2023-09-19 PROCEDURE — 63710000001 LISINOPRIL 10 MG TABLET: Performed by: INTERNAL MEDICINE

## 2023-09-19 PROCEDURE — 63710000001

## 2023-09-19 PROCEDURE — 82042 OTHER SOURCE ALBUMIN QUAN EA: CPT | Performed by: NURSE PRACTITIONER

## 2023-09-19 PROCEDURE — 25010000002 MORPHINE PER 10 MG: Performed by: INTERNAL MEDICINE

## 2023-09-19 PROCEDURE — 85025 COMPLETE CBC W/AUTO DIFF WBC: CPT

## 2023-09-19 PROCEDURE — G0378 HOSPITAL OBSERVATION PER HR: HCPCS

## 2023-09-19 PROCEDURE — 87483 CNS DNA AMP PROBE TYPE 12-25: CPT | Performed by: NURSE PRACTITIONER

## 2023-09-19 PROCEDURE — 83735 ASSAY OF MAGNESIUM: CPT | Performed by: INTERNAL MEDICINE

## 2023-09-19 PROCEDURE — 82945 GLUCOSE OTHER FLUID: CPT | Performed by: NURSE PRACTITIONER

## 2023-09-19 PROCEDURE — 82607 VITAMIN B-12: CPT | Performed by: NURSE PRACTITIONER

## 2023-09-19 PROCEDURE — 86592 SYPHILIS TEST NON-TREP QUAL: CPT | Performed by: NURSE PRACTITIONER

## 2023-09-19 PROCEDURE — 99214 OFFICE O/P EST MOD 30 MIN: CPT | Performed by: PSYCHIATRY & NEUROLOGY

## 2023-09-19 PROCEDURE — 84446 ASSAY OF VITAMIN E: CPT | Performed by: NURSE PRACTITIONER

## 2023-09-19 PROCEDURE — 72157 MRI CHEST SPINE W/O & W/DYE: CPT

## 2023-09-19 PROCEDURE — 80048 BASIC METABOLIC PNL TOTAL CA: CPT

## 2023-09-19 PROCEDURE — 82306 VITAMIN D 25 HYDROXY: CPT | Performed by: NURSE PRACTITIONER

## 2023-09-19 PROCEDURE — 25010000002 ENOXAPARIN PER 10 MG: Performed by: INTERNAL MEDICINE

## 2023-09-19 RX ORDER — LACTULOSE 10 G/15ML
300 SOLUTION ORAL ONCE
Status: COMPLETED | OUTPATIENT
Start: 2023-09-19 | End: 2023-09-19

## 2023-09-19 RX ADMIN — ACETAMINOPHEN 650 MG: 650 SOLUTION ORAL at 14:01

## 2023-09-19 RX ADMIN — METOCLOPRAMIDE HYDROCHLORIDE 10 MG: 5 INJECTION INTRAMUSCULAR; INTRAVENOUS at 08:31

## 2023-09-19 RX ADMIN — LISINOPRIL 10 MG: 10 TABLET ORAL at 08:31

## 2023-09-19 RX ADMIN — METOCLOPRAMIDE HYDROCHLORIDE 10 MG: 5 INJECTION INTRAMUSCULAR; INTRAVENOUS at 01:34

## 2023-09-19 RX ADMIN — LISINOPRIL 10 MG: 10 TABLET ORAL at 20:29

## 2023-09-19 RX ADMIN — MORPHINE SULFATE 2 MG: 2 INJECTION, SOLUTION INTRAMUSCULAR; INTRAVENOUS at 13:08

## 2023-09-19 RX ADMIN — MORPHINE SULFATE 2 MG: 2 INJECTION, SOLUTION INTRAMUSCULAR; INTRAVENOUS at 08:38

## 2023-09-19 RX ADMIN — DOCUSATE SODIUM 50 MG AND SENNOSIDES 8.6 MG 2 TABLET: 8.6; 5 TABLET, FILM COATED ORAL at 08:31

## 2023-09-19 RX ADMIN — Medication 10 ML: at 08:32

## 2023-09-19 RX ADMIN — LACTULOSE 300 ML: 10 SOLUTION ORAL at 16:00

## 2023-09-19 RX ADMIN — MORPHINE SULFATE 2 MG: 2 INJECTION, SOLUTION INTRAMUSCULAR; INTRAVENOUS at 20:53

## 2023-09-19 RX ADMIN — NICOTINE 1 PATCH: 21 PATCH, EXTENDED RELEASE TRANSDERMAL at 01:34

## 2023-09-19 RX ADMIN — METOCLOPRAMIDE HYDROCHLORIDE 10 MG: 5 INJECTION INTRAMUSCULAR; INTRAVENOUS at 20:29

## 2023-09-19 RX ADMIN — GADOTERIDOL 17 ML: 279.3 INJECTION, SOLUTION INTRAVENOUS at 10:29

## 2023-09-19 RX ADMIN — Medication 10 ML: at 20:30

## 2023-09-19 RX ADMIN — POLYETHYLENE GLYCOL 3350 17 G: 17 POWDER, FOR SOLUTION ORAL at 08:30

## 2023-09-19 RX ADMIN — ENOXAPARIN SODIUM 40 MG: 40 INJECTION SUBCUTANEOUS at 08:31

## 2023-09-19 RX ADMIN — NICOTINE 1 PATCH: 21 PATCH, EXTENDED RELEASE TRANSDERMAL at 21:49

## 2023-09-19 RX ADMIN — METOCLOPRAMIDE HYDROCHLORIDE 10 MG: 5 INJECTION INTRAMUSCULAR; INTRAVENOUS at 14:00

## 2023-09-19 RX ADMIN — DULOXETINE HYDROCHLORIDE 20 MG: 20 CAPSULE, DELAYED RELEASE ORAL at 08:31

## 2023-09-19 NOTE — PROGRESS NOTES
[facility] Medicine Services  INPATIENT PROGRESS NOTE    Length of Stay: 0  Date of Admission: 9/17/2023  Primary Care Physician: Ninfa Bhatti APRN    Subjective   Chief Complaint: leg weakness  HPI:   History of Present Illness  Patient with past medical history of COPD, GERD, hypertension, arthritis presents with left lower extremity weakness.  Patient states that lower extremity weakness began 3 days ago.  Patient admits to history of substance abuse, and was reportedly positive for methamphetamines at admission. No loose of bowel or urine, no fever, no urinary symptoms or GI symptoms.  CT abdomen/pelvis shows no constipation, or acute abdominal signs.    As of today, 09/19/23  Review of Systems   Constitutional:  Negative for activity change, appetite change, chills, diaphoresis and fatigue.   HENT:  Negative for congestion, ear discharge, hearing loss, rhinorrhea, sinus pain, sneezing and sore throat.    Eyes:  Negative for photophobia, discharge and visual disturbance.   Respiratory:  Negative for cough, chest tightness, shortness of breath and wheezing.    Cardiovascular:  Negative for chest pain and palpitations.   Gastrointestinal:  Negative for abdominal distention, abdominal pain, blood in stool, diarrhea, nausea and vomiting.   Endocrine: Negative for cold intolerance, heat intolerance, polydipsia, polyphagia and polyuria.   Genitourinary:  Negative for dysuria, flank pain, hematuria and urgency.   Musculoskeletal:  Negative for arthralgias, joint swelling and myalgias.   Skin:  Negative for color change.   Allergic/Immunologic: Negative for food allergies.   Neurological:  Negative for dizziness, seizures, syncope, speech difficulty, weakness and headaches.   Hematological:  Negative for adenopathy. Does not bruise/bleed easily.   Psychiatric/Behavioral:  Negative for confusion, hallucinations, self-injury and suicidal ideas. The patient is not  nervous/anxious.       All pertinent negatives and positives are as above. All other systems have been reviewed and are negative unless otherwise stated.    Objective    Temp:  [97.6 °F (36.4 °C)-98 °F (36.7 °C)] 97.7 °F (36.5 °C)  Heart Rate:  [60-81] 70  Resp:  [18] 18  BP: (122-186)/(63-90) 158/90    AM-PAC 6 Clicks Score (PT): 18 (09/18/23 0830)    As of today, 09/19/23  Physical Exam  Constitutional:       Appearance: Normal appearance.   HENT:      Head: Normocephalic and atraumatic.      Right Ear: Tympanic membrane normal.      Left Ear: Tympanic membrane normal.      Nose: Nose normal.      Mouth/Throat:      Mouth: Mucous membranes are moist.   Eyes:      Pupils: Pupils are equal, round, and reactive to light.   Cardiovascular:      Rate and Rhythm: Normal rate and regular rhythm.      Pulses: Normal pulses.      Heart sounds: Normal heart sounds.   Pulmonary:      Effort: Pulmonary effort is normal.      Breath sounds: Normal breath sounds.   Abdominal:      General: Abdomen is flat. Bowel sounds are normal.      Palpations: Abdomen is soft.   Musculoskeletal:         General: Normal range of motion.      Cervical back: Normal range of motion and neck supple.   Skin:     General: Skin is warm and dry.      Capillary Refill: Capillary refill takes less than 2 seconds.   Neurological:      General: No focal deficit present.      Mental Status: She is alert and oriented to person, place, and time.   Psychiatric:         Mood and Affect: Mood normal.         Behavior: Behavior normal.         Thought Content: Thought content normal.         Judgment: Judgment normal.         Results Review:  I have reviewed the labs, radiology results, and diagnostic studies.    Laboratory Data:   Results from last 7 days   Lab Units 09/19/23  0556 09/17/23  1843 09/13/23  1328   SODIUM mmol/L 138 139 140   POTASSIUM mmol/L 4.0 4.2 4.3   CHLORIDE mmol/L 103 103 102   CO2 mmol/L 25.0 26.0 27.0   BUN mg/dL 22* 22* 15    CREATININE mg/dL 0.88 1.05* 1.09*   GLUCOSE mg/dL 107* 108* 199*   CALCIUM mg/dL 8.8 9.0 9.8   BILIRUBIN mg/dL  --  0.5 0.7   ALK PHOS U/L  --  129* 140*   ALT (SGPT) U/L  --  41* 54*   AST (SGOT) U/L  --  37* 45*   ANION GAP mmol/L 10.0 10.0 11.0     Estimated Creatinine Clearance: 61.5 mL/min (by C-G formula based on SCr of 0.88 mg/dL).  Results from last 7 days   Lab Units 09/19/23  0556 09/17/23  1843 09/13/23  1328   MAGNESIUM mg/dL 1.9 2.0 1.9         Results from last 7 days   Lab Units 09/19/23  0556 09/18/23  0831 09/17/23  1843 09/13/23  1328   WBC 10*3/mm3 5.78 6.91 8.17 6.33   HEMOGLOBIN g/dL 14.7 15.0 16.4* 17.5*   HEMATOCRIT % 43.5 43.0 47.4* 52.0*   PLATELETS 10*3/mm3 195 195 236 231           Culture Data:   No results found for: BLOODCX  No results found for: URINECX  No results found for: RESPCX  No results found for: WOUNDCX  No results found for: STOOLCX  No components found for: BODYFLD    Radiology Data:   Imaging Results (Last 24 Hours)       Procedure Component Value Units Date/Time    IR LUMBAR PUNCTURE DIAGNOSTIC [[id number]] Resulted: 09/19/23 1324     Updated: 09/19/23 1354    MRI Cervical Spine With & Without Contrast [[id number]] Collected: 09/19/23 1111     Updated: 09/19/23 1118    Narrative:      History: Neck pain, chronicLLE hyperreflexia and weakness    COMPARISON: None    TECHNIQUE: Multiplanar, multisequence images were obtained of the cervical  spine. IV contrast was administered    FINDINGS:      Normal alignment of the cervical vertebra.  Bone marrow signal is age-appropriate.  There is no fracture.    Cervical spinal cord is normal in course and caliber, without signal  abnormality.    DEGENERATIVE CHANGES:    C2-C3: No significant spinal stenosis or foraminal narrowing.    C3-C4: No significant spinal stenosis or foraminal narrowing.    C4-C5: Mild bilateral foraminal narrowing secondary to facet and uncovertebral  hypertrophy.    C5-C6: Mild bilateral foraminal narrowing  secondary to facet and uncovertebral  hypertrophy.    C6-C7: No significant spinal stenosis or foraminal narrowing.    C7-T1: No significant spinal stenosis or foraminal narrowing.      Impression:      1.At the C4-5 and C5-6 levels there is mild bilateral foraminal narrowing  secondary to facet and uncovertebral hypertrophy.    MRI Thoracic Spine With & Without Contrast [[id number]] Collected: 09/19/23 1102     Updated: 09/19/23 1115    Narrative:      History: Ataxia, nontraumatic, thoracic pathology suspectedLLE hyperreflexia and  weakness    COMPARISON: None    TECHNIQUE: Multiplanar, multisequence images were obtained of the thoracic  spine.    FINDINGS:      Normal alignment of the thoracic vertebra.  Bone marrow signal is age-appropriate.  There is no fracture.    Thoracic spinal cord is normal course and caliber.  There is signal abnormality  within the dorsal column of the spinal cord extending from the T4 level through  the T8 level.  No abnormal enhancement.    Mild multilevel degenerative disc disease.  No significant disc herniation,  spinal stenosis or foraminal narrowing.          Impression:      1.Abnormal T2 hyperintensity within the dorsal column of the thoracic spinal  cord from the T4 level through the T8 level.  No abnormal enhancement.  2.  These findings are nonspecific.  This may represent a demyelinating process.  An infectious/viral myelitis is also consideration.  This is not have the  typical appearance of neoplasm which is considered unlikely.  Possibility of  cord infarct is also a consideration.  3.  Mild multilevel degenerative disc disease.  No significant disc herniation,  spinal stenosis or foraminal narrowing.      MRI Brain Without Contrast [[id number]] Collected: 09/18/23 1134     Updated: 09/18/23 1607    Narrative:      INDICATION:  Neuro deficit, acute, stroke suspected.    COMPARISON:  None relevant.    TECHNIQUE:  Multisequence, multiplanar MRI of the brain was performed  without intravenous  contrast.    FINDINGS:  No acute infarction, hemorrhage, extra-axial fluid collection, hydrocephalus, or  mass.  Mild/moderate supratentorial white matter changes.  Mild mucosal  thickening right maxillary sinus.      Impression:      No acute intracranial abnormality.    Mild/moderate white matter changes.  Differentials include microangiopathy and  sequela of migraine headaches          XR Abdomen 2+ VW with Chest 1 VW [[id number]] Collected: 09/18/23 1533     Updated: 09/18/23 1556    Narrative:      INDICATION:  rule out obstruction.    COMPARISON:  None relevant.    FINDINGS:  Cardiac size is not enlarged.  No pleural effusion or pneumothorax.  No dense  airspace consolidation. Visualized osseous structures are intact.    Nonobstructive bowel gas pattern.  Moderate to large volume of colonic stool.  No free air.                  I have utilized all available immediate resources to obtain, update, or review the patient's current medications (including all prescriptions, over-the-counter products, herbals, cannabis/cannabidiol products, and vitamin/mineral/dietary (nutritional) supplements).       Assessment/Plan     Active Hospital Problems    Diagnosis     **Lower extremity weakness     Depression     Adjustment disorder with depressed mood     Cannabis abuse     Positive urine drug screen for methamphetamine        Assessment and   Plan:  57 years old female patient with chronic back pain, takes care of herself at home with some mobility problems, complaining of lower back pain and leg weakness, MRI spine showing myelitis at T4 - T8 rule out infectious vs demyelinating process, pending LP, arterial US legs normal.  Left lower extremity weakness:  - Given severity of symptoms, need to rule out nerve root in compression and/or CVA.  MRI spine: Abnormal T2 hyperintensity within the dorsal column of the thoracic spinal  cord from the T4 level through the T8 level.  No abnormal enhancement.   These findings are nonspecific.  This may represent a demyelinating process. An infectious/viral myelitis is also consideration.  This is not have the typical appearance of neoplasm which is considered unlikely.  Possibility of cord infarct is also a consideration. Tele neurology consult today define LP by IR.    Suicidal ideation:  - Followed by DR Good     Polysubstance abuse:  - Patient positive for methamphetamines 9/13/2023.  Patient's urine drug screen negative for methamphetamines 9/17.  Recommend patient stop using recreational drugs.       Hypertension: Continue home medications      Medical Decision Making  Number and Complexity of problems: 3  Differential Diagnosis: transverse myelitis    Conditions and Status:        Condition is unchanged.     MDM Data  External documents reviewed: previous medical records  My EKG interpretation: sinus rhythm by DR Mckeon  My CT interpretation: MRI thoracic spine myelitis  Tests considered but not ordered: none     Decision rules/scores evaluated (example [id number], Wells, etc): n/a     Discussed with: Justo GALINDO neurology, the patient     Treatment Plan  Pending LP and studies ordered by neurology      Care Planning  Shared decision making: with the patient  Code status and discussions: full code    Disposition  Social Determinants of Health that impact treatment or disposition: none  I expect the patient to be discharged to rehab in 5 days.       I confirmed that the patient's Advance Care Plan is present, code status is documented, or surrogate decision maker is listed in the patient's medical record.      This document has been electronically signed by Edis Valero MD on September 19, 2023 13:57 CDT

## 2023-09-19 NOTE — PROGRESS NOTES
ATTENDING ADDENDUM:   I have reviewed and discussed the following case and agree with the assessment and plan as documented above. The following diagnoses were discussed and assessed:    Encounter for routine child health examination without abnormal findings  (primary encounter diagnosis)  Need for vaccination      Frida Lopez MD       Stroke Progress Note       Chief Complaint:  Left leg weakness    Subjective     HPI: Pt is a 57-yr-old right-handed white female with known diagnoses of hypertension, Substance abuse, and arthritis who presented on 9/17 with LLE weakness.  She also c/o radicular pain to her LLE and LLE weakness, gait instability.   Neuro assessment was notable for LLE asymmetric hyperreflexia. MRI brain, which showed no evidence of CVA, lumbar spine MRI is unremarkable.   Review of Systems   HENT: Negative.     Eyes: Negative.    Respiratory: Negative.     Cardiovascular: Negative.    Gastrointestinal: Negative.    Genitourinary: Negative.    Musculoskeletal: Negative.    Skin: Negative.    Neurological:  Positive for weakness.        LLE   Psychiatric/Behavioral: Negative.        Objective      Temp:  [97.6 °F (36.4 °C)-98 °F (36.7 °C)] 97.6 °F (36.4 °C)  Heart Rate:  [60-81] 81  Resp:  [18] 18  BP: (122-171)/(63-86) 125/68    Neurological Exam  Mental Status  Alert. Oriented to person, place, time and situation. Speech is normal. Language is fluent with no aphasia.    Cranial Nerves  CN II: Visual acuity is normal. Visual fields full to confrontation.  CN III, IV, VI: Extraocular movements intact bilaterally. Normal lids and orbits bilaterally. Pupils equal round and reactive to light bilaterally.  CN V: Facial sensation is normal.  CN VII: Full and symmetric facial movement.  CN IX, X: Palate elevates symmetrically. Normal gag reflex.  CN XI: Shoulder shrug strength is normal.  CN XII: Tongue midline without atrophy or fasciculations.    Motor  Normal muscle bulk throughout. Left pronator drift. LLE.    Sensory  Light touch abnormality: LLE.     Reflexes                                            Right                      Left  Patellar                                                        4+    Coordination    Finger-to-nose, rapid alternating movements and heel-to-shin normal bilaterally without dysmetria.    Gait    Not  assessed.    Physical Exam  Constitutional:       Appearance: Normal appearance.   HENT:      Head: Normocephalic and atraumatic.   Eyes:      General: Lids are normal.      Extraocular Movements: Extraocular movements intact.      Pupils: Pupils are equal, round, and reactive to light.   Cardiovascular:      Rate and Rhythm: Normal rate.   Pulmonary:      Effort: Pulmonary effort is normal.   Musculoskeletal:         General: Normal range of motion.      Cervical back: Normal range of motion.   Skin:     General: Skin is warm and dry.   Neurological:      Mental Status: She is alert.      Motor: Weakness present.      Coordination: Coordination is intact.      Deep Tendon Reflexes:      Reflex Scores:       Patellar reflexes are 4+ on the left side.  Psychiatric:         Mood and Affect: Mood normal.         Speech: Speech normal.       Results Review:    I reviewed the patient's new clinical results.    Lab Results (last 24 hours)       Procedure Component Value Units Date/Time    Magnesium [[id number]]  (Normal) Collected: 09/19/23 0556    Specimen: Blood Updated: 09/19/23 0701     Magnesium 1.9 mg/dL     Basic Metabolic Panel [[id number]]  (Abnormal) Collected: 09/19/23 0556    Specimen: Blood Updated: 09/19/23 0701     Glucose 107 mg/dL      BUN 22 mg/dL      Creatinine 0.88 mg/dL      Sodium 138 mmol/L      Potassium 4.0 mmol/L      Chloride 103 mmol/L      CO2 25.0 mmol/L      Calcium 8.8 mg/dL      BUN/Creatinine Ratio 25.0     Anion Gap 10.0 mmol/L      eGFR 75.8 mL/min/1.73     Narrative:      GFR Normal >60  Chronic Kidney Disease <60  Kidney Failure <15      CBC & Differential [[id number]]  (Abnormal) Collected: 09/19/23 0556    Specimen: Blood Updated: 09/19/23 0633    Narrative:      The following orders were created for panel order CBC & Differential.  Procedure                               Abnormality         Status                     ---------                               -----------         ------                      CBC Auto Differential[[id number]]        Abnormal            Final result                 Please view results for these tests on the individual orders.    CBC Auto Differential [[id number]]  (Abnormal) Collected: 09/19/23 0556    Specimen: Blood Updated: 09/19/23 0633     WBC 5.78 10*3/mm3      RBC 4.95 10*6/mm3      Hemoglobin 14.7 g/dL      Hematocrit 43.5 %      MCV 87.9 fL      MCH 29.7 pg      MCHC 33.8 g/dL      RDW 12.2 %      RDW-SD 39.1 fl      MPV 10.5 fL      Platelets 195 10*3/mm3      Neutrophil % 54.7 %      Lymphocyte % 33.6 %      Monocyte % 6.7 %      Eosinophil % 3.8 %      Basophil % 1.0 %      Immature Grans % 0.2 %      Neutrophils, Absolute 3.16 10*3/mm3      Lymphocytes, Absolute 1.94 10*3/mm3      Monocytes, Absolute 0.39 10*3/mm3      Eosinophils, Absolute 0.22 10*3/mm3      Basophils, Absolute 0.06 10*3/mm3      Immature Grans, Absolute 0.01 10*3/mm3      nRBC 0.0 /100 WBC           CT Abdomen Pelvis Without Contrast    Result Date: 9/17/2023  1.  No acute inflammatory process throughout the abdomen or pelvis.     CT Head Without Contrast    Result Date: 9/17/2023  No acute intracranial process.    MRI Brain Without Contrast    Result Date: 9/18/2023  No acute intracranial abnormality. Mild/moderate white matter changes.  Differentials include microangiopathy and sequela of migraine headaches     MRI Lumbar Spine Without Contrast    Result Date: 9/18/2023  1.  Changes of spondylosis and facet hypertrophy as detailed above.    MRI Cervical Spine With & Without Contrast    Result Date: 9/19/2023  1.At the C4-5 and C5-6 levels there is mild bilateral foraminal narrowing secondary to facet and uncovertebral hypertrophy.    MRI Thoracic Spine With & Without Contrast    Result Date: 9/19/2023  1.Abnormal T2 hyperintensity within the dorsal column of the thoracic spinal cord from the T4 level through the T8 level.  No abnormal enhancement. 2.  These findings are nonspecific.  This  may represent a demyelinating process. An infectious/viral myelitis is also consideration.  This is not have the typical appearance of neoplasm which is considered unlikely.  Possibility of cord infarct is also a consideration. 3.  Mild multilevel degenerative disc disease.  No significant disc herniation, spinal stenosis or foraminal narrowing.     US Arterial Doppler Lower Extremity Complete    Result Date: 9/18/2023  Normal arterial duplex ultrasound of the bilateral lower extremity based on normal arterial waveforms.          Assessment/Plan     Assessment/Plan: Pt is a 57-yr-old right-handed white female with known diagnoses of hypertension, Substance abuse, and arthritis who presented on 9/17 with LLE weakness.  She also c/o radicular pain to her LLE and LLE weakness, gait instability.   Neuro assessment was notable for LLE asymmetric hyperreflexia. MRI brain, which showed no evidence of CVA, lumbar spine MRI is unremarkable.       MRI of cervical and thoracic spine was completed which shows some signal in the posterior t-spine between T4-T8 which looks chronic but will order a Lumbar Puncture to assess for infection or any other inflammatory process. Procedure was explained to pt and she agreed to proceed. Will also order vit D,E, B21, folate, and copper level.   Case was discussed with pt, Dr. Monteiro, Hospitalist team, and nursing. Will follow up on LP.      Lower extremity weakness          MATEO Armendariz  09/19/23  12:36 CDT        I spent 40 minutes caring for Hanna Escalante  on this date of service. This time includes time spent by me in the following activities: preparing for the visit, reviewing tests, obtaining and/or reviewing a separately obtained history, performing a medically appropriate examination and/or evaluation, counseling and educating the patient/family/caregiver, ordering medications, tests, or procedures, referring and communicating with other health care professionals, documenting  information in the medical record, independently interpreting results and communicating that information with the patient/family/caregiver, and care coordination

## 2023-09-19 NOTE — PLAN OF CARE
Goal Outcome Evaluation:  Plan of Care Reviewed With: patient        Progress: improving  Outcome Evaluation: VSS, c/o pain managed with PRN pain meds per orders per pt request, pt seems very optimistic this shift, pt refused one time dose enema ordered despite attempts of education, pt states she will take it later, precautions maintained, sitter at bedside, no other complaints.

## 2023-09-19 NOTE — SIGNIFICANT NOTE
09/19/23 1238   OTHER   Discipline occupational therapist;physical therapist   Rehab Time/Intention   Session Not Performed patient unavailable for evaluation  (OT/PT evals attempted x2 this date. First attempt pt RUSTY for tests and 2nd attempt pt under precautions to lay flat after lumbar puncture. OT/PT will f/u as able.)   Recommendation   PT - Next Appointment 09/20/23   Recommendation   OT - Next Appointment 09/20/23

## 2023-09-19 NOTE — PRE-PROCEDURE NOTE
Patient's history and physical exam were reviewed, and no changes were noted.  The risks and benefits of lumbar puncture were discussed with the patient, and the patient had ample opportunity to ask questions.  Informed consent was obtained.

## 2023-09-19 NOTE — POST-PROCEDURE NOTE
Post Op Procedure Note          Pre-Op Diagnosis: Lower extremity weakness    Post-Op Diagnosis: Lower extremity weakness    Procedure: lumbar puncture, fluoro guided    Surgeon: diana    Assistant: none    Estimated Blood Loss: none    Complications: none    Specimens: 9 mL clear CSF    Findings: none    Drains: none    Anesthesia: local, 10 mL lidocaine    Saleem Rosario MD,

## 2023-09-19 NOTE — PROGRESS NOTES
Psychiatry Progress Note    9/19/2023    Chief Complaint: suicidal ideation    Subjective:  Patient is a 57 y.o. female who was seen for suicidal ideation.    Patient noted to have myelitis in the T-4 to T-8 region and there is concern for infection and she had a lumbar puncture.    Patient notes a sense of relief that they are likely to find something that will help her.  She notes that she sees light at the end of the tunnel and denies any suicidal thoughts.  She requests to have suicide watch stopped so she can have her phone and call her sister.    Objective     Vital Signs    Vitals:    09/19/23 1055 09/19/23 1323 09/19/23 1353 09/19/23 1421   BP: 125/68 (!) 186/88 158/90 139/78   BP Location: Left arm Right arm Right arm Right arm   Patient Position: Lying Lying Lying Lying   Pulse: 81 76 70 66   Resp: 18   18   Temp: 97.6 °F (36.4 °C) 97.7 °F (36.5 °C)  98.2 °F (36.8 °C)   TempSrc: Temporal Oral  Temporal   SpO2: 93% 97% 95% 97%   Weight:       Height:           Physical Exam: as of today, 09/19/23   General Appearance: alert, appears stated age, and cooperative,  Hygiene:   good  Gait & Station:  in bed  Musculoskeletal: No tremors or abnormal involuntary movements    Mental Status Exam: as of today, 09/19/23   Cooperation:  Cooperative  Eye Contact:  Good  Psychomotor Behavior:  Appropriate  Mood: Improving  Affect:  normal  Speech:  Normal  Thought Process:  Coherent  Associations: Goal Directed  Thought Content:     Normal   Suicidal:  None   Homicidal:  None   Hallucinations:  None   Delusion:  None  Cognitive Functioning:   Consciousness: awake and alert  Reliability:   adequate  Insight:   improving  Judgement:   improving  Impulse Control:   improving    Lab Results: Results source: EMR   Lab Results (last 24 hours)       Procedure Component Value Units Date/Time    Meningitis / Encephalitis Panel, PCR - Cerebrospinal Fluid, Lumbar Puncture [[id number]]  (Normal) Collected: 09/19/23 1357    Specimen:  Cerebrospinal Fluid from Lumbar Puncture Updated: 09/19/23 1525     ESCHERICHIA COLI K1, PCR Not Detected     HAEMOPHILUS INFLUENZAE, PCR Not Detected     LISTERIA MONOCYTOGENES, PCR Not Detected     NEISSERIA MENINGITIDIS, PCR Not Detected     STREPTOCOCCUS AGALACTIAE, PCR Not Detected     STREPTOCOCCUS PNEUMONIAE, PCR Not Detected     CYTOMEGALOVIRUS (CMV), PCR Not Detected     ENTEROVIRUS, PCR Not Detected     HERPES SIMPLEX VIRUS 1 (HSV-1), PCR Not Detected     HERPES SIMPLEX VIRUS 2 (HSV-2), PCR Not Detected     HUMAN PARECHOVIRUS, PCR Not Detected     VARICELLA ZOSTER VIRUS (VZV), PCR Not Detected     CRYPTOCOCCUS NEOFORMANS / GATTII, PCR Not Detected     HUMAN HERPES VIRUS 6 PCR Not Detected    Narrative:      This test is performed by utilizing real time polymerace chain recation (PCR).   The [employer] [facility] Panel does not distinguish between latent and active CMV and HHV-6 infections. Detection of these viruses may indicate primary infection, secondary reactivation, or the presence of latent virus. Results should always be interpreted in conjunction with other clinical, laboratory, and epidemiological information.    Glucose, CSF - Cerebrospinal Fluid, Lumbar Puncture [[id number]]  (Abnormal) Collected: 09/19/23 1357    Specimen: Cerebrospinal Fluid from Lumbar Puncture Updated: 09/19/23 1501     Glucose, CSF 76 mg/dL     Cell Count With Differential, CSF Use CSF Tube: 1 [[id number]] Collected: 09/19/23 1357    Specimen: Cerebrospinal Fluid from Lumbar Puncture Updated: 09/19/23 1439    Narrative:      The following orders were created for panel order Cell Count With Differential, CSF Use CSF Tube: 1.  Procedure                               Abnormality         Status                     ---------                               -----------         ------                     Cell Count, CSF - Cerebr...[[id number]]                      Final result                 Please view results for these tests on the  individual orders.    Cell Count, CSF - Cerebrospinal Fluid, Lumbar Puncture [[id number]] Collected: 09/19/23 1357    Specimen: Cerebrospinal Fluid from Lumbar Puncture Updated: 09/19/23 1439     Color, CSF Colorless     Appearance, CSF Clear     Volume, CSF 8.5 mL      Tube Number, CSF 1     WBC, CSF 2 /mm3      RBC, CSF 0 /mm3     Narrative:      Differential not indicated.    Oligoclonal Banding (COLLECT RED TUBE) [[id number]] Collected: 09/19/23 1257    Specimen: Cerebrospinal Fluid from Lumbar Puncture Updated: 09/19/23 1400    Narrative:      The following orders were created for panel order Oligoclonal Banding (COLLECT RED TUBE).  Procedure                               Abnormality         Status                     ---------                               -----------         ------                     Oligoclonal Banding - Ce...[[id number]]                      In process                 Red Top[[id number]]                                          Final result                 Please view results for these tests on the individual orders.    Red Top [[id number]] Collected: 09/19/23 1257    Specimen: Blood Updated: 09/19/23 1400     Extra Tube Hold for add-ons.     Comment: Auto resulted.       VDRL, CSF - Cerebrospinal Fluid, Lumbar Puncture [[id number]] Collected: 09/19/23 1357    Specimen: Cerebrospinal Fluid from Lumbar Puncture Updated: 09/19/23 1358    IgG Index & Synthesis Rate (Collect RED Top) - Cerebrospinal Fluid, Lumbar Puncture [[id number]] Collected: 09/19/23 1357    Specimen: Cerebrospinal Fluid from Lumbar Puncture Updated: 09/19/23 1358    Jannet Barr Virus PCR CSF - Cerebrospinal Fluid, Lumbar Puncture [[id number]] Collected: 09/19/23 1357    Specimen: Cerebrospinal Fluid from Lumbar Puncture Updated: 09/19/23 1358    Oligoclonal Banding - Cerebrospinal Fluid, Lumbar Puncture [[id number]] Collected: 09/19/23 1357    Specimen: Cerebrospinal Fluid from Lumbar Puncture Updated: 09/19/23 1358    Vitamin  D,25-Hydroxy [[id number]] Collected: 09/19/23 1257    Specimen: Blood Updated: 09/19/23 1303    Vitamin B12 [[id number]] Collected: 09/19/23 1257    Specimen: Blood Updated: 09/19/23 1303    Folate [[id number]] Collected: 09/19/23 1257    Specimen: Blood Updated: 09/19/23 1303    Copper, Serum [[id number]] Collected: 09/19/23 1257    Specimen: Blood Updated: 09/19/23 1303    Vitamin E [[id number]] Collected: 09/19/23 1257    Specimen: Blood Updated: 09/19/23 1303    Magnesium [[id number]]  (Normal) Collected: 09/19/23 0556    Specimen: Blood Updated: 09/19/23 0701     Magnesium 1.9 mg/dL     Basic Metabolic Panel [[id number]]  (Abnormal) Collected: 09/19/23 0556    Specimen: Blood Updated: 09/19/23 0701     Glucose 107 mg/dL      BUN 22 mg/dL      Creatinine 0.88 mg/dL      Sodium 138 mmol/L      Potassium 4.0 mmol/L      Chloride 103 mmol/L      CO2 25.0 mmol/L      Calcium 8.8 mg/dL      BUN/Creatinine Ratio 25.0     Anion Gap 10.0 mmol/L      eGFR 75.8 mL/min/1.73     Narrative:      GFR Normal >60  Chronic Kidney Disease <60  Kidney Failure <15      CBC & Differential [[id number]]  (Abnormal) Collected: 09/19/23 0556    Specimen: Blood Updated: 09/19/23 0633    Narrative:      The following orders were created for panel order CBC & Differential.  Procedure                               Abnormality         Status                     ---------                               -----------         ------                     CBC Auto Differential[[id number]]        Abnormal            Final result                 Please view results for these tests on the individual orders.    CBC Auto Differential [[id number]]  (Abnormal) Collected: 09/19/23 0556    Specimen: Blood Updated: 09/19/23 0633     WBC 5.78 10*3/mm3      RBC 4.95 10*6/mm3      Hemoglobin 14.7 g/dL      Hematocrit 43.5 %      MCV 87.9 fL      MCH 29.7 pg      MCHC 33.8 g/dL      RDW 12.2 %      RDW-SD 39.1 fl      MPV 10.5 fL      Platelets 195 10*3/mm3      Neutrophil  % 54.7 %      Lymphocyte % 33.6 %      Monocyte % 6.7 %      Eosinophil % 3.8 %      Basophil % 1.0 %      Immature Grans % 0.2 %      Neutrophils, Absolute 3.16 10*3/mm3      Lymphocytes, Absolute 1.94 10*3/mm3      Monocytes, Absolute 0.39 10*3/mm3      Eosinophils, Absolute 0.22 10*3/mm3      Basophils, Absolute 0.06 10*3/mm3      Immature Grans, Absolute 0.01 10*3/mm3      nRBC 0.0 /100 WBC             Radiology Results:  Imaging Results (Last 24 Hours)       Procedure Component Value Units Date/Time    IR LUMBAR PUNCTURE DIAGNOSTIC [[id number]] Collected: 09/19/23 1440     Updated: 09/19/23 1447    Narrative:      PROCEDURE: Fluoroscopic guided lumbar puncture    COMPARISON: No comparison    HISTORY: Lower extremity weakness and numbness. Abnormal thoracic spine MRI.    Total fluoroscopy time: 22 seconds  DAP: 86.4 uGy/m^2    TECHNIQUE:  The risks including infection, headaches and bleeding, as well as the benefits  and alternatives were thoroughly explained to the patient. The patient had ample  opportunity to ask questions and agreed to proceed.  Written, informed consent  was obtained.    The patient was placed in the prone position on the fluoroscopy table. A midline  approach was selected at the L4-5 level. The skin was prepped and draped in  sterile fashion. Approximately 10 mL of 1% lidocaine solution was used for local  anesthesia. A 3.5 inch, 20 gauge needle and stylet were advanced into the thecal  sac under direct fluoroscopic guidance.    FINDINGS:  Approximately 9 mL of clear CSF was removed and sent to the laboratory for  further analysis. The patient tolerated the procedure well. No immediate post  procedure complications.      Impression:      1. Successful fluoroscopic guided lumbar, as described above.      MRI Cervical Spine With & Without Contrast [[id number]] Collected: 09/19/23 1111     Updated: 09/19/23 1118    Narrative:      History: Neck pain, chronicLLE hyperreflexia and  weakness    COMPARISON: None    TECHNIQUE: Multiplanar, multisequence images were obtained of the cervical  spine. IV contrast was administered    FINDINGS:      Normal alignment of the cervical vertebra.  Bone marrow signal is age-appropriate.  There is no fracture.    Cervical spinal cord is normal in course and caliber, without signal  abnormality.    DEGENERATIVE CHANGES:    C2-C3: No significant spinal stenosis or foraminal narrowing.    C3-C4: No significant spinal stenosis or foraminal narrowing.    C4-C5: Mild bilateral foraminal narrowing secondary to facet and uncovertebral  hypertrophy.    C5-C6: Mild bilateral foraminal narrowing secondary to facet and uncovertebral  hypertrophy.    C6-C7: No significant spinal stenosis or foraminal narrowing.    C7-T1: No significant spinal stenosis or foraminal narrowing.      Impression:      1.At the C4-5 and C5-6 levels there is mild bilateral foraminal narrowing  secondary to facet and uncovertebral hypertrophy.    MRI Thoracic Spine With & Without Contrast [[id number]] Collected: 09/19/23 1102     Updated: 09/19/23 1115    Narrative:      History: Ataxia, nontraumatic, thoracic pathology suspectedLLE hyperreflexia and  weakness    COMPARISON: None    TECHNIQUE: Multiplanar, multisequence images were obtained of the thoracic  spine.    FINDINGS:      Normal alignment of the thoracic vertebra.  Bone marrow signal is age-appropriate.  There is no fracture.    Thoracic spinal cord is normal course and caliber.  There is signal abnormality  within the dorsal column of the spinal cord extending from the T4 level through  the T8 level.  No abnormal enhancement.    Mild multilevel degenerative disc disease.  No significant disc herniation,  spinal stenosis or foraminal narrowing.          Impression:      1.Abnormal T2 hyperintensity within the dorsal column of the thoracic spinal  cord from the T4 level through the T8 level.  No abnormal enhancement.  2.  These findings  are nonspecific.  This may represent a demyelinating process.  An infectious/viral myelitis is also consideration.  This is not have the  typical appearance of neoplasm which is considered unlikely.  Possibility of  cord infarct is also a consideration.  3.  Mild multilevel degenerative disc disease.  No significant disc herniation,  spinal stenosis or foraminal narrowing.              Medicine:   Current Facility-Administered Medications:     acetaminophen (TYLENOL) tablet 650 mg, 650 mg, Oral, Q4H PRN **OR** acetaminophen (TYLENOL) 160 MG/5ML oral solution 650 mg, 650 mg, Oral, Q4H PRN, 650 mg at 09/19/23 1401 **OR** acetaminophen (TYLENOL) suppository 650 mg, 650 mg, Rectal, Q4H PRN, Collin Linares MD    sennosides-docusate (PERICOLACE) 8.6-50 MG per tablet 2 tablet, 2 tablet, Oral, BID, 2 tablet at 09/19/23 0831 **AND** polyethylene glycol (MIRALAX) packet 17 g, 17 g, Oral, Daily PRN, 17 g at 09/19/23 0830 **AND** bisacodyl (DULCOLAX) EC tablet 5 mg, 5 mg, Oral, Daily PRN, 5 mg at 09/18/23 2108 **AND** bisacodyl (DULCOLAX) suppository 10 mg, 10 mg, Rectal, Daily PRN, Collin Linares MD    calcium carbonate (TUMS) chewable tablet 500 mg (200 mg elemental), 1 tablet, Oral, BID PRN, Collin Linares MD    Calcium Replacement - Follow Nurse / BPA Driven Protocol, , Does not apply, PRN, Collin Linares MD    cyclobenzaprine (FLEXERIL) tablet 10 mg, 10 mg, Oral, TID PRN, Edis Valero MD    DULoxetine (CYMBALTA) DR capsule 20 mg, 20 mg, Oral, Daily, Collin Linares MD, 20 mg at 09/19/23 0831    Enoxaparin Sodium (LOVENOX) syringe 40 mg, 40 mg, Subcutaneous, Daily, Collin Linares MD, 40 mg at 09/19/23 0831    Hold medication, 1 each, Does not apply, Continuous PRN, Justo Avina, APRJOSE LUIS    lisinopril (PRINIVIL,ZESTRIL) tablet 10 mg, 10 mg, Oral, BID, Collin Linares MD, 10 mg at 09/19/23 0831    Magnesium Low Dose Replacement - Follow Nurse / BPA Driven Protocol, , Does not apply, PRN, Collin Linares MD    melatonin tablet  5 mg, 5 mg, Oral, Nightly PRN, Collin Linares MD    metoclopramide (REGLAN) injection 10 mg, 10 mg, Intravenous, Q6H, Edis Valero MD, 10 mg at 09/19/23 1400    morphine injection 2 mg, 2 mg, Intravenous, Q4H PRN, 2 mg at 09/19/23 1308 **AND** naloxone (NARCAN) injection 0.4 mg, 0.4 mg, Intravenous, Q5 Min PRN, Collin Linares MD    morphine injection 4 mg, 4 mg, Intravenous, Q4H PRN, 4 mg at 09/18/23 0315 **AND** naloxone (NARCAN) injection 0.4 mg, 0.4 mg, Intravenous, Q5 Min PRN, Collin Linares MD    nicotine (NICODERM CQ) 21 MG/24HR patch 1 patch, 1 patch, Transdermal, Q24H, Collin Linares MD, 1 patch at 09/19/23 0134    nitroglycerin (NITROSTAT) SL tablet 0.4 mg, 0.4 mg, Sublingual, Q5 Min PRN, Collin Linares MD    ondansetron (ZOFRAN) tablet 4 mg, 4 mg, Oral, Q6H PRN **OR** ondansetron (ZOFRAN) injection 4 mg, 4 mg, Intravenous, Q6H PRN, Collin Linares MD, 4 mg at 09/18/23 1219    Phosphorus Replacement - Follow Nurse / BPA Driven Protocol, , Does not apply, Vijay ORTEGA Enoch K, MD    Potassium Replacement - Follow Nurse / BPA Driven Protocol, , Does not apply, Vijay ORTEGA Enoch K, MD    sodium chloride 0.9 % flush 10 mL, 10 mL, Intravenous, Q12H, Collin Linares MD, 10 mL at 09/19/23 0832    sodium chloride 0.9 % flush 10 mL, 10 mL, Intravenous, PRNVijay Enoch K, MD    sodium chloride 0.9 % infusion 40 mL, 40 mL, Intravenous, PRNVijay Enoch K, MD    SUMAtriptan (IMITREX) tablet 50 mg, 50 mg, Oral, Q2H PRN, Collin Linares MD    Diagnoses/Assessment:     Lower extremity weakness    Depression    Adjustment disorder with depressed mood    Cannabis abuse    Positive urine drug screen for methamphetamine      Treatment Plan:      Adjustment reaction:              --supportive care and addressing her medical issues should help alleviate symptoms.     Suicidal statements:              --Thoughts resolved given some clarity about her physical ailments.  --Stop 1:1 and suicide watch.     Cannabis/Meth:               --Encourage sobriety     Depression:              --Continue Cymbalta 20mg daily.    Psychiatry will sign off.  Let us know if you need any further assistance.    Erma Sandoval MD  09/19/23 at 16:58 CDT

## 2023-09-20 LAB
ANION GAP SERPL CALCULATED.3IONS-SCNC: 8 MMOL/L (ref 5–15)
BASOPHILS # BLD AUTO: 0.07 10*3/MM3 (ref 0–0.2)
BASOPHILS NFR BLD AUTO: 1.1 % (ref 0–1.5)
BUN SERPL-MCNC: 16 MG/DL (ref 6–20)
BUN/CREAT SERPL: 17.4 (ref 7–25)
CALCIUM SPEC-SCNC: 8.9 MG/DL (ref 8.6–10.5)
CHLORIDE SERPL-SCNC: 101 MMOL/L (ref 98–107)
CO2 SERPL-SCNC: 28 MMOL/L (ref 22–29)
CREAT SERPL-MCNC: 0.92 MG/DL (ref 0.57–1)
DEPRECATED RDW RBC AUTO: 39.2 FL (ref 37–54)
EGFRCR SERPLBLD CKD-EPI 2021: 71.9 ML/MIN/1.73
EOSINOPHIL # BLD AUTO: 0.27 10*3/MM3 (ref 0–0.4)
EOSINOPHIL NFR BLD AUTO: 4.2 % (ref 0.3–6.2)
ERYTHROCYTE [DISTWIDTH] IN BLOOD BY AUTOMATED COUNT: 12.2 % (ref 12.3–15.4)
GLUCOSE SERPL-MCNC: 101 MG/DL (ref 65–99)
HCT VFR BLD AUTO: 42.4 % (ref 34–46.6)
HGB BLD-MCNC: 14.2 G/DL (ref 12–15.9)
IMM GRANULOCYTES # BLD AUTO: 0.03 10*3/MM3 (ref 0–0.05)
IMM GRANULOCYTES NFR BLD AUTO: 0.5 % (ref 0–0.5)
LYMPHOCYTES # BLD AUTO: 2.11 10*3/MM3 (ref 0.7–3.1)
LYMPHOCYTES NFR BLD AUTO: 32.8 % (ref 19.6–45.3)
MCH RBC QN AUTO: 29.4 PG (ref 26.6–33)
MCHC RBC AUTO-ENTMCNC: 33.5 G/DL (ref 31.5–35.7)
MCV RBC AUTO: 87.8 FL (ref 79–97)
MONOCYTES # BLD AUTO: 0.43 10*3/MM3 (ref 0.1–0.9)
MONOCYTES NFR BLD AUTO: 6.7 % (ref 5–12)
NEUTROPHILS NFR BLD AUTO: 3.52 10*3/MM3 (ref 1.7–7)
NEUTROPHILS NFR BLD AUTO: 54.7 % (ref 42.7–76)
NRBC BLD AUTO-RTO: 0 /100 WBC (ref 0–0.2)
PLATELET # BLD AUTO: 212 10*3/MM3 (ref 140–450)
PMV BLD AUTO: 10.4 FL (ref 6–12)
POTASSIUM SERPL-SCNC: 4.1 MMOL/L (ref 3.5–5.2)
RBC # BLD AUTO: 4.83 10*6/MM3 (ref 3.77–5.28)
SODIUM SERPL-SCNC: 137 MMOL/L (ref 136–145)
WBC NRBC COR # BLD: 6.43 10*3/MM3 (ref 3.4–10.8)

## 2023-09-20 PROCEDURE — 25010000002 ENOXAPARIN PER 10 MG: Performed by: INTERNAL MEDICINE

## 2023-09-20 PROCEDURE — G0378 HOSPITAL OBSERVATION PER HR: HCPCS

## 2023-09-20 PROCEDURE — 97162 PT EVAL MOD COMPLEX 30 MIN: CPT

## 2023-09-20 PROCEDURE — 25010000002 MORPHINE PER 10 MG: Performed by: INTERNAL MEDICINE

## 2023-09-20 PROCEDURE — 25010000002 METOCLOPRAMIDE PER 10 MG

## 2023-09-20 PROCEDURE — 80048 BASIC METABOLIC PNL TOTAL CA: CPT

## 2023-09-20 PROCEDURE — 97166 OT EVAL MOD COMPLEX 45 MIN: CPT

## 2023-09-20 PROCEDURE — 85025 COMPLETE CBC W/AUTO DIFF WBC: CPT

## 2023-09-20 RX ADMIN — MORPHINE SULFATE 2 MG: 2 INJECTION, SOLUTION INTRAMUSCULAR; INTRAVENOUS at 10:20

## 2023-09-20 RX ADMIN — ENOXAPARIN SODIUM 40 MG: 40 INJECTION SUBCUTANEOUS at 10:13

## 2023-09-20 RX ADMIN — MORPHINE SULFATE 2 MG: 2 INJECTION, SOLUTION INTRAMUSCULAR; INTRAVENOUS at 13:46

## 2023-09-20 RX ADMIN — DULOXETINE HYDROCHLORIDE 20 MG: 20 CAPSULE, DELAYED RELEASE ORAL at 10:12

## 2023-09-20 RX ADMIN — MORPHINE SULFATE 2 MG: 2 INJECTION, SOLUTION INTRAMUSCULAR; INTRAVENOUS at 02:01

## 2023-09-20 RX ADMIN — LISINOPRIL 10 MG: 10 TABLET ORAL at 10:12

## 2023-09-20 RX ADMIN — METOCLOPRAMIDE HYDROCHLORIDE 10 MG: 5 INJECTION INTRAMUSCULAR; INTRAVENOUS at 02:01

## 2023-09-20 RX ADMIN — METOCLOPRAMIDE HYDROCHLORIDE 10 MG: 5 INJECTION INTRAMUSCULAR; INTRAVENOUS at 10:12

## 2023-09-20 RX ADMIN — Medication 10 ML: at 10:12

## 2023-09-20 RX ADMIN — DOCUSATE SODIUM 50 MG AND SENNOSIDES 8.6 MG 2 TABLET: 8.6; 5 TABLET, FILM COATED ORAL at 21:35

## 2023-09-20 RX ADMIN — MORPHINE SULFATE 2 MG: 2 INJECTION, SOLUTION INTRAMUSCULAR; INTRAVENOUS at 21:32

## 2023-09-20 RX ADMIN — METOCLOPRAMIDE HYDROCHLORIDE 10 MG: 5 INJECTION INTRAMUSCULAR; INTRAVENOUS at 21:33

## 2023-09-20 RX ADMIN — CYCLOBENZAPRINE HYDROCHLORIDE 10 MG: 10 TABLET, FILM COATED ORAL at 14:35

## 2023-09-20 RX ADMIN — NICOTINE 1 PATCH: 21 PATCH, EXTENDED RELEASE TRANSDERMAL at 21:44

## 2023-09-20 RX ADMIN — Medication 10 ML: at 21:35

## 2023-09-20 NOTE — THERAPY EVALUATION
Patient Name: Hanna Escalante  : 1965    MRN: [id number]                              Today's Date: 2023       Admit Date: 2023    Visit Dx:     ICD-10-CM ICD-9-CM   1. Weakness of left lower extremity  R29.898 729.89   2. Impaired functional mobility, balance, gait, and endurance [Z74.09 (ICD-10-CM)]  Z74.09 V49.89   3. Impaired mobility and ADLs [Z74.09, Z78.9 (ICD-10-CM)]  Z74.09 V49.89    Z78.9      Patient Active Problem List   Diagnosis    Screening for malignant neoplasm of colon    Neck pain    Tobacco dependence    Insomnia    Back pain    Lower extremity weakness    Depression    Adjustment disorder with depressed mood    Cannabis abuse    Positive urine drug screen for methamphetamine     Past Medical History:   Diagnosis Date    Allergic     Arthritis     COPD (chronic obstructive pulmonary disease)     GERD (gastroesophageal reflux disease)     Hypertension     Infectious viral hepatitis     Low back pain      Past Surgical History:   Procedure Laterality Date    CHOLECYSTECTOMY      COLONOSCOPY N/A 2020    Procedure: COLONOSCOPY;  Surgeon: Joshua Perry MD;  Location: [facility] ENDOSCOPY;  Service: General    HYSTERECTOMY        General Information       Row Name 23 1021          OT Time and Intention    Document Type evaluation  -CM     Mode of Treatment physical therapy;occupational therapy  -CM       Row Name 23 1021          General Information    Patient Profile Reviewed yes  -CM     Prior Level of Function independent:;all household mobility;community mobility;ADL's  -CM     Existing Precautions/Restrictions fall  -CM     Barriers to Rehab medically complex  -CM       Row Name 23 1021          Living Environment    People in Home alone  -CM       Row Name 23 1021          Home Main Entrance    Number of Stairs, Main Entrance one  -CM     Stair Railings, Main Entrance railing on left side (ascending)  -CM       Row Name 23 1021           Stairs Within Home, Primary    Stairs, Within Home, Primary Plans on d/c to rehab as she lives alone and gait is limited. (I) ambulation without an AD. Tub/shower, no shower chair.  -CM     Number of Stairs, Within Home, Primary none  -CM       Row Name 09/20/23 1021          Cognition    Orientation Status (Cognition) oriented x 4  -CM       Row Name 09/20/23 1021          Safety Issues, Functional Mobility    Impairments Affecting Function (Mobility) endurance/activity tolerance;coordination;balance;pain  -CM               User Key  (r) = Recorded By, (t) = Taken By, (c) = Cosigned By      Initials Name Provider Type    CM Perlita Rich, JAN Occupational Therapist                     Mobility/ADL's       Row Name 09/20/23 1021          Bed Mobility    Bed Mobility supine-sit;rolling right;rolling left  -CM     Rolling Left [place] (Bed Mobility) modified independence  -CM     Rolling Right [place] (Bed Mobility) modified independence  -CM     Supine-Sit [place] (Bed Mobility) modified independence  -CM     Assistive Device (Bed Mobility) head of bed elevated;bed rails  -CM       Row Name 09/20/23 1021          Transfers    Transfers sit-stand transfer;stand-sit transfer;toilet transfer  -CM       Row Name 09/20/23 1021          Sit-Stand Transfer    Sit-Stand [place] (Transfers) minimum assist (75% patient effort);2 person assist  -CM     Assistive Device (Sit-Stand Transfers) walker, front-wheeled  -CM       Row Name 09/20/23 1021          Stand-Sit Transfer    Stand-Sit [place] (Transfers) minimum assist (75% patient effort)  -CM     Assistive Device (Stand-Sit Transfers) walker, front-wheeled  -CM       Row Name 09/20/23 1021          Toilet Transfer    Type (Toilet Transfer) sit-stand;stand-sit  -CM     [place] Level (Toilet Transfer) minimum assist (75% patient effort)  -CM     Assistive Device (Toilet Transfer) walker, front-wheeled  -CM       Row Name 09/20/23 1021           Functional Mobility    Functional Mobility- Ind. Level minimum assist (75% patient effort)  -CM     Functional Mobility- Device walker, front-wheeled  -CM     Functional Mobility-Distance (Feet) 15  -CM       Row Name 09/20/23 1021          Activities of Daily Living    BADL Assessment/Intervention lower body dressing;toileting  -CM       Row Name 09/20/23 1021          Lower Body Dressing Assessment/Training    [place] Level (Lower Body Dressing) lower body dressing skills;doff;don;socks;set up  -CM     Position (Lower Body Dressing) edge of bed sitting  -CM       Row Name 09/20/23 1021          Toileting Assessment/Training    [place] Level (Toileting) toileting skills;adjust/manage clothing;perform perineal hygiene;contact guard assist  -CM     Position (Toileting) supported standing  -CM               User Key  (r) = Recorded By, (t) = Taken By, (c) = Cosigned By      Initials Name Provider Type    Perlita Burdick OT Occupational Therapist                   Obj/Interventions       Row Name 09/20/23 1021          Sensory Assessment (Somatosensory)    Sensory Assessment (Somatosensory) UE sensation intact  -CM       Row Name 09/20/23 1021          Range of Motion Comprehensive    General Range of Motion bilateral upper extremity ROM WFL  -CM       Row Name 09/20/23 1021          Strength Comprehensive (MMT)    Comment, General Manual Muscle Testing (MMT) Assessment BUE 4+/5 grossly  -CM               User Key  (r) = Recorded By, (t) = Taken By, (c) = Cosigned By      Initials Name Provider Type    Perlita Burdick, OT Occupational Therapist                   Goals/Plan       Row Name 09/20/23 1021          Transfer Goal 1 (OT)    Activity/Assistive Device (Transfer Goal 1, OT) toilet  -CM     [place] Level/Cues Needed (Transfer Goal 1, OT) supervision required  -CM     Time Frame (Transfer Goal 1, OT) long term goal (LTG);by discharge  -CM     Progress/Outcome (Transfer Goal 1, OT) goal not met   -CM       Row Name 09/20/23 1021          Bathing Goal 1 (OT)    Activity/Device (Bathing Goal 1, OT) lower body bathing  -CM     [place] Level/Cues Needed (Bathing Goal 1, OT) supervision required  -CM     Time Frame (Bathing Goal 1, OT) long term goal (LTG);by discharge  -CM     Progress/Outcomes (Bathing Goal 1, OT) goal not met  -CM       Row Name 09/20/23 1021          Dressing Goal 1 (OT)    Activity/Device (Dressing Goal 1, OT) lower body dressing  -CM     [place]/Cues Needed (Dressing Goal 1, OT) supervision required  -CM     Time Frame (Dressing Goal 1, OT) long term goal (LTG);by discharge  -CM     Progress/Outcome (Dressing Goal 1, OT) goal not met  -CM       Row Name 09/20/23 1021          Problem Specific Goal 1 (OT)    Problem Specific Goal 1 (OT) Pt will tolerate at least 4 continuous minutes of standing ADL with SPV or less and zero LOB for increased balance required for ADLs and mobility.  -CM     Time Frame (Problem Specific Goal 1, OT) long term goal (LTG);by discharge  -CM     Progress/Outcome (Problem Specific Goal 1, OT) goal not met  -CM       Row Name 09/20/23 1021          Therapy Assessment/Plan (OT)    Planned Therapy Interventions (OT) activity tolerance training;adaptive equipment training;BADL retraining;cognitive/visual perception retraining;manual therapy/joint mobilization;IADL retraining;functional balance retraining;edema control/reduction;neuromuscular control/coordination retraining;occupation/activity based interventions;ROM/therapeutic exercise;patient/caregiver education/training;passive ROM/stretching;transfer/mobility retraining;strengthening exercise  -CM               User Key  (r) = Recorded By, (t) = Taken By, (c) = Cosigned By      Initials Name Provider Type    Perlita Burdick OT Occupational Therapist                   Clinical Impression       Row Name 09/20/23 1021          Pain Assessment    Pretreatment Pain Rating 5/10  -CM     Posttreatment Pain  Rating 5/10  -CM     Pain Location - Side/Orientation Left  -CM     Pain Location - flank;abdomen  -CM     Pain Intervention(s) Repositioned;Ambulation/increased activity;Distraction  -CM       Row Name 09/20/23 1021          Plan of Care Review    Plan of Care Reviewed With patient  -CM     Outcome Evaluation OT eval completed. Co-eval with PT. Pt sitting EOB upon arrival. Alert and agreeable to therapy. Pt is (I) with ADLs and mobility at baseline. Pt lives alone. During session, pt was Mod I for bed mobility. Set-up for donning/doffing socks sitting EOB. Pt with BUE ROM WFL and BUE strength 4+/5. Pt was Min A for STS with FWW. Min A for mobility in room with FWW. Min A toileting t/f. CGA faraz-care in supported standing. Pt left supine in bed with exit alarm on and all needs in reach. Pt did experience balance impairment with mobility and required verbal cues throughout for proper FWW positioning. Pt may require rehab to home pending progression with IP OT. IP OT goals established.  -CM       Row Name 09/20/23 1021          Therapy Assessment/Plan (OT)    Patient/Family Therapy Goal Statement (OT) get stronger  -CM     Rehab Potential (OT) good, to achieve stated therapy goals  -CM     Criteria for Skilled Therapeutic Interventions Met (OT) yes;meets criteria  -CM     Therapy Frequency (OT) other (see comments)  5-7 d/wk  -CM     Predicted Duration of Therapy Intervention (OT) until d/c or all goals met  -CM       Row Name 09/20/23 1021          Therapy Plan Review/Discharge Plan (OT)    Anticipated Discharge Disposition (OT) other (see comments);skilled nursing facility;inpatient rehabilitation facility;home with assist;home with home health  rehab to home  -CM       Row Name 09/20/23 1021          Vital Signs    Pre Systolic BP Rehab 128  -CM     Pre Treatment Diastolic BP 78  -CM     Pretreatment Heart Rate (beats/min) 73  -CM     Pre SpO2 (%) 94  -CM     O2 Delivery Pre Treatment room air  -CM     Pre Patient  Position Supine  -CM       Row Name 09/20/23 1021          Positioning and Restraints    Pre-Treatment Position in bed  -CM     Post Treatment Position bed  -CM     In Bed notified nsg;supine;call light within reach;encouraged to call for assist;exit alarm on;side rails up x2  -CM               User Key  (r) = Recorded By, (t) = Taken By, (c) = Cosigned By      Initials Name Provider Type    Perlita Burdick OT Occupational Therapist                   Outcome Measures       Row Name 09/20/23 1021          How much help from another is currently needed...    Putting on and taking off regular lower body clothing? 3  -CM     Bathing (including washing, rinsing, and drying) 3  -CM     Toileting (which includes using toilet bed pan or urinal) 4  -CM     Putting on and taking off regular upper body clothing 4  -CM     Taking care of personal grooming (such as brushing teeth) 4  -CM     Eating meals 4  -CM     AM-PAC 6 Clicks Score (OT) 22  -CM       Row Name 09/20/23 1022 09/20/23 0900       How much help from another person do you currently need...    Turning from your back to your side while in flat bed without using bedrails? 4  -CZ 3  -MB    Moving from lying on back to sitting on the side of a flat bed without bedrails? 4  -CZ 3  -MB    Moving to and from a bed to a chair (including a wheelchair)? 3  -CZ 3  -MB    Standing up from a chair using your arms (e.g., wheelchair, bedside chair)? 3  -CZ 3  -MB    Climbing 3-5 steps with a railing? 3  -CZ 3  -MB    To walk in hospital room? 3  -CZ 3  -MB    AM-PAC 6 Clicks Score (PT) 20  -CZ 18  -MB    Highest level of mobility 6 --> Walked 10 steps or more  -CZ 6 --> Walked 10 steps or more  -MB      Row Name 09/20/23 1022 09/20/23 1021       Functional Assessment    Outcome Measure Options AM-PAC 6 Clicks Basic Mobility (PT)  -CZ AM-PAC 6 Clicks Daily Activity (OT)  -CM              User Key  (r) = Recorded By, (t) = Taken By, (c) = Cosigned By      Initials Name Provider  Type    Wendy Mayfield, RN Registered Nurse    Raul Bustamante, PT Physical Therapist    Perlita Burdick OT Occupational Therapist                    Occupational Therapy Education       Title: PT OT SLP Therapies (In Progress)       Topic: Occupational Therapy (In Progress)       Point: ADL training (Done)       Description:   Instruct learner(s) on proper safety adaptation and remediation techniques during self care or transfers.   Instruct in proper use of assistive devices.                  Learning Progress Summary             Patient Acceptance, E,TB, VU by  at 9/20/2023 8572    Comment: OT POC, Role of OT, d/c recommendations                         Point: Home exercise program (Not Started)       Description:   Instruct learner(s) on appropriate technique for monitoring, assisting and/or progressing therapeutic exercises/activities.                  Learner Progress:  Not documented in this visit.              Point: Precautions (Not Started)       Description:   Instruct learner(s) on prescribed precautions during self-care and functional transfers.                  Learner Progress:  Not documented in this visit.              Point: Body mechanics (Not Started)       Description:   Instruct learner(s) on proper positioning and spine alignment during self-care, functional mobility activities and/or exercises.                  Learner Progress:  Not documented in this visit.                              User Key       Initials Effective Dates Name Provider Type Discipline     11/18/22 -  Perlita Rich OT Occupational Therapist OT                  OT Recommendation and Plan  Planned Therapy Interventions (OT): activity tolerance training, adaptive equipment training, BADL retraining, cognitive/visual perception retraining, manual therapy/joint mobilization, IADL retraining, functional balance retraining, edema control/reduction, neuromuscular control/coordination retraining, occupation/activity  based interventions, ROM/therapeutic exercise, patient/caregiver education/training, passive ROM/stretching, transfer/mobility retraining, strengthening exercise  Therapy Frequency (OT): other (see comments) (5-7 d/wk)  Plan of Care Review  Plan of Care Reviewed With: patient  Outcome Evaluation: OT eval completed. Co-eval with PT. Pt sitting EOB upon arrival. Alert and agreeable to therapy. Pt is (I) with ADLs and mobility at baseline. Pt lives alone. During session, pt was Mod I for bed mobility. Set-up for donning/doffing socks sitting EOB. Pt with BUE ROM WFL and BUE strength 4+/5. Pt was Min A for STS with FWW. Min A for mobility in room with FWW. Min A toileting t/f. CGA faraz-care in supported standing. Pt left supine in bed with exit alarm on and all needs in reach. Pt did experience balance impairment with mobility and required verbal cues throughout for proper FWW positioning. Pt may require rehab to home pending progression with IP OT. IP OT goals established.     Time Calculation:   Evaluation Complexity (OT)  Review Occupational Profile/Medical/Therapy History Complexity: expanded/moderate complexity  Assessment, Occupational Performance/Identification of Deficit Complexity: 3-5 performance deficits  Clinical Decision Making Complexity (OT): detailed assessment/moderate complexity  Overall Complexity of Evaluation (OT): moderate complexity     Time Calculation- OT       Row Name 09/20/23 1528             Time Calculation- OT    OT Start Time 1021  -CM      OT Stop Time 1100  -CM      OT Time Calculation (min) 39 min  -CM      OT Received On 09/20/23  -CM      OT Goal Re-Cert Due Date 10/03/23  -CM         Untimed Charges    OT Eval/Re-eval Minutes 39  -CM         Total Minutes    Untimed Charges Total Minutes 39  -CM       Total Minutes 39  -CM                User Key  (r) = Recorded By, (t) = Taken By, (c) = Cosigned By      Initials Name Provider Type    Perlita Burdick OT Occupational Therapist                   Therapy Charges for Today       Code Description Service Date Service Provider Modifiers Qty    55192471994 HC OT EVAL MOD COMPLEXITY 3 9/20/2023 Perlita Rich OT GO 1                 Perlita Rich OT  9/20/2023

## 2023-09-20 NOTE — PROGRESS NOTES
[facility] Medicine Services  INPATIENT PROGRESS NOTE    Length of Stay: 0  Date of Admission: 9/17/2023  Primary Care Physician: Ninfa Bhatti APRN    Subjective   Chief Complaint: none today  HPI:  History of Present Illness  Patient with past medical history of COPD, GERD, hypertension, arthritis presents with left lower extremity weakness.  Patient states that lower extremity weakness began 3 days ago.  Patient admits to history of substance abuse, and was reportedly positive for methamphetamines at admission. No loose of bowel or urine, no fever, no urinary symptoms or GI symptoms.  CT abdomen/pelvis shows no constipation, or acute abdominal signs.    As of today, 09/20/23  Review of Systems   Constitutional:  Negative for activity change, appetite change, chills, diaphoresis and fatigue.   HENT:  Negative for congestion, ear discharge, hearing loss, rhinorrhea, sinus pain, sneezing and sore throat.    Eyes:  Negative for photophobia, discharge and visual disturbance.   Respiratory:  Negative for cough, chest tightness, shortness of breath and wheezing.    Cardiovascular:  Negative for chest pain and palpitations.   Gastrointestinal:  Negative for abdominal distention, abdominal pain, blood in stool, diarrhea, nausea and vomiting.   Endocrine: Negative for cold intolerance, heat intolerance, polydipsia, polyphagia and polyuria.   Genitourinary:  Negative for dysuria, flank pain, hematuria and urgency.   Musculoskeletal:  Negative for arthralgias, joint swelling and myalgias.   Skin:  Negative for color change.   Allergic/Immunologic: Negative for food allergies.   Neurological:  Negative for dizziness, seizures, syncope, speech difficulty, weakness and headaches.   Hematological:  Negative for adenopathy. Does not bruise/bleed easily.   Psychiatric/Behavioral:  Negative for confusion, hallucinations, self-injury and suicidal ideas. The patient is not  nervous/anxious.       All pertinent negatives and positives are as above. All other systems have been reviewed and are negative unless otherwise stated.    Objective    Temp:  [97.8 °F (36.6 °C)-98.7 °F (37.1 °C)] 98.7 °F (37.1 °C)  Heart Rate:  [66-85] 71  Resp:  [16-18] 18  BP: (128-169)/(73-85) 138/73    AM-PAC 6 Clicks Score (PT): 20 (09/20/23 1022)    As of today, 09/20/23  Physical Exam  Constitutional:       Appearance: Normal appearance.   HENT:      Head: Normocephalic and atraumatic.      Right Ear: Tympanic membrane normal.      Left Ear: Tympanic membrane normal.      Nose: Nose normal.      Mouth/Throat:      Mouth: Mucous membranes are moist.   Eyes:      Pupils: Pupils are equal, round, and reactive to light.   Cardiovascular:      Rate and Rhythm: Normal rate and regular rhythm.      Pulses: Normal pulses.      Heart sounds: Normal heart sounds.   Pulmonary:      Effort: Pulmonary effort is normal.      Breath sounds: Normal breath sounds.   Abdominal:      General: Abdomen is flat. Bowel sounds are normal.      Palpations: Abdomen is soft.   Musculoskeletal:         General: Normal range of motion.      Cervical back: Normal range of motion and neck supple.   Skin:     General: Skin is warm and dry.      Capillary Refill: Capillary refill takes less than 2 seconds.   Neurological:      General: No focal deficit present.      Mental Status: She is alert and oriented to person, place, and time.   Psychiatric:         Mood and Affect: Mood normal.         Behavior: Behavior normal.         Thought Content: Thought content normal.         Judgment: Judgment normal.         Results Review:  I have reviewed the labs, radiology results, and diagnostic studies.    Laboratory Data:   Results from last 7 days   Lab Units 09/20/23  0511 09/19/23  0556 09/17/23  1843   SODIUM mmol/L 137 138 139   POTASSIUM mmol/L 4.1 4.0 4.2   CHLORIDE mmol/L 101 103 103   CO2 mmol/L 28.0 25.0 26.0   BUN mg/dL 16 22* 22*    CREATININE mg/dL 0.92 0.88 1.05*   GLUCOSE mg/dL 101* 107* 108*   CALCIUM mg/dL 8.9 8.8 9.0   BILIRUBIN mg/dL  --   --  0.5   ALK PHOS U/L  --   --  129*   ALT (SGPT) U/L  --   --  41*   AST (SGOT) U/L  --   --  37*   ANION GAP mmol/L 8.0 10.0 10.0     Estimated Creatinine Clearance: 58.8 mL/min (by C-G formula based on SCr of 0.92 mg/dL).  Results from last 7 days   Lab Units 09/19/23  0556 09/17/23  1843   MAGNESIUM mg/dL 1.9 2.0         Results from last 7 days   Lab Units 09/20/23  0511 09/19/23  0556 09/18/23  0831 09/17/23  1843   WBC 10*3/mm3 6.43 5.78 6.91 8.17   HEMOGLOBIN g/dL 14.2 14.7 15.0 16.4*   HEMATOCRIT % 42.4 43.5 43.0 47.4*   PLATELETS 10*3/mm3 212 195 195 236           Culture Data:   No results found for: BLOODCX  No results found for: URINECX  No results found for: RESPCX  No results found for: WOUNDCX  No results found for: STOOLCX  No components found for: BODYFLD    Radiology Data:   Imaging Results (Last 24 Hours)       ** No results found for the last 24 hours. **            I have utilized all available immediate resources to obtain, update, or review the patient's current medications (including all prescriptions, over-the-counter products, herbals, cannabis/cannabidiol products, and vitamin/mineral/dietary (nutritional) supplements).       Assessment/Plan     Active Hospital Problems    Diagnosis     **Lower extremity weakness     Depression     Adjustment disorder with depressed mood     Cannabis abuse     Positive urine drug screen for methamphetamine          Assessment and   Plan:  57 years old female patient with chronic back pain, takes care of herself at home with some mobility problems, complaining of lower back pain and leg weakness, MRI spine showing myelitis at T4 - T8 rule out infectious vs demyelinating process, pending CSF results, arterial US legs normal.  Left lower extremity weakness: MRI spine: Abnormal T2 hyperintensity within the dorsal column of the thoracic spinal  cord from the T4 level through the T8 level.  No abnormal enhancement.  These findings are nonspecific.  This may represent a demyelinating process. An infectious/viral myelitis is also consideration.  This is not have the typical appearance of neoplasm which is considered unlikely.  Possibility of cord infarct is also a consideration. Tele neurology consulted and ordered LP, pending CSF results, CSF appears normal.     Suicidal ideation:  - Followed by DR Good     Polysubstance abuse:  - Patient positive for methamphetamines 9/13/2023.  Patient's urine drug screen negative for methamphetamines 9/17.  Recommend patient stop using recreational drugs.       Hypertension: Continue home medications    Medical Decision Making  Number and Complexity of problems: 3  Differential Diagnosis: myelitis    Conditions and Status:        Condition is improving.     [facility] Data  External documents reviewed: previous medical records  My EKG interpretation: sinus rhythm  My CT interpretation: MRI spine thoracic spine myelitis  Tests considered but not ordered: none     Decision rules/scores evaluated (example [id number], [place], etc): n/a     Discussed with: patient     Treatment Plan  Pending CSF results    Care Planning  Shared decision making: with the patient  Code status and discussions: full code    Disposition  Social Determinants of Health that impact treatment or disposition: none  I expect the patient to be discharged to rehab in 5 days.       I confirmed that the patient's Advance Care Plan is present, code status is documented, or surrogate decision maker is listed in the patient's medical record.      This document has been electronically signed by Edis Valero MD on September 20, 2023 17:23 CDT

## 2023-09-20 NOTE — THERAPY EVALUATION
Patient Name: Hanna Escalante  : 1965    MRN: [id number]                              Today's Date: 2023       Admit Date: 2023    Visit Dx:     ICD-10-CM ICD-9-CM   1. Weakness of left lower extremity  R29.898 729.89   2. Impaired functional mobility, balance, gait, and endurance [Z74.09 (ICD-10-CM)]  Z74.09 V49.89     Patient Active Problem List   Diagnosis    Screening for malignant neoplasm of colon    Neck pain    Tobacco dependence    Insomnia    Back pain    Lower extremity weakness    Depression    Adjustment disorder with depressed mood    Cannabis abuse    Positive urine drug screen for methamphetamine     Past Medical History:   Diagnosis Date    Allergic     Arthritis     COPD (chronic obstructive pulmonary disease)     GERD (gastroesophageal reflux disease)     Hypertension     Infectious viral hepatitis     Low back pain      Past Surgical History:   Procedure Laterality Date    CHOLECYSTECTOMY      COLONOSCOPY N/A 2020    Procedure: COLONOSCOPY;  Surgeon: Joshua Perry MD;  Location: [facility] ENDOSCOPY;  Service: General    HYSTERECTOMY        General Information       Row Name 23 1022          Physical Therapy Time and Intention    Document Type evaluation  -CZ     Mode of Treatment physical therapy;occupational therapy  -CZ       Row Name 23 1022          General Information    Patient Profile Reviewed yes  -CZ     Prior Level of Function independent:;all household mobility;community mobility  -CZ     Existing Precautions/Restrictions fall  -CZ     Barriers to Rehab medically complex  -CZ       Row Name 23 1022          Living Environment    People in Home alone  -CZ       Row Name 23 1022          Home Main Entrance    Number of Stairs, Main Entrance one  -CZ     Stair Railings, Main Entrance railing on left side (ascending)  -CZ       Row Name 23 1022          Stairs Within Home, Primary    Stairs, Within Home, Primary Plans on d/c to  rehab as she lives alone and gait is limited. (I) ambulation without an AD. Tub/shower, no shower chair.  -CZ     Number of Stairs, Within Home, Primary none  -CZ       Row Name 09/20/23 1022          Cognition    Orientation Status (Cognition) oriented x 4  -CZ       Row Name 09/20/23 1022          Safety Issues, Functional Mobility    Impairments Affecting Function (Mobility) endurance/activity tolerance;coordination;balance;pain  -CZ               User Key  (r) = Recorded By, (t) = Taken By, (c) = Cosigned By      Initials Name Provider Type    CZ Raul Leung, PT Physical Therapist                   Mobility       Row Name 09/20/23 1022          Bed Mobility    Bed Mobility supine-sit;rolling right;rolling left  -CZ     Rolling Left Fowler (Bed Mobility) modified independence  -CZ     Rolling Right Fowler (Bed Mobility) modified independence  -CZ     Supine-Sit Fowler (Bed Mobility) modified independence  -CZ     Assistive Device (Bed Mobility) head of bed elevated;bed rails  -CZ       Row Name 09/20/23 1022          Sit-Stand Transfer    Sit-Stand Fowler (Transfers) minimum assist (75% patient effort);2 person assist  -CZ     Assistive Device (Sit-Stand Transfers) walker, front-wheeled  -CZ       Row Name 09/20/23 1022          Gait/Stairs (Locomotion)    Fowler Level (Gait) minimum assist (75% patient effort)  -CZ     Assistive Device (Gait) walker, front-wheeled  -CZ     Distance in Feet (Gait) 10'x1, 25'x1.  -CZ     Comment, (Gait/Stairs) Stooped posture, ataxic gait, holds L knee in extension, no toe-off.  -CZ               User Key  (r) = Recorded By, (t) = Taken By, (c) = Cosigned By      Initials Name Provider Type    CZ Raul Leung, PT Physical Therapist                   Obj/Interventions       Row Name 09/20/23 1022          Range of Motion Comprehensive    General Range of Motion bilateral lower extremity ROM WFL  -CZ       Row Name 09/20/23 1022           Strength Comprehensive (MMT)    Comment, General Manual Muscle Testing (MMT) Assessment BLEs: 4+/5 grossly.  -CZ       Row Name 09/20/23 1022          Sensory Assessment (Somatosensory)    Sensory Assessment (Somatosensory) bilateral LE  -CZ     Bilateral LE Sensory Assessment light touch awareness;impaired  -CZ               User Key  (r) = Recorded By, (t) = Taken By, (c) = Cosigned By      Initials Name Provider Type    CZ Raul Leung, PT Physical Therapist                   Goals/Plan       Row Name 09/20/23 1022          Bed Mobility Goal 1 (PT)    Activity/Assistive Device (Bed Mobility Goal 1, PT) sit to supine/supine to sit  -CZ     Grady Level/Cues Needed (Bed Mobility Goal 1, PT) independent  -CZ     Time Frame (Bed Mobility Goal 1, PT) by discharge  -CZ     Progress/Outcomes (Bed Mobility Goal 1, PT) goal not met  -CZ       Row Name 09/20/23 1022          Transfer Goal 1 (PT)    Activity/Assistive Device (Transfer Goal 1, PT) sit-to-stand/stand-to-sit;bed-to-chair/chair-to-bed;walker, rolling  -CZ     Grady Level/Cues Needed (Transfer Goal 1, PT) modified independence  -CZ     Strategies/Barriers (Transfers Goal 1, PT) Strong LEs, decreased sensation, ataxic gait.  -CZ     Progress/Outcome (Transfer Goal 1, PT) goal not met  -CZ       Row Name 09/20/23 1022          Gait Training Goal 1 (PT)    Activity/Assistive Device (Gait Training Goal 1, PT) walker, rolling  -CZ     Grady Level (Gait Training Goal 1, PT) modified independence  -CZ     Distance (Gait Training Goal 1, PT) 50'x2.  -CZ     Time Frame (Gait Training Goal 1, PT) by discharge  -CZ     Strategies/Barriers (Gait Training Goal 1, PT) Strong LEs, decreased sensation, ataxic gait.  -CZ     Progress/Outcome (Gait Training Goal 1, PT) goal not met  -CZ       Row Name 09/20/23 1022          Stairs Goal 1 (PT)    Activity/Assistive Device (Stairs Goal 1, PT) ascending stairs;descending stairs  -CZ     Grady  Level/Cues Needed (Stairs Goal 1, PT) modified independence  -CZ     Number of Stairs (Stairs Goal 1, PT) 1 step, may use FWW.  -CZ     Time Frame (Stairs Goal 1, PT) by discharge  -CZ     Strategies/Barriers (Stairs Goal 1, PT) Strong LEs, decreased sensation, ataxic gait.  -CZ     Progress/Outcome (Stairs Goal 1, PT) goal not met  -CZ       Row Name 09/20/23 1022          Therapy Assessment/Plan (PT)    Planned Therapy Interventions (PT) balance training;bed mobility training;gait training;home exercise program;patient/family education;transfer training;ROM (range of motion);strengthening;stair training;stretching  -CZ               User Key  (r) = Recorded By, (t) = Taken By, (c) = Cosigned By      Initials Name Provider Type    CZ Raul Leung, PT Physical Therapist                   Clinical Impression       Row Name 09/20/23 1022          Pain    Pretreatment Pain Rating 5/10  -CZ     Posttreatment Pain Rating 5/10  -CZ     Pain Location - Side/Orientation Left  -CZ     Pain Location - flank;abdomen  -CZ       Row Name 09/20/23 1022          Plan of Care Review    Plan of Care Reviewed With patient  -CZ     Outcome Evaluation Initial PT evaluation complete, co-evaluation with OT. Patient is alert and cooperative.  She demonstrates (I) with bed mobility, requires min Ax1-2 with transfers and gait, ambulating 10'x1, 25'x1 with FWW, stooped posture, ataxic gait, holds L knee in extension, poor toe-off.  Patient demonstrates 4+/5 strength in bilateral LEs, sensation is decreased bilaterally, but not absent.  Patient lives alone, will likely need a FWW and may need placement for rehab prior to discharge home, pending progress with I/P PT.  Goals established, continue skilled I/P PT.  -CZ       Row Name 09/20/23 1022          Therapy Assessment/Plan (PT)    Criteria for Skilled Interventions Met (PT) yes;skilled treatment is necessary  -CZ     Therapy Frequency (PT) 5 times/wk  -CZ       Row Name 09/20/23 1022           Vital Signs    Pre Systolic BP Rehab 128  -CZ     Pre Treatment Diastolic BP 78  -CZ     Pretreatment Heart Rate (beats/min) 73  -CZ     Pre SpO2 (%) 94  -CZ     O2 Delivery Pre Treatment room air  -CZ     Pre Patient Position Supine  -CZ       Row Name 09/20/23 1022          Positioning and Restraints    Pre-Treatment Position in bed  -CZ               User Key  (r) = Recorded By, (t) = Taken By, (c) = Cosigned By      Initials Name Provider Type    CZ Raul Leung, PT Physical Therapist                   Outcome Measures       Row Name 09/20/23 1022 09/20/23 0900       How much help from another person do you currently need...    Turning from your back to your side while in flat bed without using bedrails? 4  -CZ 3  -MB    Moving from lying on back to sitting on the side of a flat bed without bedrails? 4  -CZ 3  -MB    Moving to and from a bed to a chair (including a wheelchair)? 3  -CZ 3  -MB    Standing up from a chair using your arms (e.g., wheelchair, bedside chair)? 3  -CZ 3  -MB    Climbing 3-5 steps with a railing? 3  -CZ 3  -MB    To walk in hospital room? 3  -CZ 3  -MB    AM-PAC 6 Clicks Score (PT) 20  -CZ 18  -MB    Highest level of mobility 6 --> Walked 10 steps or more  -CZ 6 --> Walked 10 steps or more  -MB      Row Name 09/20/23 1022 09/20/23 1021       Functional Assessment    Outcome Measure Options AM-PAC 6 Clicks Basic Mobility (PT)  -CZ AM-PAC 6 Clicks Daily Activity (OT)  -CM              User Key  (r) = Recorded By, (t) = Taken By, (c) = Cosigned By      Initials Name Provider Type    Wendy Mayfield, RN Registered Nurse    Raul Bustamante, PT Physical Therapist    Perlita Burdick OT Occupational Therapist                                 Physical Therapy Education       Title: PT OT SLP Therapies (In Progress)       Topic: Physical Therapy (In Progress)       Point: Mobility training (In Progress)       Learning Progress Summary             Patient Acceptance, E, NR by SARAH  at 9/20/2023 1052    Comment: PT POC, hand  placenent with transfers, rehab process.                         Point: Home exercise program (Not Started)       Learner Progress:  Not documented in this visit.              Point: Body mechanics (Not Started)       Learner Progress:  Not documented in this visit.              Point: Precautions (Not Started)       Learner Progress:  Not documented in this visit.                              User Key       Initials Effective Dates Name Provider Type Discipline     07/11/23 -  Raul Leung, PT Physical Therapist PT                  PT Recommendation and Plan  Planned Therapy Interventions (PT): balance training, bed mobility training, gait training, home exercise program, patient/family education, transfer training, ROM (range of motion), strengthening, stair training, stretching  Plan of Care Reviewed With: patient  Outcome Evaluation: Initial PT evaluation complete, co-evaluation with OT. Patient is alert and cooperative.  She demonstrates (I) with bed mobility, requires min Ax1-2 with transfers and gait, ambulating 10'x1, 25'x1 with FWW, stooped posture, ataxic gait, holds L knee in extension, poor toe-off.  Patient demonstrates 4+/5 strength in bilateral LEs, sensation is decreased bilaterally, but not absent.  Patient lives alone, will likely need a FWW and may need placement for rehab prior to discharge home, pending progress with I/P PT.  Goals established, continue skilled I/P PT.     Time Calculation:   PT Evaluation Complexity  History, PT Evaluation Complexity: 1-2 personal factors and/or comorbidities  Examination of Body Systems (PT Eval Complexity): total of 3 or more elements  Clinical Presentation (PT Evaluation Complexity): evolving  Clinical Decision Making (PT Evaluation Complexity): moderate complexity  Overall Complexity (PT Evaluation Complexity): moderate complexity     PT Charges       Row Name 09/20/23 1152             Time Calculation     Start Time 1022  -CZ      Stop Time 1055  -CZ      Time Calculation (min) 33 min  -CZ      PT Received On 09/20/23  -CZ      PT Goal Re-Cert Due Date 10/03/23  -CZ         Untimed Charges    PT Eval/Re-eval Minutes 33  -CZ         Total Minutes    Untimed Charges Total Minutes 33  -CZ       Total Minutes 33  -CZ                User Key  (r) = Recorded By, (t) = Taken By, (c) = Cosigned By      Initials Name Provider Type    CZ Raul Leung, PT Physical Therapist                  Therapy Charges for Today       Code Description Service Date Service Provider Modifiers Qty    [id number] HC PT EVAL MOD COMPLEXITY 2 9/20/2023 Raul Leung, PT GP 1            PT G-Codes  Outcome Measure Options: AM-PAC 6 Clicks Basic Mobility (PT)  AM-PAC 6 Clicks Score (PT): 20  AM-PAC 6 Clicks Score (OT): 22       Raul Leung PT  9/20/2023

## 2023-09-20 NOTE — THERAPY EVALUATION
Patient Name: Hanna Escalante  : 1965    MRN: [id number]                              Today's Date: 2023       Admit Date: 2023    Visit Dx:     ICD-10-CM ICD-9-CM   1. Weakness of left lower extremity  R29.898 729.89   2. Impaired functional mobility, balance, gait, and endurance [Z74.09 (ICD-10-CM)]  Z74.09 V49.89     Patient Active Problem List   Diagnosis    Screening for malignant neoplasm of colon    Neck pain    Tobacco dependence    Insomnia    Back pain    Lower extremity weakness    Depression    Adjustment disorder with depressed mood    Cannabis abuse    Positive urine drug screen for methamphetamine     Past Medical History:   Diagnosis Date    Allergic     Arthritis     COPD (chronic obstructive pulmonary disease)     GERD (gastroesophageal reflux disease)     Hypertension     Infectious viral hepatitis     Low back pain      Past Surgical History:   Procedure Laterality Date    CHOLECYSTECTOMY      COLONOSCOPY N/A 2020    Procedure: COLONOSCOPY;  Surgeon: Joshua Perry MD;  Location: [facility] ENDOSCOPY;  Service: General    HYSTERECTOMY        General Information       Row Name 23 1022          Physical Therapy Time and Intention    Document Type evaluation  -CZ     Mode of Treatment physical therapy;occupational therapy  -CZ       Row Name 23 1022          General Information    Patient Profile Reviewed yes  -CZ     Prior Level of Function independent:;all household mobility;community mobility  -CZ     Existing Precautions/Restrictions fall  -CZ     Barriers to Rehab medically complex  -CZ       Row Name 23 1022          Living Environment    People in Home alone  -CZ       Row Name 23 1022          Home Main Entrance    Number of Stairs, Main Entrance one  -CZ     Stair Railings, Main Entrance railing on left side (ascending)  -CZ       Row Name 23 1022          Stairs Within Home, Primary    Stairs, Within Home, Primary Plans on d/c to  rehab as she lives alone and gait is limited. (I) ambulation without an AD. Tub/shower, no shower chair.  -CZ     Number of Stairs, Within Home, Primary none  -CZ       Row Name 09/20/23 1022          Cognition    Orientation Status (Cognition) oriented x 4  -CZ       Row Name 09/20/23 1022          Safety Issues, Functional Mobility    Impairments Affecting Function (Mobility) endurance/activity tolerance;coordination;balance;pain  -CZ               User Key  (r) = Recorded By, (t) = Taken By, (c) = Cosigned By      Initials Name Provider Type    CZ Raul Leung, PT Physical Therapist                   Mobility       Row Name 09/20/23 1022          Bed Mobility    Bed Mobility supine-sit;rolling right;rolling left  -CZ     Rolling Left [place] (Bed Mobility) modified independence  -CZ     Rolling Right [place] (Bed Mobility) modified independence  -CZ     Supine-Sit [place] (Bed Mobility) modified independence  -CZ     Assistive Device (Bed Mobility) head of bed elevated;bed rails  -CZ       Row Name 09/20/23 1022          Sit-Stand Transfer    Sit-Stand [place] (Transfers) minimum assist (75% patient effort);2 person assist  -CZ     Assistive Device (Sit-Stand Transfers) walker, front-wheeled  -CZ       Row Name 09/20/23 1022          Gait/Stairs (Locomotion)    [place] Level (Gait) minimum assist (75% patient effort)  -CZ     Assistive Device (Gait) walker, front-wheeled  -CZ     Distance in Feet (Gait) 10'x1, 25'x1.  -CZ     Comment, (Gait/Stairs) Stooped posture, ataxic gait, holds L knee in extension, no toe-off.  -CZ               User Key  (r) = Recorded By, (t) = Taken By, (c) = Cosigned By      Initials Name Provider Type    CZ Raul Leung, PT Physical Therapist                   Obj/Interventions       Row Name 09/20/23 1022          Range of Motion Comprehensive    General Range of Motion bilateral lower extremity ROM WFL  -CZ       Row Name 09/20/23 1022           Strength Comprehensive (MMT)    Comment, General Manual Muscle Testing (MMT) Assessment BLEs: 4+/5 grossly.  -CZ       Row Name 09/20/23 1022          Sensory Assessment (Somatosensory)    Sensory Assessment (Somatosensory) bilateral LE  -CZ     Bilateral LE Sensory Assessment light touch awareness;impaired  -CZ               User Key  (r) = Recorded By, (t) = Taken By, (c) = Cosigned By      Initials Name Provider Type    CZ Raul Leung, PT Physical Therapist                   Goals/Plan       Row Name 09/20/23 1022          Bed Mobility Goal 1 (PT)    Activity/Assistive Device (Bed Mobility Goal 1, PT) sit to supine/supine to sit  -CZ     [place] Level/Cues Needed (Bed Mobility Goal 1, PT) independent  -CZ     Time Frame (Bed Mobility Goal 1, PT) by discharge  -CZ     Progress/Outcomes (Bed Mobility Goal 1, PT) goal not met  -CZ       Row Name 09/20/23 1022          Transfer Goal 1 (PT)    Activity/Assistive Device (Transfer Goal 1, PT) sit-to-stand/stand-to-sit;bed-to-chair/chair-to-bed;walker, rolling  -CZ     [place] Level/Cues Needed (Transfer Goal 1, PT) modified independence  -CZ     Strategies/Barriers (Transfers Goal 1, PT) Strong LEs, decreased sensation, ataxic gait.  -CZ     Progress/Outcome (Transfer Goal 1, PT) goal not met  -CZ       Row Name 09/20/23 1022          Gait Training Goal 1 (PT)    Activity/Assistive Device (Gait Training Goal 1, PT) walker, rolling  -CZ     [place] Level (Gait Training Goal 1, PT) modified independence  -CZ     Distance (Gait Training Goal 1, PT) 50'x2.  -CZ     Time Frame (Gait Training Goal 1, PT) by discharge  -CZ     Strategies/Barriers (Gait Training Goal 1, PT) Strong LEs, decreased sensation, ataxic gait.  -CZ     Progress/Outcome (Gait Training Goal 1, PT) goal not met  -CZ       Row Name 09/20/23 1022          Stairs Goal 1 (PT)    Activity/Assistive Device (Stairs Goal 1, PT) ascending stairs;descending stairs  -CZ     [place]  Level/Cues Needed (Stairs Goal 1, PT) modified independence  -CZ     Number of Stairs (Stairs Goal 1, PT) 1 step, may use FWW.  -CZ     Time Frame (Stairs Goal 1, PT) by discharge  -CZ     Strategies/Barriers (Stairs Goal 1, PT) Strong LEs, decreased sensation, ataxic gait.  -CZ     Progress/Outcome (Stairs Goal 1, PT) goal not met  -CZ       Row Name 09/20/23 1022          Therapy Assessment/Plan (PT)    Planned Therapy Interventions (PT) balance training;bed mobility training;gait training;home exercise program;patient/family education;transfer training;ROM (range of motion);strengthening;stair training;stretching  -CZ               User Key  (r) = Recorded By, (t) = Taken By, (c) = Cosigned By      Initials Name Provider Type    CZ Raul Leung, PT Physical Therapist                   Clinical Impression       Row Name 09/20/23 1022          Pain    Pretreatment Pain Rating 5/10  -CZ     Posttreatment Pain Rating 5/10  -CZ     Pain Location - Side/Orientation Left  -CZ     Pain Location - flank;abdomen  -CZ       Row Name 09/20/23 1022          Plan of Care Review    Plan of Care Reviewed With patient  -CZ     Outcome Evaluation Initial PT evaluation complete, co-evaluation with OT. Patient is alert and cooperative.  She demonstrates (I) with bed mobility, requires min Ax1-2 with transfers and gait, ambulating 10'x1, 25'x1 with FWW, stooped posture, ataxic gait, holds L knee in extension, poor toe-off.  Patient demonstrates 4+/5 strength in bilateral LEs, sensation is decreased bilaterally, but not absent.  Patient lives alone, will likely need a FWW and may need placement for rehab prior to discharge home, pending progress with I/P PT.  Goals established, continue skilled I/P PT.  -CZ       Row Name 09/20/23 1022          Therapy Assessment/Plan (PT)    Criteria for Skilled Interventions Met (PT) yes;skilled treatment is necessary  -CZ     Therapy Frequency (PT) 5 times/wk  -CZ       Row Name 09/20/23 1022           Vital Signs    Pre Systolic BP Rehab 128  -CZ     Pre Treatment Diastolic BP 78  -CZ     Pretreatment Heart Rate (beats/min) 73  -CZ     Pre SpO2 (%) 94  -CZ     O2 Delivery Pre Treatment room air  -CZ     Pre Patient Position Supine  -CZ       Row Name 09/20/23 1022          Positioning and Restraints    Pre-Treatment Position in bed  -CZ     In Bed supine;call light within reach;encouraged to call for assist;exit alarm on  -CZ               User Key  (r) = Recorded By, (t) = Taken By, (c) = Cosigned By      Initials Name Provider Type    Raul Bustamante, PT Physical Therapist                   Outcome Measures       Row Name 09/20/23 1022 09/20/23 0900       How much help from another person do you currently need...    Turning from your back to your side while in flat bed without using bedrails? 4  -CZ 3  -MB    Moving from lying on back to sitting on the side of a flat bed without bedrails? 4  -CZ 3  -MB    Moving to and from a bed to a chair (including a wheelchair)? 3  -CZ 3  -MB    Standing up from a chair using your arms (e.g., wheelchair, bedside chair)? 3  -CZ 3  -MB    Climbing 3-5 steps with a railing? 3  -CZ 3  -MB    To walk in hospital room? 3  -CZ 3  -MB    AM-PAC 6 Clicks Score (PT) 20  -CZ 18  -MB    Highest level of mobility 6 --> Walked 10 steps or more  -CZ 6 --> Walked 10 steps or more  -MB      Row Name 09/20/23 1022 09/20/23 1021       Functional Assessment    Outcome Measure Options AM-PAC 6 Clicks Basic Mobility (PT)  -CZ AM-PAC 6 Clicks Daily Activity (OT)  -CM              User Key  (r) = Recorded By, (t) = Taken By, (c) = Cosigned By      Initials Name Provider Type    Wendy Mayfield RN Registered Nurse    Raul Bustamante, PT Physical Therapist    Perlita Burdick OT Occupational Therapist                                 Physical Therapy Education       Title: PT OT SLP Therapies (In Progress)       Topic: Physical Therapy (In Progress)       Point: Mobility  training (In Progress)       Learning Progress Summary             Patient Acceptance, E, NR by SARAH at 9/20/2023 1057    Comment: PT POC, hand  placenent with transfers, rehab process.                         Point: Home exercise program (Not Started)       Learner Progress:  Not documented in this visit.              Point: Body mechanics (Not Started)       Learner Progress:  Not documented in this visit.              Point: Precautions (Not Started)       Learner Progress:  Not documented in this visit.                              User Key       Initials Effective Dates Name Provider Type Discipline     07/11/23 -  Raul Leung, PT Physical Therapist PT                  PT Recommendation and Plan  Planned Therapy Interventions (PT): balance training, bed mobility training, gait training, home exercise program, patient/family education, transfer training, ROM (range of motion), strengthening, stair training, stretching  Plan of Care Reviewed With: patient  Outcome Evaluation: Initial PT evaluation complete, co-evaluation with OT. Patient is alert and cooperative.  She demonstrates (I) with bed mobility, requires min Ax1-2 with transfers and gait, ambulating 10'x1, 25'x1 with FWW, stooped posture, ataxic gait, holds L knee in extension, poor toe-off.  Patient demonstrates 4+/5 strength in bilateral LEs, sensation is decreased bilaterally, but not absent.  Patient lives alone, will likely need a FWW and may need placement for rehab prior to discharge home, pending progress with I/P PT.  Goals established, continue skilled I/P PT.     Time Calculation:   PT Evaluation Complexity  History, PT Evaluation Complexity: 1-2 personal factors and/or comorbidities  Examination of Body Systems (PT Eval Complexity): total of 3 or more elements  Clinical Presentation (PT Evaluation Complexity): evolving  Clinical Decision Making (PT Evaluation Complexity): moderate complexity  Overall Complexity (PT Evaluation Complexity):  moderate complexity     PT Charges       Row Name 09/20/23 1152             Time Calculation    Start Time 1022  -CZ      Stop Time 1055  -CZ      Time Calculation (min) 33 min  -CZ      PT Received On 09/20/23  -CZ      PT Goal Re-Cert Due Date 10/03/23  -CZ         Untimed Charges    PT Eval/Re-eval Minutes 33  -CZ         Total Minutes    Untimed Charges Total Minutes 33  -CZ       Total Minutes 33  -CZ                User Key  (r) = Recorded By, (t) = Taken By, (c) = Cosigned By      Initials Name Provider Type    CZ Raul Leung, PT Physical Therapist                  Therapy Charges for Today       Code Description Service Date Service Provider Modifiers Qty    [id number] HC PT EVAL MOD COMPLEXITY 2 9/20/2023 Raul Leung, PT GP 1            PT G-Codes  Outcome Measure Options: AM-PAC 6 Clicks Basic Mobility (PT)  AM-PAC 6 Clicks Score (PT): 20  AM-PAC 6 Clicks Score (OT): 22  PT Discharge Summary  Anticipated Discharge Disposition (PT): home with assist, home with home health, inpatient rehabilitation facility, skilled nursing facility    Raul Leung, LEONCIO  9/20/2023

## 2023-09-20 NOTE — PLAN OF CARE
Goal Outcome Evaluation:  Plan of Care Reviewed With: patient        Progress: improving  Outcome Evaluation: VSS, multiple BM's throughout this shift so far, LP site CDI, c/o pain managed with PRN meds per orders per pt request, no other complaints.

## 2023-09-20 NOTE — PLAN OF CARE
Goal Outcome Evaluation:  Plan of Care Reviewed With: patient           Outcome Evaluation: Initial PT evaluation complete, co-evaluation with OT. Patient is alert and cooperative.  She demonstrates (I) with bed mobility, requires min Ax1-2 with transfers and gait, ambulating 10'x1, 25'x1 with FWW, stooped posture, ataxic gait, holds L knee in extension, poor toe-off.  Patient demonstrates 4+/5 strength in bilateral LEs, sensation is decreased bilaterally, but not absent.  Patient lives alone, will likely need a FWW and may need placement for rehab prior to discharge home, pending progress with I/P PT.  Goals established, continue skilled I/P PT.

## 2023-09-20 NOTE — PLAN OF CARE
Goal Outcome Evaluation:  Plan of Care Reviewed With: patient           Outcome Evaluation: OT eval completed. Co-eval with PT. Pt sitting EOB upon arrival. Alert and agreeable to therapy. Pt is (I) with ADLs and mobility at baseline. Pt lives alone. During session, pt was Mod I for bed mobility. Set-up for donning/doffing socks sitting EOB. Pt with BUE ROM WFL and BUE strength 4+/5. Pt was Min A for STS with FWW. Min A for mobility in room with FWW. Min A toileting t/f. CGA faraz-care in supported standing. Pt left supine in bed with exit alarm on and all needs in reach. Pt did experience balance impairment with mobility and required verbal cues throughout for proper FWW positioning. Pt may require rehab to home pending progression with IP OT. IP OT goals established.      Anticipated Discharge Disposition (OT): other (see comments), skilled nursing facility, inpatient rehabilitation facility, home with assist, home with home health (rehab to home)

## 2023-09-21 LAB
ANION GAP SERPL CALCULATED.3IONS-SCNC: 8 MMOL/L (ref 5–15)
BASOPHILS # BLD AUTO: 0.07 10*3/MM3 (ref 0–0.2)
BASOPHILS NFR BLD AUTO: 1.2 % (ref 0–1.5)
BUN SERPL-MCNC: 25 MG/DL (ref 6–20)
BUN/CREAT SERPL: 25.5 (ref 7–25)
CALCIUM SPEC-SCNC: 9.3 MG/DL (ref 8.6–10.5)
CHLORIDE SERPL-SCNC: 101 MMOL/L (ref 98–107)
CO2 SERPL-SCNC: 28 MMOL/L (ref 22–29)
CREAT SERPL-MCNC: 0.98 MG/DL (ref 0.57–1)
DEPRECATED RDW RBC AUTO: 39.6 FL (ref 37–54)
EGFRCR SERPLBLD CKD-EPI 2021: 66.6 ML/MIN/1.73
EOSINOPHIL # BLD AUTO: 0.29 10*3/MM3 (ref 0–0.4)
EOSINOPHIL NFR BLD AUTO: 4.8 % (ref 0.3–6.2)
ERYTHROCYTE [DISTWIDTH] IN BLOOD BY AUTOMATED COUNT: 12.2 % (ref 12.3–15.4)
GLUCOSE SERPL-MCNC: 101 MG/DL (ref 65–99)
HCT VFR BLD AUTO: 43.8 % (ref 34–46.6)
HGB BLD-MCNC: 14.6 G/DL (ref 12–15.9)
IMM GRANULOCYTES # BLD AUTO: 0.03 10*3/MM3 (ref 0–0.05)
IMM GRANULOCYTES NFR BLD AUTO: 0.5 % (ref 0–0.5)
LYMPHOCYTES # BLD AUTO: 2.62 10*3/MM3 (ref 0.7–3.1)
LYMPHOCYTES NFR BLD AUTO: 43.5 % (ref 19.6–45.3)
MCH RBC QN AUTO: 29.3 PG (ref 26.6–33)
MCHC RBC AUTO-ENTMCNC: 33.3 G/DL (ref 31.5–35.7)
MCV RBC AUTO: 88 FL (ref 79–97)
MONOCYTES # BLD AUTO: 0.58 10*3/MM3 (ref 0.1–0.9)
MONOCYTES NFR BLD AUTO: 9.6 % (ref 5–12)
NEUTROPHILS NFR BLD AUTO: 2.43 10*3/MM3 (ref 1.7–7)
NEUTROPHILS NFR BLD AUTO: 40.4 % (ref 42.7–76)
NRBC BLD AUTO-RTO: 0 /100 WBC (ref 0–0.2)
OLIGOCLONAL BANDS.IT SER+CSF QL: NORMAL
PLATELET # BLD AUTO: 201 10*3/MM3 (ref 140–450)
PMV BLD AUTO: 10.2 FL (ref 6–12)
POTASSIUM SERPL-SCNC: 4.5 MMOL/L (ref 3.5–5.2)
RBC # BLD AUTO: 4.98 10*6/MM3 (ref 3.77–5.28)
REAGIN AB CSF QL: NON REACTIVE
SODIUM SERPL-SCNC: 137 MMOL/L (ref 136–145)
WBC NRBC COR # BLD: 6.02 10*3/MM3 (ref 3.4–10.8)

## 2023-09-21 PROCEDURE — 80048 BASIC METABOLIC PNL TOTAL CA: CPT

## 2023-09-21 PROCEDURE — 25010000002 MORPHINE PER 10 MG: Performed by: INTERNAL MEDICINE

## 2023-09-21 PROCEDURE — 97535 SELF CARE MNGMENT TRAINING: CPT

## 2023-09-21 PROCEDURE — 25010000002 METOCLOPRAMIDE PER 10 MG

## 2023-09-21 PROCEDURE — 25010000002 ENOXAPARIN PER 10 MG: Performed by: INTERNAL MEDICINE

## 2023-09-21 PROCEDURE — 99232 SBSQ HOSP IP/OBS MODERATE 35: CPT | Performed by: NURSE PRACTITIONER

## 2023-09-21 PROCEDURE — G0378 HOSPITAL OBSERVATION PER HR: HCPCS

## 2023-09-21 PROCEDURE — 85025 COMPLETE CBC W/AUTO DIFF WBC: CPT

## 2023-09-21 RX ADMIN — LISINOPRIL 10 MG: 10 TABLET ORAL at 08:06

## 2023-09-21 RX ADMIN — DULOXETINE HYDROCHLORIDE 20 MG: 20 CAPSULE, DELAYED RELEASE ORAL at 08:06

## 2023-09-21 RX ADMIN — Medication 10 ML: at 06:41

## 2023-09-21 RX ADMIN — DOCUSATE SODIUM 50 MG AND SENNOSIDES 8.6 MG 2 TABLET: 8.6; 5 TABLET, FILM COATED ORAL at 08:06

## 2023-09-21 RX ADMIN — MORPHINE SULFATE 2 MG: 2 INJECTION, SOLUTION INTRAMUSCULAR; INTRAVENOUS at 13:12

## 2023-09-21 RX ADMIN — METOCLOPRAMIDE HYDROCHLORIDE 10 MG: 5 INJECTION INTRAMUSCULAR; INTRAVENOUS at 02:43

## 2023-09-21 RX ADMIN — MORPHINE SULFATE 2 MG: 2 INJECTION, SOLUTION INTRAMUSCULAR; INTRAVENOUS at 17:36

## 2023-09-21 RX ADMIN — MORPHINE SULFATE 2 MG: 2 INJECTION, SOLUTION INTRAMUSCULAR; INTRAVENOUS at 02:43

## 2023-09-21 RX ADMIN — DOCUSATE SODIUM 50 MG AND SENNOSIDES 8.6 MG 2 TABLET: 8.6; 5 TABLET, FILM COATED ORAL at 22:50

## 2023-09-21 RX ADMIN — METOCLOPRAMIDE HYDROCHLORIDE 10 MG: 5 INJECTION INTRAMUSCULAR; INTRAVENOUS at 08:06

## 2023-09-21 RX ADMIN — CYCLOBENZAPRINE HYDROCHLORIDE 10 MG: 10 TABLET, FILM COATED ORAL at 02:43

## 2023-09-21 RX ADMIN — METOCLOPRAMIDE HYDROCHLORIDE 10 MG: 5 INJECTION INTRAMUSCULAR; INTRAVENOUS at 20:38

## 2023-09-21 RX ADMIN — Medication 10 ML: at 08:13

## 2023-09-21 RX ADMIN — CYCLOBENZAPRINE HYDROCHLORIDE 10 MG: 10 TABLET, FILM COATED ORAL at 17:36

## 2023-09-21 RX ADMIN — METOCLOPRAMIDE HYDROCHLORIDE 10 MG: 5 INJECTION INTRAMUSCULAR; INTRAVENOUS at 13:15

## 2023-09-21 RX ADMIN — MORPHINE SULFATE 2 MG: 2 INJECTION, SOLUTION INTRAMUSCULAR; INTRAVENOUS at 06:43

## 2023-09-21 RX ADMIN — MORPHINE SULFATE 2 MG: 2 INJECTION, SOLUTION INTRAMUSCULAR; INTRAVENOUS at 22:50

## 2023-09-21 RX ADMIN — LISINOPRIL 10 MG: 10 TABLET ORAL at 22:49

## 2023-09-21 RX ADMIN — Medication 10 ML: at 20:38

## 2023-09-21 RX ADMIN — NICOTINE 1 PATCH: 21 PATCH, EXTENDED RELEASE TRANSDERMAL at 22:49

## 2023-09-21 NOTE — PLAN OF CARE
Goal Outcome Evaluation:              Outcome Evaluation: VSS. Pain managed with PRN pain medication and (1)bb dose flexeril. Pt rested well throughout the night. No s/s of acute distress.

## 2023-09-21 NOTE — NURSING NOTE
Reviewed chart with Dr. Bocanegra regarding inpatient rehab referral. Patient does not meet criteria for the Acute Rehab Unit. Case management notified.

## 2023-09-21 NOTE — THERAPY TREATMENT NOTE
Patient Name: Hanna Escalante  : 1965    MRN: [id number]                              Today's Date: 2023       Admit Date: 2023    Visit Dx:     ICD-10-CM ICD-9-CM   1. Weakness of left lower extremity  R29.898 729.89   2. Impaired functional mobility, balance, gait, and endurance [Z74.09 (ICD-10-CM)]  Z74.09 V49.89   3. Impaired mobility and ADLs [Z74.09, Z78.9 (ICD-10-CM)]  Z74.09 V49.89    Z78.9      Patient Active Problem List   Diagnosis    Screening for malignant neoplasm of colon    Neck pain    Tobacco dependence    Insomnia    Back pain    Lower extremity weakness    Depression    Adjustment disorder with depressed mood    Cannabis abuse    Positive urine drug screen for methamphetamine     Past Medical History:   Diagnosis Date    Allergic     Arthritis     COPD (chronic obstructive pulmonary disease)     GERD (gastroesophageal reflux disease)     Hypertension     Infectious viral hepatitis     Low back pain      Past Surgical History:   Procedure Laterality Date    CHOLECYSTECTOMY      COLONOSCOPY N/A 2020    Procedure: COLONOSCOPY;  Surgeon: Joshua Perry MD;  Location: [facility] ENDOSCOPY;  Service: General    HYSTERECTOMY        General Information       Row Name 23 1042          OT Time and Intention    Document Type therapy note (daily note)  -TO     Mode of Treatment occupational therapy;individual therapy  -TO       Row Name 23 104          General Information    Patient Profile Reviewed yes  -TO     Existing Precautions/Restrictions fall  -TO       Row Name 23 1042          Cognition    Orientation Status (Cognition) oriented x 4  -TO       Row Name 23 1042          Safety Issues, Functional Mobility    Impairments Affecting Function (Mobility) endurance/activity tolerance;coordination;balance;pain  -TO               User Key  (r) = Recorded By, (t) = Taken By, (c) = Cosigned By      Initials Name Provider Type    TO Issa Ventura COTA  Occupational Therapist Assistant                     Mobility/ADL's       Row Name 09/21/23 1042          Bed Mobility    Bed Mobility supine-sit;rolling right;rolling left  -TO     Rolling Left [place] (Bed Mobility) modified independence  -TO     Rolling Right [place] (Bed Mobility) modified independence  -TO     Supine-Sit [place] (Bed Mobility) modified independence  -TO     Assistive Device (Bed Mobility) head of bed elevated;bed rails  -TO       Row Name 09/21/23 1042          Transfers    Transfers sit-stand transfer;stand-sit transfer;toilet transfer  -TO       Row Name 09/21/23 1042          Sit-Stand Transfer    Sit-Stand [place] (Transfers) minimum assist (75% patient effort)  -TO     Assistive Device (Sit-Stand Transfers) walker, front-wheeled  -TO       Row Name 09/21/23 1042          Stand-Sit Transfer    Stand-Sit [place] (Transfers) minimum assist (75% patient effort)  -TO     Assistive Device (Stand-Sit Transfers) walker, front-wheeled  -TO       Row Name 09/21/23 1042          Toilet Transfer    Type (Toilet Transfer) sit-stand;stand-sit  -TO     [place] Level (Toilet Transfer) minimum assist (75% patient effort)  -TO     Assistive Device (Toilet Transfer) walker, front-wheeled  -TO       Row Name 09/21/23 1042          Functional Mobility    Functional Mobility- Ind. Level minimum assist (75% patient effort)  -TO     Functional Mobility- Device walker, front-wheeled  -TO       Row Name 09/21/23 1042          Activities of Daily Living    BADL Assessment/Intervention toileting  -TO       Row Name 09/21/23 1042          Lower Body Dressing Assessment/Training    [place] Level (Lower Body Dressing) lower body dressing skills;doff;don;socks;set up  -TO     Position (Lower Body Dressing) edge of bed sitting  -TO       Row Name 09/21/23 1042          Toileting Assessment/Training    [place] Level (Toileting) toileting skills;adjust/manage clothing;perform perineal  hygiene;contact guard assist  -TO     Position (Toileting) supported standing  -TO               User Key  (r) = Recorded By, (t) = Taken By, (c) = Cosigned By      Initials Name Provider Type    TO Issa Ventura COTA Occupational Therapist Assistant                   Obj/Interventions       Row Name 09/21/23 1042          Range of Motion Comprehensive    General Range of Motion bilateral lower extremity ROM WFL  -TO               User Key  (r) = Recorded By, (t) = Taken By, (c) = Cosigned By      Initials Name Provider Type    TO Issa Ventura COTA Occupational Therapist Assistant                   Goals/Plan       Row Name 09/21/23 1042          Transfer Goal 1 (OT)    Activity/Assistive Device (Transfer Goal 1, OT) toilet  -TO     [place] Level/Cues Needed (Transfer Goal 1, OT) supervision required  -TO     Time Frame (Transfer Goal 1, OT) long term goal (LTG);by discharge  -TO     Progress/Outcome (Transfer Goal 1, OT) goal not met  -TO       Row Name 09/21/23 1042          Bathing Goal 1 (OT)    Activity/Device (Bathing Goal 1, OT) lower body bathing  -TO     [place] Level/Cues Needed (Bathing Goal 1, OT) supervision required  -TO     Time Frame (Bathing Goal 1, OT) long term goal (LTG);by discharge  -TO     Progress/Outcomes (Bathing Goal 1, OT) goal not met  -TO       Row Name 09/21/23 1042          Dressing Goal 1 (OT)    Activity/Device (Dressing Goal 1, OT) lower body dressing  -TO     [place]/Cues Needed (Dressing Goal 1, OT) supervision required  -TO     Time Frame (Dressing Goal 1, OT) long term goal (LTG);by discharge  -TO     Progress/Outcome (Dressing Goal 1, OT) goal not met  -TO       Row Name 09/21/23 1042          Problem Specific Goal 1 (OT)    Problem Specific Goal 1 (OT) Pt will tolerate at least 4 continuous minutes of standing ADL with SPV or less and zero LOB for increased balance required for ADLs and mobility.  -TO     Time Frame (Problem Specific Goal 1, OT) long  term goal (LTG);by discharge  -TO     Progress/Outcome (Problem Specific Goal 1, OT) goal not met  -TO               User Key  (r) = Recorded By, (t) = Taken By, (c) = Cosigned By      Initials Name Provider Type    TO Issa Ventura COTA Occupational Therapist Assistant                   Clinical Impression       Row Name 09/21/23 1042          Pain Assessment    Pretreatment Pain Rating 5/10  -TO     Posttreatment Pain Rating 5/10  -TO       Row Name 09/21/23 1042          Plan of Care Review    Progress improving  -TO     Outcome Evaluation Pt agree to OT tx tis date. Pt Mod I for bed mobility, CGA for sit< > stand with min A for fx'al mobility EOB>toilet with RW. Pt CGA for toileting task of clothing manage =ment/hygiene . Pt performed fx'al mobility toilet>EOB min A with RW. Pt I to comb hair EOB. Pt mod I sit>sup with all needs in reach.  -TO       Row Name 09/21/23 1042          Therapy Assessment/Plan (OT)    Rehab Potential (OT) good, to achieve stated therapy goals  -TO     Criteria for Skilled Therapeutic Interventions Met (OT) yes;meets criteria  -TO     Therapy Frequency (OT) other (see comments)  5-7 d/wk  -TO       Row Name 09/21/23 1042          Therapy Plan Review/Discharge Plan (OT)    Anticipated Discharge Disposition (OT) other (see comments);skilled nursing facility;inpatient rehabilitation facility;home with assist;home with home health  rehab to home  -TO       Row Name 09/21/23 1042          Vital Signs    Pre Systolic BP Rehab 122  -TO     Pre Treatment Diastolic BP 61  -TO     Pretreatment Heart Rate (beats/min) 64  -TO     Pre SpO2 (%) 96  -TO     O2 Delivery Pre Treatment room air  -TO     Pre Patient Position Supine  -TO     Intra Patient Position Supine  -TO       Row Name 09/21/23 1042          Positioning and Restraints    Pre-Treatment Position in bed  -TO     Post Treatment Position bed  -TO     In Bed call light within reach;encouraged to call for assist;exit alarm on;supine  -TO                User Key  (r) = Recorded By, (t) = Taken By, (c) = Cosigned By      Initials Name Provider Type    TO Issa Ventura COTA Occupational Therapist Assistant                   Outcome Measures       Row Name 09/21/23 1042          How much help from another is currently needed...    Putting on and taking off regular lower body clothing? 3  -TO     Bathing (including washing, rinsing, and drying) 3  -TO     Toileting (which includes using toilet bed pan or urinal) 3  -TO     Putting on and taking off regular upper body clothing 4  -TO     Taking care of personal grooming (such as brushing teeth) 4  -TO     Eating meals 4  -TO     AM-PAC 6 Clicks Score (OT) 21  -TO       Row Name 09/21/23 0806          How much help from another person do you currently need...    Turning from your back to your side while in flat bed without using bedrails? 4  -CC     Moving from lying on back to sitting on the side of a flat bed without bedrails? 4  -CC     Moving to and from a bed to a chair (including a wheelchair)? 3  -CC     Standing up from a chair using your arms (e.g., wheelchair, bedside chair)? 3  -CC     Climbing 3-5 steps with a railing? 3  -CC     To walk in hospital room? 3  -CC     AM-PAC 6 Clicks Score (PT) 20  -CC     Highest level of mobility 6 --> Walked 10 steps or more  -CC               User Key  (r) = Recorded By, (t) = Taken By, (c) = Cosigned By      Initials Name Provider Type    TO Issa Ventura COTA Occupational Therapist Assistant    Shanon James RN Registered Nurse                    Occupational Therapy Education       Title: PT OT SLP Therapies (In Progress)       Topic: Occupational Therapy (In Progress)       Point: ADL training (Done)       Description:   Instruct learner(s) on proper safety adaptation and remediation techniques during self care or transfers.   Instruct in proper use of assistive devices.                  Learning Progress Summary             Patient  Acceptance, E,TB, VU by JOSE at 9/20/2023 5007    Comment: OT POC, Role of OT, d/c recommendations                         Point: Home exercise program (Not Started)       Description:   Instruct learner(s) on appropriate technique for monitoring, assisting and/or progressing therapeutic exercises/activities.                  Learner Progress:  Not documented in this visit.              Point: Precautions (Not Started)       Description:   Instruct learner(s) on prescribed precautions during self-care and functional transfers.                  Learner Progress:  Not documented in this visit.              Point: Body mechanics (Not Started)       Description:   Instruct learner(s) on proper positioning and spine alignment during self-care, functional mobility activities and/or exercises.                  Learner Progress:  Not documented in this visit.                              User Key       Initials Effective Dates Name Provider Type Discipline    JOSE 11/18/22 -  Perlita Rich OT Occupational Therapist OT                  OT Recommendation and Plan  Therapy Frequency (OT): other (see comments) (5-7 d/wk)  Plan of Care Review  Progress: improving  Outcome Evaluation: Pt agree to OT tx tis date. Pt Mod I for bed mobility, CGA for sit< > stand with min A for fx'al mobility EOB>toilet with RW. Pt CGA for toileting task of clothing manage =ment/hygiene . Pt performed fx'al mobility toilet>EOB min A with RW. Pt I to comb hair EOB. Pt mod I sit>sup with all needs in reach.     Time Calculation:         Time Calculation- OT       Row Name 09/21/23 1750             Time Calculation- OT    OT Start Time 1042  -TO      OT Stop Time 1055  -TO      OT Time Calculation (min) 13 min  -TO      Total Timed Code Minutes- OT 13 minute(s)  -TO      OT Received On 09/21/23  -TO         Timed Charges    70205 - OT Self Care/Mgmt Minutes 13  -TO         Total Minutes    Timed Charges Total Minutes 13  -TO       Total Minutes 13  -TO                 User Key  (r) = Recorded By, (t) = Taken By, (c) = Cosigned By      Initials Name Provider Type    TO Issa Ventura COTA Occupational Therapist Assistant                  Therapy Charges for Today       Code Description Service Date Service Provider Modifiers Qty    [id number] HC OT SELF CARE/MGMT/TRAIN EA 15 MIN 9/21/2023 Issa Ventura COTA GO 1                 LINDSEY Bragg  9/21/2023

## 2023-09-21 NOTE — PLAN OF CARE
Patient pleasant, sitting EOB eating breakfast. Denies any flank pain at this time. Lumbar puncture site CDI. VS stable. Denies any needs at this time. Will continue with POC.   Goal Outcome Evaluation:

## 2023-09-21 NOTE — PLAN OF CARE
Goal Outcome Evaluation:           Progress: improving  Outcome Evaluation: Pt agree to OT tx tis date. Pt Mod I for bed mobility, CGA for sit< > stand with min A for fx'al mobility EOB>toilet with RW. Pt CGA for toileting task of clothing manage =ment/hygiene . Pt performed fx'al mobility toilet>EOB min A with RW. Pt I to comb hair EOB. Pt mod I sit>sup with all needs in reach.      Anticipated Discharge Disposition (OT): other (see comments), skilled nursing facility, inpatient rehabilitation facility, home with assist, home with home health (rehab to home)

## 2023-09-21 NOTE — PROGRESS NOTES
[facility] Medicine Services  INPATIENT PROGRESS NOTE    Length of Stay: 0  Date of Admission: 9/17/2023  Primary Care Physician: Ninfa Bhatti APRN    Subjective   Chief Complaint: leg weakness  HPI:   History of Present Illness  Patient with past medical history of COPD, GERD, hypertension, arthritis presents with left lower extremity weakness.  Patient states that lower extremity weakness began 3 days ago.  Patient admits to history of substance abuse, and was reportedly positive for methamphetamines at admission. No loose of bowel or urine, no fever, no urinary symptoms or GI symptoms.  CT abdomen/pelvis shows no constipation, or acute abdominal signs.    As of today, 09/21/23  Review of Systems   Constitutional:  Negative for activity change, appetite change, chills, diaphoresis and fatigue.   HENT:  Negative for congestion, ear discharge, hearing loss, rhinorrhea, sinus pain, sneezing and sore throat.    Eyes:  Negative for photophobia, discharge and visual disturbance.   Respiratory:  Negative for cough, chest tightness, shortness of breath and wheezing.    Cardiovascular:  Negative for chest pain and palpitations.   Gastrointestinal:  Negative for abdominal distention, abdominal pain, blood in stool, diarrhea, nausea and vomiting.   Endocrine: Negative for cold intolerance, heat intolerance, polydipsia, polyphagia and polyuria.   Genitourinary:  Negative for dysuria, flank pain, hematuria and urgency.   Musculoskeletal:  Negative for arthralgias, joint swelling and myalgias.   Skin:  Negative for color change.   Allergic/Immunologic: Negative for food allergies.   Neurological:  Negative for dizziness, seizures, syncope, speech difficulty, weakness and headaches.   Hematological:  Negative for adenopathy. Does not bruise/bleed easily.   Psychiatric/Behavioral:  Negative for confusion, hallucinations, self-injury and suicidal ideas. The patient is not  nervous/anxious.       All pertinent negatives and positives are as above. All other systems have been reviewed and are negative unless otherwise stated.    Objective    Temp:  [98.2 °F (36.8 °C)-98.7 °F (37.1 °C)] 98.6 °F (37 °C)  Heart Rate:  [60-77] 68  Resp:  [18] 18  BP: (108-138)/(52-78) 122/61    AM-PAC 6 Clicks Score (PT): 20 (09/21/23 0806)    As of today, 09/21/23  Physical Exam  Constitutional:       Appearance: Normal appearance. She is obese.   HENT:      Head: Normocephalic and atraumatic.      Right Ear: Tympanic membrane normal.      Left Ear: Tympanic membrane normal.      Nose: Nose normal.      Mouth/Throat:      Mouth: Mucous membranes are moist.   Eyes:      Pupils: Pupils are equal, round, and reactive to light.   Cardiovascular:      Rate and Rhythm: Normal rate and regular rhythm.      Pulses: Normal pulses.      Heart sounds: Normal heart sounds.   Pulmonary:      Effort: Pulmonary effort is normal.      Breath sounds: Normal breath sounds.   Abdominal:      General: Abdomen is flat. Bowel sounds are normal.      Palpations: Abdomen is soft.   Musculoskeletal:         General: Normal range of motion.      Cervical back: Normal range of motion and neck supple.   Skin:     General: Skin is warm and dry.      Capillary Refill: Capillary refill takes less than 2 seconds.   Neurological:      General: No focal deficit present.      Mental Status: She is alert and oriented to person, place, and time.   Psychiatric:         Mood and Affect: Mood normal.         Behavior: Behavior normal.         Thought Content: Thought content normal.         Judgment: Judgment normal.         Results Review:  I have reviewed the labs, radiology results, and diagnostic studies.    Laboratory Data:   Results from last 7 days   Lab Units 09/21/23  0620 09/20/23  0511 09/19/23  0556 09/17/23  1843   SODIUM mmol/L 137 137 138 139   POTASSIUM mmol/L 4.5 4.1 4.0 4.2   CHLORIDE mmol/L 101 101 103 103   CO2 mmol/L 28.0  28.0 25.0 26.0   BUN mg/dL 25* 16 22* 22*   CREATININE mg/dL 0.98 0.92 0.88 1.05*   GLUCOSE mg/dL 101* 101* 107* 108*   CALCIUM mg/dL 9.3 8.9 8.8 9.0   BILIRUBIN mg/dL  --   --   --  0.5   ALK PHOS U/L  --   --   --  129*   ALT (SGPT) U/L  --   --   --  41*   AST (SGOT) U/L  --   --   --  37*   ANION GAP mmol/L 8.0 8.0 10.0 10.0     Estimated Creatinine Clearance: 55.4 mL/min (by C-G formula based on SCr of 0.98 mg/dL).  Results from last 7 days   Lab Units 09/19/23  0556 09/17/23  1843   MAGNESIUM mg/dL 1.9 2.0         Results from last 7 days   Lab Units 09/21/23  0620 09/20/23  0511 09/19/23  0556 09/18/23  0831 09/17/23  1843   WBC 10*3/mm3 6.02 6.43 5.78 6.91 8.17   HEMOGLOBIN g/dL 14.6 14.2 14.7 15.0 16.4*   HEMATOCRIT % 43.8 42.4 43.5 43.0 47.4*   PLATELETS 10*3/mm3 201 212 195 195 236           Culture Data:   No results found for: BLOODCX  No results found for: URINECX  No results found for: RESPCX  No results found for: WOUNDCX  No results found for: STOOLCX  No components found for: BODYFLD    Radiology Data:   Imaging Results (Last 24 Hours)       ** No results found for the last 24 hours. **            I have utilized all available immediate resources to obtain, update, or review the patient's current medications (including all prescriptions, over-the-counter products, herbals, cannabis/cannabidiol products, and vitamin/mineral/dietary (nutritional) supplements).       Assessment/Plan     Active Hospital Problems    Diagnosis     **Lower extremity weakness     Depression     Adjustment disorder with depressed mood     Cannabis abuse     Positive urine drug screen for methamphetamine          Assessment and   Plan:  57 years old female patient with chronic back pain, takes care of herself at home with some mobility problems, complaining of lower back pain and leg weakness, MRI spine showing myelitis at T4 - T8 rule out infectious vs demyelinating process, pending CSF results, arterial US legs  normal.  Left lower extremity weakness: MRI spine: Abnormal T2 hyperintensity within the dorsal column of the thoracic spinal cord from the T4 level through the T8 level.  No abnormal enhancement.  These findings are nonspecific.  This may represent a demyelinating process. An infectious/viral myelitis is also consideration.  This is not have the typical appearance of neoplasm which is considered unlikely.  Possibility of cord infarct is also a consideration. Tele neurology consulted and ordered LP,  CSF chemistry results normal, CSF meningitis panel normal, will follow tele neurology recommendations.     Suicidal ideation:  - Followed by DR Good     Polysubstance abuse:  - Patient positive for methamphetamines 9/13/2023.  Patient's urine drug screen negative for methamphetamines 9/17.  Recommend patient stop using recreational drugs.       Hypertension: Continue home medications    Medical Decision Making  Number and Complexity of problems: 3  Differential Diagnosis: myelitis    Conditions and Status:        Condition is improving.     [facility] Data  External documents reviewed: previous medical records  My EKG interpretation: sinus rhythm  My CT interpretation: MRI thoracic spine myelitis  Tests considered but not ordered: none     Decision rules/scores evaluated (example [id number], [place], etc): n/a     Discussed with: the patient     Treatment Plan  Will discuss with  rehab  Pending teleneurology feed back    Care Planning  Shared decision making: with the patient  Code status and discussions: full code    Disposition  Social Determinants of Health that impact treatment or disposition: none  I expect the patient to be discharged to rehab in 1 days.       I confirmed that the patient's Advance Care Plan is present, code status is documented, or surrogate decision maker is listed in the patient's medical record.      This document has been electronically signed by Edis Valero MD on September 21,  2023 11:12 CDT

## 2023-09-21 NOTE — PROGRESS NOTES
Stroke Progress Note       Chief Complaint:  LLE weakness    Subjective     HPI: Pt is a 57-yr-old right-handed white female with known diagnoses of hypertension, Substance abuse, and arthritis who presented on 9/17 with LLE weakness. She also c/o radicular pain to her LLE and LLE weakness, gait instability.   Neuro assessment was notable for LLE asymmetric hyperreflexia. Today she still has some LLE effort but can lift, states it's heavy and some numbness as well.       Review of Systems   HENT: Negative.     Eyes: Negative.    Respiratory: Negative.     Cardiovascular: Negative.    Gastrointestinal: Negative.    Genitourinary: Negative.    Musculoskeletal: Negative.    Skin: Negative.    Neurological:  Positive for numbness.   Psychiatric/Behavioral: Negative.        Objective      Temp:  [98.2 °F (36.8 °C)-98.7 °F (37.1 °C)] 98.6 °F (37 °C)  Heart Rate:  [60-77] 68  Resp:  [18] 18  BP: (108-138)/(52-78) 122/61    Neurological Exam  Mental Status  Alert. Oriented to person, place, time and situation. Speech is normal. Language is fluent with no aphasia.    Cranial Nerves  CN II: Visual acuity is normal. Visual fields full to confrontation.  CN III, IV, VI: Extraocular movements intact bilaterally. Normal lids and orbits bilaterally. Pupils equal round and reactive to light bilaterally.  CN V: Facial sensation is normal.  CN VII: Full and symmetric facial movement.  CN IX, X: Palate elevates symmetrically. Normal gag reflex.  CN XI: Shoulder shrug strength is normal.  CN XII: Tongue midline without atrophy or fasciculations.    Motor  Normal muscle bulk throughout. No fasciculations present. Strength is 5/5 in all four extremities except as noted.  LLE heaviness.    Sensory  Light touch abnormality: LLE.     Coordination    Finger-to-nose, rapid alternating movements and heel-to-shin normal bilaterally without dysmetria.    Gait    Not assessed.    Physical Exam  Constitutional:       Appearance: Normal appearance.    HENT:      Head: Normocephalic and atraumatic.   Eyes:      General: Lids are normal.      Extraocular Movements: Extraocular movements intact.      Pupils: Pupils are equal, round, and reactive to light.   Cardiovascular:      Rate and Rhythm: Normal rate.   Pulmonary:      Effort: Pulmonary effort is normal.   Musculoskeletal:         General: Normal range of motion.      Cervical back: Normal range of motion.   Skin:     General: Skin is warm and dry.   Neurological:      Mental Status: She is alert.      Motor: Weakness present.      Coordination: Coordination is intact.   Psychiatric:         Speech: Speech normal.       Results Review:    I reviewed the patient's new clinical results.    Lab Results (last 24 hours)       Procedure Component Value Units Date/Time    Basic Metabolic Panel [[id number]]  (Abnormal) Collected: 09/21/23 0620    Specimen: Blood Updated: 09/21/23 0649     Glucose 101 mg/dL      BUN 25 mg/dL      Creatinine 0.98 mg/dL      Sodium 137 mmol/L      Potassium 4.5 mmol/L      Chloride 101 mmol/L      CO2 28.0 mmol/L      Calcium 9.3 mg/dL      BUN/Creatinine Ratio 25.5     Anion Gap 8.0 mmol/L      eGFR 66.6 mL/min/1.73     Narrative:      GFR Normal >60  Chronic Kidney Disease <60  Kidney Failure <15      CBC & Differential [[id number]]  (Abnormal) Collected: 09/21/23 0620    Specimen: Blood Updated: 09/21/23 0624    Narrative:      The following orders were created for panel order CBC & Differential.  Procedure                               Abnormality         Status                     ---------                               -----------         ------                     CBC Auto Differential[[id number]]        Abnormal            Final result                 Please view results for these tests on the individual orders.    CBC Auto Differential [[id number]]  (Abnormal) Collected: 09/21/23 0620    Specimen: Blood Updated: 09/21/23 0624     WBC 6.02 10*3/mm3      RBC 4.98 10*6/mm3       Hemoglobin 14.6 g/dL      Hematocrit 43.8 %      MCV 88.0 fL      MCH 29.3 pg      MCHC 33.3 g/dL      RDW 12.2 %      RDW-SD 39.6 fl      MPV 10.2 fL      Platelets 201 10*3/mm3      Neutrophil % 40.4 %      Lymphocyte % 43.5 %      Monocyte % 9.6 %      Eosinophil % 4.8 %      Basophil % 1.2 %      Immature Grans % 0.5 %      Neutrophils, Absolute 2.43 10*3/mm3      Lymphocytes, Absolute 2.62 10*3/mm3      Monocytes, Absolute 0.58 10*3/mm3      Eosinophils, Absolute 0.29 10*3/mm3      Basophils, Absolute 0.07 10*3/mm3      Immature Grans, Absolute 0.03 10*3/mm3      nRBC 0.0 /100 WBC           IR LUMBAR PUNCTURE DIAGNOSTIC    Result Date: 9/19/2023  1. Successful fluoroscopic guided lumbar, as described above.          Assessment/Plan     Assessment/Plan:  Pt is a 57-yr-old right-handed white female with known diagnoses of hypertension, Substance abuse, and arthritis who presented on 9/17 with LLE weakness. She also c/o radicular pain to her LLE and LLE weakness, gait instability.   Neuro assessment was notable for LLE asymmetric hyperreflexia. Today she still has some LLE effort but can lift, states it's heavy and some numbness as well.   MRI brain, which showed no evidence of CVA, lumbar spine MRI was unremarkable. Cervical and thoracic MRI showed some signal in the posterior t-spine between T4-T8 which looks chronic but Lumbar Puncture was completed to assess for infection or any other inflammatory process. So far all CSF test have been neg and still a couple pending. Will follow up on those. For now she needs IRF and case management is working on placement. Vit D is low and recommend supplement. Will make a 4-6 week follow-up with neurology.         Lower extremity weakness    Depression    Adjustment disorder with depressed mood    Cannabis abuse    Positive urine drug screen for methamphetamine          MATEO Armendariz  09/21/23  11:46 CDT        I spent 40 minutes caring for Hanna Escalante  on this  date of service. This time includes time spent by me in the following activities: preparing for the visit, reviewing tests, obtaining and/or reviewing a separately obtained history, performing a medically appropriate examination and/or evaluation, counseling and educating the patient/family/caregiver, ordering medications, tests, or procedures, referring and communicating with other health care professionals, documenting information in the medical record, independently interpreting results and communicating that information with the patient/family/caregiver, and care coordination

## 2023-09-21 NOTE — DISCHARGE PLACEMENT REQUEST
"GardunoAdelina conradi Ann (57 y.o. Female)       Date of Birth   1965    Social Security Number       Address   [address] [facility] [address]    Home Phone   [phone]    MRN   [id number]       Anabaptism   Latter-day    Marital Status   Single                            Admission Date   9/17/23    Admission Type   Emergency    Admitting Provider   Collin Linares MD    Attending Provider   Edis Valero MD    Department, Room/Bed   [address], 432/1       Discharge Date       Discharge Disposition       Discharge Destination                                 Attending Provider: Edis Valero MD    Allergies: No Known Allergies    Isolation: None   Infection: None   Code Status: CPR    Ht: 154.9 cm (61\")   Wt: 70.3 kg (155 lb)    Admission Cmt: None   Principal Problem: Lower extremity weakness [R29.898]                   Active Insurance as of 9/17/2023       Primary Coverage       Payor Plan Insurance Group Employer/Plan Group    [facility] MEDICARE REPLACEMENT [facility] MEDICARE REPLACEMENT [id number]       Payor Plan Address Payor Plan Phone Number Payor Plan Fax Number Effective Dates    [address] [address]   1/1/2023 - None Entered    [facility] [id number]         Subscriber Name Subscriber Birth Date Member ID       TOSHIA GARDUNO ANN 1965 [id number]               Secondary Coverage       Payor Plan Insurance Group Employer/Plan Group    [place]        Payor Plan Address Payor Plan Phone Number Payor Plan Fax Number Effective Dates    [address] [phone]  4/7/2020 - None Entered    [facility] [id number]         Subscriber Name Subscriber Birth Date Member ID       TOSHIA GARDUNO ANN 1965 [id number]                     Emergency Contacts        (Rel.) Home Phone Work Phone Mobile Phone    CaseJustina (Sister) [phone] -- --    Jewell Garduno (Relative) [phone] -- [phone]    BRENT ANAND " "(Daughter) [phone] -- [phone]    \"Best Friend\",Jana (Friend) [phone] -- [phone]    GOVIND BURROWSDIE (Neighbor) [phone] -- --                 History & Physical        Collin Linares MD at 09/17/23 2240              [facility]  HISTORY AND PHYSICAL      Date of Admission: 9/17/2023  Primary Care Physician: Ninfa Bhatti APRN    Subjective     Chief Complaint: LLE weakness    History of Present Illness  Patient with past medical history of COPD, GERD, hypertension, arthritis presents with left lower extremity weakness.  Patient states that lower extremity weakness began 3 days ago.  Patient admits to history of substance abuse, and was reportedly positive for methamphetamines 3 days ago.  Patient seen in emergency room for left lower extremity weakness, and discharged home.  Patient states that left lower extremity weakness has not improved since original ED evaluation.  States pain is 10/10, focused on lower back, with cold chill like radiation to left gluteal region and then down left leg.  Patient also admits to feeling tingling sensation in left lower extremity due to leg pain.  States that leg pain is affecting ambulation.  Denies previous episodes of leg discomfort.  States that leg discomfort started after scratching her back with a \"fork\" 3 days ago.  Denies bowel/bladder dysfunction.  Patient has adequate rectal tone.  Denies that lower extremity weakness is affecting ambulation.  Also states she has not had a bowel movement since Tuesday.  CT abdomen/pelvis shows no constipation, or acute abdominal signs.    Patient has also admitted to suicidal ideation both to emergency room physician Dr. Mckeon and to admitting hospitalist Dr. Linares.  Patient denies plans for suicide, but does have suicidal thoughts acutely.        Review of Systems   Otherwise complete ROS reviewed and negative except as mentioned in the HPI.    Past Medical " "History:   Past Medical History:   Diagnosis Date    Allergic     Arthritis     COPD (chronic obstructive pulmonary disease)     GERD (gastroesophageal reflux disease)     Hypertension     Infectious viral hepatitis     Low back pain      Past Surgical History:  Past Surgical History:   Procedure Laterality Date    CHOLECYSTECTOMY      COLONOSCOPY N/A 1/21/2020    Procedure: COLONOSCOPY;  Surgeon: Joshua ePrry MD;  Location: [facility] ENDOSCOPY;  Service: General    HYSTERECTOMY       Social History:  reports that she has been smoking cigarettes. She has been smoking an average of 1 pack per day. She has never used smokeless tobacco. She reports that she does not currently use alcohol. She reports current drug use. Drug: Marijuana.    Family History: family history includes Cancer in her father; Diabetes in her father; Heart disease in her mother; Hypertension in her mother.        Allergies:  No Known Allergies    Medications:  Prior to Admission medications    Medication Sig Start Date End Date Taking? Authorizing Provider   DULoxetine (CYMBALTA) 20 MG capsule Take 1 capsule by mouth Daily.   Yes Emergency, Nurse Epic, RN   fluconazole (DIFLUCAN) 200 MG tablet Take one at the first sign of infection and may repeat in 3 days as needed. 4/7/20  Yes Luisa Hayes APRN   lisinopril (PRINIVIL,ZESTRIL) 10 MG tablet Take 1 tablet by mouth 2 (Two) Times a Day.   Yes Provider, MD Lianet     I have utilized all available immediate resources to obtain, update, and review the patient's current medications.    Objective     Vital Signs: /88 (BP Location: Left arm, Patient Position: Sitting)   Pulse 99   Temp 98 °F (36.7 °C) (Temporal)   Resp 16   Ht 154.9 cm (61\")   Wt 72.6 kg (160 lb)   SpO2 97%   BMI 30.23 kg/m²   Physical Exam  Constitutional:       Appearance: Normal appearance. She is normal weight.   HENT:      Head: Normocephalic and atraumatic.      Right Ear: Ear canal normal.      " Left Ear: Ear canal normal.      Nose: Nose normal.      Mouth/Throat:      Mouth: Mucous membranes are moist.      Pharynx: Oropharynx is clear.   Eyes:      Extraocular Movements: Extraocular movements intact.      Conjunctiva/sclera: Conjunctivae normal.      Pupils: Pupils are equal, round, and reactive to light.   Cardiovascular:      Rate and Rhythm: Normal rate and regular rhythm.      Pulses: Normal pulses.      Heart sounds: Normal heart sounds.   Pulmonary:      Effort: Pulmonary effort is normal.      Breath sounds: Normal breath sounds.   Abdominal:      General: Abdomen is flat. Bowel sounds are normal.      Palpations: Abdomen is soft.   Musculoskeletal:         General: Normal range of motion.      Cervical back: Normal range of motion and neck supple.   Skin:     General: Skin is warm.      Capillary Refill: Capillary refill takes less than 2 seconds.   Neurological:      Mental Status: She is alert and oriented to person, place, and time.      Motor: Weakness present.            Results Reviewed:  Lab Results (last 24 hours)       Procedure Component Value Units Date/Time    Extra Tubes [[id number]] Collected: 09/17/23 1843    Specimen: Blood Updated: 09/17/23 2246    Narrative:      The following orders were created for panel order Extra Tubes.  Procedure                               Abnormality         Status                     ---------                               -----------         ------                     Gold Top - RUST[[id number]]                                   Final result               Gray Top[[id number]]                                         Final result               Light Blue Top[[id number]]                                   Final result                 Please view results for these tests on the individual orders.    Gray Top [[id number]] Collected: 09/17/23 1843    Specimen: Blood Updated: 09/17/23 2246     Extra Tube Hold for add-ons.     Comment: Auto resulted.        Acetaminophen Level [[id number]]  (Normal) Collected: 09/17/23 1843    Specimen: Blood Updated: 09/17/23 2117     Acetaminophen <5.0 mcg/mL     Ethanol [[id number]] Collected: 09/17/23 1843    Specimen: Blood Updated: 09/17/23 2117     Ethanol <10 mg/dL      Ethanol % <0.010 %     Salicylate Level [[id number]]  (Normal) Collected: 09/17/23 1843    Specimen: Blood Updated: 09/17/23 2117     Salicylate <0.3 mg/dL     Gold Top - SST [[id number]] Collected: 09/17/23 1843    Specimen: Blood Updated: 09/17/23 1945     Extra Tube Hold for add-ons.     Comment: Auto resulted.       Light Blue Top [[id number]] Collected: 09/17/23 1843    Specimen: Blood Updated: 09/17/23 1945     Extra Tube Hold for add-ons.     Comment: Auto resulted       Fentanyl, Urine - Urine, Clean Catch [[id number]]  (Normal) Collected: 09/17/23 1847    Specimen: Urine, Clean Catch Updated: 09/17/23 1916     Fentanyl, Urine Negative    Narrative:      Negative Threshold:      Fentanyl 5 ng/mL     The normal value for the drug tested is negative. This report includes final unconfirmed screening results to be used for medical treatment purposes only. Unconfirmed results must not be used for non-medical purposes such as employment or legal testing. Clinical consideration should be applied to any drug of abuse test, particularly when unconfirmed results are used.           Magnesium [[id number]]  (Normal) Collected: 09/17/23 1843    Specimen: Blood Updated: 09/17/23 1912     Magnesium 2.0 mg/dL     Comprehensive Metabolic Panel [[id number]]  (Abnormal) Collected: 09/17/23 1843    Specimen: Blood Updated: 09/17/23 1912     Glucose 108 mg/dL      BUN 22 mg/dL      Creatinine 1.05 mg/dL      Sodium 139 mmol/L      Potassium 4.2 mmol/L      Chloride 103 mmol/L      CO2 26.0 mmol/L      Calcium 9.0 mg/dL      Total Protein 7.0 g/dL      Albumin 4.0 g/dL      ALT (SGPT) 41 U/L      AST (SGOT) 37 U/L      Alkaline Phosphatase 129 U/L      Total Bilirubin 0.5 mg/dL       Globulin 3.0 gm/dL      A/G Ratio 1.3 g/dL      BUN/Creatinine Ratio 21.0     Anion Gap 10.0 mmol/L      eGFR 61.3 mL/min/1.73     Narrative:      GFR Normal >60  Chronic Kidney Disease <60  Kidney Failure <15      CK [[id number]]  (Normal) Collected: 09/17/23 1843    Specimen: Blood Updated: 09/17/23 1912     Creatine Kinase 56 U/L     Urine Drug Screen - Urine, Clean Catch [[id number]]  (Abnormal) Collected: 09/17/23 1847    Specimen: Urine, Clean Catch Updated: 09/17/23 1909     THC, Screen, Urine Positive     Phencyclidine (PCP), Urine Negative     Cocaine Screen, Urine Negative     Methamphetamine, Ur Negative     Opiate Screen Negative     Amphetamine Screen, Urine Negative     Benzodiazepine Screen, Urine Negative     Tricyclic Antidepressants Screen Negative     Methadone Screen, Urine Negative     Barbiturates Screen, Urine Negative     Oxycodone Screen, Urine Negative     Propoxyphene Screen Negative     Buprenorphine, Screen, Urine Negative    Narrative:      Cutoff For Drugs Screened:    Amphetamines               500 ng/ml  Barbiturates               200 ng/ml  Benzodiazepines            150 ng/ml  Cocaine                    150 ng/ml  Methadone                  200 ng/ml  Opiates                    100 ng/ml  Phencyclidine               25 ng/ml  THC                            50 ng/ml  Methamphetamine            500 ng/ml  Tricyclic Antidepressants  300 ng/ml  Oxycodone                  100 ng/ml  Propoxyphene               300 ng/ml  Buprenorphine               10 ng/ml    The normal value for all drugs tested is negative. This report includes unconfirmed screening results, with the cutoff values listed, to be used for medical treatment purposes only.  Unconfirmed results must not be used for non-medical purposes such as employment or legal testing.  Clinical consideration should be applied to any drug of abuse test, particularly when unconfirmed results are used.      Urinalysis, Microscopic Only -  Urine, Clean Catch [[id number]]  (Abnormal) Collected: 09/17/23 1847    Specimen: Urine, Clean Catch Updated: 09/17/23 1859     RBC, UA 0-2 /HPF      WBC, UA 0-2 /HPF      Comment: Urine culture not indicated.        Bacteria, UA None Seen /HPF      Squamous Epithelial Cells, UA 0-2 /HPF      Hyaline Casts, UA None Seen /LPF      Methodology Automated Microscopy    Urinalysis With Culture If Indicated - Urine, Clean Catch [[id number]]  (Abnormal) Collected: 09/17/23 1847    Specimen: Urine, Clean Catch Updated: 09/17/23 1858     Color, UA Yellow     Appearance, UA Clear     pH, UA 5.5     Specific Gravity, UA 1.020     Glucose, UA Negative     Ketones, UA Negative     Bilirubin, UA Negative     Blood, UA Negative     Protein, UA Negative     Leuk Esterase, UA Trace     Nitrite, UA Negative     Urobilinogen, UA 1.0 E.U./dL    Narrative:      In absence of clinical symptoms, the presence of pyuria, bacteria, and/or nitrites on the urinalysis result does not correlate with infection.    CBC & Differential [[id number]]  (Abnormal) Collected: 09/17/23 1843    Specimen: Blood Updated: 09/17/23 1855    Narrative:      The following orders were created for panel order CBC & Differential.  Procedure                               Abnormality         Status                     ---------                               -----------         ------                     CBC Auto Differential[[id number]]        Abnormal            Final result                 Please view results for these tests on the individual orders.    CBC Auto Differential [[id number]]  (Abnormal) Collected: 09/17/23 1843    Specimen: Blood Updated: 09/17/23 1855     WBC 8.17 10*3/mm3      RBC 5.42 10*6/mm3      Hemoglobin 16.4 g/dL      Hematocrit 47.4 %      MCV 87.5 fL      MCH 30.3 pg      MCHC 34.6 g/dL      RDW 12.3 %      RDW-SD 39.1 fl      MPV 9.8 fL      Platelets 236 10*3/mm3      Neutrophil % 49.2 %      Lymphocyte % 37.9 %      Monocyte % 8.7 %       Eosinophil % 2.9 %      Basophil % 1.1 %      Immature Grans % 0.2 %      Neutrophils, Absolute 4.01 10*3/mm3      Lymphocytes, Absolute 3.10 10*3/mm3      Monocytes, Absolute 0.71 10*3/mm3      Eosinophils, Absolute 0.24 10*3/mm3      Basophils, Absolute 0.09 10*3/mm3      Immature Grans, Absolute 0.02 10*3/mm3      nRBC 0.0 /100 WBC           Imaging Results (Last 24 Hours)       Procedure Component Value Units Date/Time    CT Abdomen Pelvis Without Contrast [[id number]] Collected: 09/17/23 2057     Updated: 09/17/23 2102    Narrative:      CT ABDOMEN PELVIS WO CONTRAST    HISTORY: Abdominal pain, acute, nonlocalized    COMPARISON: CT abdomen pelvis 9/13/2023    Automated exposure control was also utilized to decrease patient radiation dose.    TECHNIQUE: Images of the abdomen and pelvis were obtained.  No IV contrast was  ministered.  Multiplanar reformatted images were provided for review.      FINDINGS:      Mild subsegmental atelectasis in the middle lobe.    Visualized mediastinal structures are normal.    Osseous structures are age-appropriate.    Liver is normal.  Spleen is unremarkable.  Bilateral kidneys are within normal limits.  No renal calculus or obstructive  uropathy.  Bilateral adrenal glands are normal.  Pancreas is normal.  Cholecystectomy.    No bowel obstruction.    The rectum is normal.  Bladder is normal.    No free air or free fluid.    No lymphadenopathy.          Impression:      1.  No acute inflammatory process throughout the abdomen or pelvis.        CT Head Without Contrast [[id number]] Collected: 09/17/23 1859     Updated: 09/17/23 1902    Narrative:      HEAD CT WITHOUT IV CONTRAST    HISTORY:  Unable to feel legs.    COMPARISON:  None.    TECHNIQUE:  Routine helical imaging through the brain with axial, coronal and sagittal  two-dimensional reformatted images.    FINDINGS:  There is no evidence of acute intracranial hemorrhage, mass effect or midline  shift. No abnormal extra-axial  fluid collection. No areas of abnormal  attenuation within the brain parenchyma to suggest an acute infarction. The  ventricles, cisterns, and sulci are within normal limits in size and  configuration. The visualized skull base structures and paranasal sinuses are  unremarkable. No calvarial fracture or other lesion.      Impression:      No acute intracranial process.          I have personally reviewed and interpreted the radiology studies and ECG obtained at time of admission.     Scheduled Meds:enoxaparin, 40 mg, Subcutaneous, Daily  nicotine, 1 patch, Transdermal, Q24H  senna-docusate sodium, 2 tablet, Oral, BID  sodium chloride, 10 mL, Intravenous, Q12H      Continuous Infusions:lactated ringers, 100 mL/hr      PRN Meds:.  acetaminophen **OR** acetaminophen **OR** acetaminophen    senna-docusate sodium **AND** polyethylene glycol **AND** bisacodyl **AND** bisacodyl    calcium carbonate    Calcium Replacement - Follow Nurse / BPA Driven Protocol    cyclobenzaprine    Magnesium Low Dose Replacement - Follow Nurse / BPA Driven Protocol    melatonin    Morphine **AND** naloxone    Morphine **AND** naloxone    nitroglycerin    ondansetron **OR** ondansetron    Phosphorus Replacement - Follow Nurse / BPA Driven Protocol    Potassium Replacement - Follow Nurse / BPA Driven Protocol    sodium chloride    sodium chloride    SUMAtriptan   Assessment / Plan     Assessment:   Active Hospital Problems    Diagnosis     **Lower extremity weakness      Plan:   Left lower extremity weakness x3 days:  - Given severity of symptoms, need to rule out spinal nerve root compression and/or CVA.  We will therefore order MRI brain, and MRI lumbar spine.  PT OT evaluation during hospitalization.  As needed pain meds.  Start Flexeril 3 times daily.    Suicidal ideation:  - Patient admitted to both Dr. Mckeon emergency room physician and admitting hospitalist Dr. Linares that she suffers from suicidal ideation.  We will therefore place  patient on one-to-one sitter, and order psych consultation in a.m.    Polysubstance abuse:  - Patient positive for methamphetamines 9/13/2023.  Patient's urine drug screen negative for methamphetamines 9/17.  Recommend patient stop using recreational drugs.  Polysubstance abuse anticipatory guidance given by Dr. Linares at time of admission.    Hypertension: Continue home medications    Medical Decision Making  Number and Complexity of problems: See above   Differential Diagnosis: See above    Conditions and Status:        Condition is improving.    I have utilized all available immediate resources to obtain, update, or review the patient's current medications (including all prescriptions, over-the-counter products, herbals, cannabis/cannabidiol products, and vitamin/mineral/dietary (nutritional) supplements).      MDM Data  External documents reviewed: Up-to-date, care everywhere  My EKG interpretation:    My CT interpretation: No  acute findings  Tests considered but not ordered:       Decision rules/scores evaluated (example [id number], Wells, etc):       Discussed with: Patient, nursing staff     Treatment Plan  The above    Care Planning  Shared decision making: Patient, nursing staff, Dr. Linares  Code status and discussions: Full    Disposition  Social Determinants of Health that impact treatment or disposition: None  I expect the patient to be discharged to home or inpatient psychiatry in 2-4 days.     The patient was seen and examined by me on 9/17/2023 at home 2040.    Electronically signed by Collin Linares MD, 09/18/23, 02:35 CDT.               Electronically signed by Collin Linares MD at 09/19/23 0105          Physician Progress Notes (last 24 hours)        Justo Avina APRN at 09/21/23 1146          Stroke Progress Note       Chief Complaint:  LLE weakness    Subjective     HPI: Pt is a 57-yr-old right-handed white female with known diagnoses of hypertension, Substance abuse, and arthritis who  presented on 9/17 with LLE weakness. She also c/o radicular pain to her LLE and LLE weakness, gait instability.   Neuro assessment was notable for LLE asymmetric hyperreflexia. Today she still has some LLE effort but can lift, states it's heavy and some numbness as well.       Review of Systems   HENT: Negative.     Eyes: Negative.    Respiratory: Negative.     Cardiovascular: Negative.    Gastrointestinal: Negative.    Genitourinary: Negative.    Musculoskeletal: Negative.    Skin: Negative.    Neurological:  Positive for numbness.   Psychiatric/Behavioral: Negative.        Objective      Temp:  [98.2 °F (36.8 °C)-98.7 °F (37.1 °C)] 98.6 °F (37 °C)  Heart Rate:  [60-77] 68  Resp:  [18] 18  BP: (108-138)/(52-78) 122/61    Neurological Exam  Mental Status  Alert. Oriented to person, place, time and situation. Speech is normal. Language is fluent with no aphasia.    Cranial Nerves  CN II: Visual acuity is normal. Visual fields full to confrontation.  CN III, IV, VI: Extraocular movements intact bilaterally. Normal lids and orbits bilaterally. Pupils equal round and reactive to light bilaterally.  CN V: Facial sensation is normal.  CN VII: Full and symmetric facial movement.  CN IX, X: Palate elevates symmetrically. Normal gag reflex.  CN XI: Shoulder shrug strength is normal.  CN XII: Tongue midline without atrophy or fasciculations.    Motor  Normal muscle bulk throughout. No fasciculations present. Strength is 5/5 in all four extremities except as noted.  LLE heaviness.    Sensory  Light touch abnormality: LLE.     Coordination    Finger-to-nose, rapid alternating movements and heel-to-shin normal bilaterally without dysmetria.    Gait    Not assessed.    Physical Exam  Constitutional:       Appearance: Normal appearance.   HENT:      Head: Normocephalic and atraumatic.   Eyes:      General: Lids are normal.      Extraocular Movements: Extraocular movements intact.      Pupils: Pupils are equal, round, and reactive  to light.   Cardiovascular:      Rate and Rhythm: Normal rate.   Pulmonary:      Effort: Pulmonary effort is normal.   Musculoskeletal:         General: Normal range of motion.      Cervical back: Normal range of motion.   Skin:     General: Skin is warm and dry.   Neurological:      Mental Status: She is alert.      Motor: Weakness present.      Coordination: Coordination is intact.   Psychiatric:         Speech: Speech normal.       Results Review:    I reviewed the patient's new clinical results.    Lab Results (last 24 hours)       Procedure Component Value Units Date/Time    Basic Metabolic Panel [[id number]]  (Abnormal) Collected: 09/21/23 0620    Specimen: Blood Updated: 09/21/23 0649     Glucose 101 mg/dL      BUN 25 mg/dL      Creatinine 0.98 mg/dL      Sodium 137 mmol/L      Potassium 4.5 mmol/L      Chloride 101 mmol/L      CO2 28.0 mmol/L      Calcium 9.3 mg/dL      BUN/Creatinine Ratio 25.5     Anion Gap 8.0 mmol/L      eGFR 66.6 mL/min/1.73     Narrative:      GFR Normal >60  Chronic Kidney Disease <60  Kidney Failure <15      CBC & Differential [[id number]]  (Abnormal) Collected: 09/21/23 0620    Specimen: Blood Updated: 09/21/23 0624    Narrative:      The following orders were created for panel order CBC & Differential.  Procedure                               Abnormality         Status                     ---------                               -----------         ------                     CBC Auto Differential[[id number]]        Abnormal            Final result                 Please view results for these tests on the individual orders.    CBC Auto Differential [[id number]]  (Abnormal) Collected: 09/21/23 0620    Specimen: Blood Updated: 09/21/23 0624     WBC 6.02 10*3/mm3      RBC 4.98 10*6/mm3      Hemoglobin 14.6 g/dL      Hematocrit 43.8 %      MCV 88.0 fL      MCH 29.3 pg      MCHC 33.3 g/dL      RDW 12.2 %      RDW-SD 39.6 fl      MPV 10.2 fL      Platelets 201 10*3/mm3      Neutrophil % 40.4  %      Lymphocyte % 43.5 %      Monocyte % 9.6 %      Eosinophil % 4.8 %      Basophil % 1.2 %      Immature Grans % 0.5 %      Neutrophils, Absolute 2.43 10*3/mm3      Lymphocytes, Absolute 2.62 10*3/mm3      Monocytes, Absolute 0.58 10*3/mm3      Eosinophils, Absolute 0.29 10*3/mm3      Basophils, Absolute 0.07 10*3/mm3      Immature Grans, Absolute 0.03 10*3/mm3      nRBC 0.0 /100 WBC           IR LUMBAR PUNCTURE DIAGNOSTIC    Result Date: 9/19/2023  1. Successful fluoroscopic guided lumbar, as described above.          Assessment/Plan     Assessment/Plan:  Pt is a 57-yr-old right-handed white female with known diagnoses of hypertension, Substance abuse, and arthritis who presented on 9/17 with LLE weakness. She also c/o radicular pain to her LLE and LLE weakness, gait instability.   Neuro assessment was notable for LLE asymmetric hyperreflexia. Today she still has some LLE effort but can lift, states it's heavy and some numbness as well.   MRI brain, which showed no evidence of CVA, lumbar spine MRI was unremarkable. Cervical and thoracic MRI showed some signal in the posterior t-spine between T4-T8 which looks chronic but Lumbar Puncture was completed to assess for infection or any other inflammatory process. So far all CSF test have been neg and still a couple pending. Will follow up on those. For now she needs IRF and case management is working on placement. Vit D is low and recommend supplement. Will make a 4-6 week follow-up with neurology.         Lower extremity weakness    Depression    Adjustment disorder with depressed mood    Cannabis abuse    Positive urine drug screen for methamphetamine          MATEO Armendariz  09/21/23  11:46 CDT        I spent 40 minutes caring for Hanna Escalante  on this date of service. This time includes time spent by me in the following activities: preparing for the visit, reviewing tests, obtaining and/or reviewing a separately obtained history, performing a medically  appropriate examination and/or evaluation, counseling and educating the patient/family/caregiver, ordering medications, tests, or procedures, referring and communicating with other health care professionals, documenting information in the medical record, independently interpreting results and communicating that information with the patient/family/caregiver, and care coordination    Electronically signed by Justo Avina APRN at 09/21/23 1594       Edis Valero MD at 09/21/23 1112              [facility] Medicine Services  INPATIENT PROGRESS NOTE    Length of Stay: 0  Date of Admission: 9/17/2023  Primary Care Physician: Ninfa Bhatti APRN    Subjective   Chief Complaint: leg weakness  HPI:   History of Present Illness  Patient with past medical history of COPD, GERD, hypertension, arthritis presents with left lower extremity weakness.  Patient states that lower extremity weakness began 3 days ago.  Patient admits to history of substance abuse, and was reportedly positive for methamphetamines at admission. No loose of bowel or urine, no fever, no urinary symptoms or GI symptoms.  CT abdomen/pelvis shows no constipation, or acute abdominal signs.    As of today, 09/21/23  Review of Systems   Constitutional:  Negative for activity change, appetite change, chills, diaphoresis and fatigue.   HENT:  Negative for congestion, ear discharge, hearing loss, rhinorrhea, sinus pain, sneezing and sore throat.    Eyes:  Negative for photophobia, discharge and visual disturbance.   Respiratory:  Negative for cough, chest tightness, shortness of breath and wheezing.    Cardiovascular:  Negative for chest pain and palpitations.   Gastrointestinal:  Negative for abdominal distention, abdominal pain, blood in stool, diarrhea, nausea and vomiting.   Endocrine: Negative for cold intolerance, heat intolerance, polydipsia, polyphagia and polyuria.   Genitourinary:  Negative for  dysuria, flank pain, hematuria and urgency.   Musculoskeletal:  Negative for arthralgias, joint swelling and myalgias.   Skin:  Negative for color change.   Allergic/Immunologic: Negative for food allergies.   Neurological:  Negative for dizziness, seizures, syncope, speech difficulty, weakness and headaches.   Hematological:  Negative for adenopathy. Does not bruise/bleed easily.   Psychiatric/Behavioral:  Negative for confusion, hallucinations, self-injury and suicidal ideas. The patient is not nervous/anxious.       All pertinent negatives and positives are as above. All other systems have been reviewed and are negative unless otherwise stated.    Objective    Temp:  [98.2 °F (36.8 °C)-98.7 °F (37.1 °C)] 98.6 °F (37 °C)  Heart Rate:  [60-77] 68  Resp:  [18] 18  BP: (108-138)/(52-78) 122/61    AM-PAC 6 Clicks Score (PT): 20 (09/21/23 0806)    As of today, 09/21/23  Physical Exam  Constitutional:       Appearance: Normal appearance. She is obese.   HENT:      Head: Normocephalic and atraumatic.      Right Ear: Tympanic membrane normal.      Left Ear: Tympanic membrane normal.      Nose: Nose normal.      Mouth/Throat:      Mouth: Mucous membranes are moist.   Eyes:      Pupils: Pupils are equal, round, and reactive to light.   Cardiovascular:      Rate and Rhythm: Normal rate and regular rhythm.      Pulses: Normal pulses.      Heart sounds: Normal heart sounds.   Pulmonary:      Effort: Pulmonary effort is normal.      Breath sounds: Normal breath sounds.   Abdominal:      General: Abdomen is flat. Bowel sounds are normal.      Palpations: Abdomen is soft.   Musculoskeletal:         General: Normal range of motion.      Cervical back: Normal range of motion and neck supple.   Skin:     General: Skin is warm and dry.      Capillary Refill: Capillary refill takes less than 2 seconds.   Neurological:      General: No focal deficit present.      Mental Status: She is alert and oriented to person, place, and time.    Psychiatric:         Mood and Affect: Mood normal.         Behavior: Behavior normal.         Thought Content: Thought content normal.         Judgment: Judgment normal.         Results Review:  I have reviewed the labs, radiology results, and diagnostic studies.    Laboratory Data:   Results from last 7 days   Lab Units 09/21/23  0620 09/20/23  0511 09/19/23  0556 09/17/23  1843   SODIUM mmol/L 137 137 138 139   POTASSIUM mmol/L 4.5 4.1 4.0 4.2   CHLORIDE mmol/L 101 101 103 103   CO2 mmol/L 28.0 28.0 25.0 26.0   BUN mg/dL 25* 16 22* 22*   CREATININE mg/dL 0.98 0.92 0.88 1.05*   GLUCOSE mg/dL 101* 101* 107* 108*   CALCIUM mg/dL 9.3 8.9 8.8 9.0   BILIRUBIN mg/dL  --   --   --  0.5   ALK PHOS U/L  --   --   --  129*   ALT (SGPT) U/L  --   --   --  41*   AST (SGOT) U/L  --   --   --  37*   ANION GAP mmol/L 8.0 8.0 10.0 10.0     Estimated Creatinine Clearance: 55.4 mL/min (by C-G formula based on SCr of 0.98 mg/dL).  Results from last 7 days   Lab Units 09/19/23  0556 09/17/23  1843   MAGNESIUM mg/dL 1.9 2.0         Results from last 7 days   Lab Units 09/21/23  0620 09/20/23  0511 09/19/23  0556 09/18/23  0831 09/17/23  1843   WBC 10*3/mm3 6.02 6.43 5.78 6.91 8.17   HEMOGLOBIN g/dL 14.6 14.2 14.7 15.0 16.4*   HEMATOCRIT % 43.8 42.4 43.5 43.0 47.4*   PLATELETS 10*3/mm3 201 212 195 195 236           Culture Data:   No results found for: BLOODCX  No results found for: URINECX  No results found for: RESPCX  No results found for: WOUNDCX  No results found for: STOOLCX  No components found for: BODYFLD    Radiology Data:   Imaging Results (Last 24 Hours)       ** No results found for the last 24 hours. **            I have utilized all available immediate resources to obtain, update, or review the patient's current medications (including all prescriptions, over-the-counter products, herbals, cannabis/cannabidiol products, and vitamin/mineral/dietary (nutritional) supplements).       Assessment/Plan     Active Hospital  Problems    Diagnosis     **Lower extremity weakness     Depression     Adjustment disorder with depressed mood     Cannabis abuse     Positive urine drug screen for methamphetamine          Assessment and   Plan:  57 years old female patient with chronic back pain, takes care of herself at home with some mobility problems, complaining of lower back pain and leg weakness, MRI spine showing myelitis at T4 - T8 rule out infectious vs demyelinating process, pending CSF results, arterial US legs normal.  Left lower extremity weakness: MRI spine: Abnormal T2 hyperintensity within the dorsal column of the thoracic spinal cord from the T4 level through the T8 level.  No abnormal enhancement.  These findings are nonspecific.  This may represent a demyelinating process. An infectious/viral myelitis is also consideration.  This is not have the typical appearance of neoplasm which is considered unlikely.  Possibility of cord infarct is also a consideration. Tele neurology consulted and ordered LP,  CSF chemistry results normal, CSF meningitis panel normal, will follow tele neurology recommendations.     Suicidal ideation:  - Followed by DR Good     Polysubstance abuse:  - Patient positive for methamphetamines 9/13/2023.  Patient's urine drug screen negative for methamphetamines 9/17.  Recommend patient stop using recreational drugs.       Hypertension: Continue home medications    Medical Decision Making  Number and Complexity of problems: 3  Differential Diagnosis: myelitis    Conditions and Status:        Condition is improving.     [facility] Data  External documents reviewed: previous medical records  My EKG interpretation: sinus rhythm  My CT interpretation: MRI thoracic spine myelitis  Tests considered but not ordered: none     Decision rules/scores evaluated (example [id number], Wells, etc): n/a     Discussed with: the patient     Treatment Plan  Will discuss with  rehab  Pending teleneurology feed  back    Care Planning  Shared decision making: with the patient  Code status and discussions: full code    Disposition  Social Determinants of Health that impact treatment or disposition: none  I expect the patient to be discharged to rehab in 1 days.       I confirmed that the patient's Advance Care Plan is present, code status is documented, or surrogate decision maker is listed in the patient's medical record.      This document has been electronically signed by Edis Valero MD on September 21, 2023 11:12 CDT      Electronically signed by Edis Valero MD at 09/21/23 1120       Edis Valero MD at 09/20/23 8478              [facility] Medicine Services  INPATIENT PROGRESS NOTE    Length of Stay: 0  Date of Admission: 9/17/2023  Primary Care Physician: Ninfa Bhatti APRN    Subjective   Chief Complaint: none today  HPI:  History of Present Illness  Patient with past medical history of COPD, GERD, hypertension, arthritis presents with left lower extremity weakness.  Patient states that lower extremity weakness began 3 days ago.  Patient admits to history of substance abuse, and was reportedly positive for methamphetamines at admission. No loose of bowel or urine, no fever, no urinary symptoms or GI symptoms.  CT abdomen/pelvis shows no constipation, or acute abdominal signs.    As of today, 09/20/23  Review of Systems   Constitutional:  Negative for activity change, appetite change, chills, diaphoresis and fatigue.   HENT:  Negative for congestion, ear discharge, hearing loss, rhinorrhea, sinus pain, sneezing and sore throat.    Eyes:  Negative for photophobia, discharge and visual disturbance.   Respiratory:  Negative for cough, chest tightness, shortness of breath and wheezing.    Cardiovascular:  Negative for chest pain and palpitations.   Gastrointestinal:  Negative for abdominal distention, abdominal pain, blood in stool, diarrhea, nausea and vomiting.    Endocrine: Negative for cold intolerance, heat intolerance, polydipsia, polyphagia and polyuria.   Genitourinary:  Negative for dysuria, flank pain, hematuria and urgency.   Musculoskeletal:  Negative for arthralgias, joint swelling and myalgias.   Skin:  Negative for color change.   Allergic/Immunologic: Negative for food allergies.   Neurological:  Negative for dizziness, seizures, syncope, speech difficulty, weakness and headaches.   Hematological:  Negative for adenopathy. Does not bruise/bleed easily.   Psychiatric/Behavioral:  Negative for confusion, hallucinations, self-injury and suicidal ideas. The patient is not nervous/anxious.       All pertinent negatives and positives are as above. All other systems have been reviewed and are negative unless otherwise stated.    Objective    Temp:  [97.8 °F (36.6 °C)-98.7 °F (37.1 °C)] 98.7 °F (37.1 °C)  Heart Rate:  [66-85] 71  Resp:  [16-18] 18  BP: (128-169)/(73-85) 138/73    AM-PAC 6 Clicks Score (PT): 20 (09/20/23 1022)    As of today, 09/20/23  Physical Exam  Constitutional:       Appearance: Normal appearance.   HENT:      Head: Normocephalic and atraumatic.      Right Ear: Tympanic membrane normal.      Left Ear: Tympanic membrane normal.      Nose: Nose normal.      Mouth/Throat:      Mouth: Mucous membranes are moist.   Eyes:      Pupils: Pupils are equal, round, and reactive to light.   Cardiovascular:      Rate and Rhythm: Normal rate and regular rhythm.      Pulses: Normal pulses.      Heart sounds: Normal heart sounds.   Pulmonary:      Effort: Pulmonary effort is normal.      Breath sounds: Normal breath sounds.   Abdominal:      General: Abdomen is flat. Bowel sounds are normal.      Palpations: Abdomen is soft.   Musculoskeletal:         General: Normal range of motion.      Cervical back: Normal range of motion and neck supple.   Skin:     General: Skin is warm and dry.      Capillary Refill: Capillary refill takes less than 2 seconds.    Neurological:      General: No focal deficit present.      Mental Status: She is alert and oriented to person, place, and time.   Psychiatric:         Mood and Affect: Mood normal.         Behavior: Behavior normal.         Thought Content: Thought content normal.         Judgment: Judgment normal.         Results Review:  I have reviewed the labs, radiology results, and diagnostic studies.    Laboratory Data:   Results from last 7 days   Lab Units 09/20/23  0511 09/19/23  0556 09/17/23  1843   SODIUM mmol/L 137 138 139   POTASSIUM mmol/L 4.1 4.0 4.2   CHLORIDE mmol/L 101 103 103   CO2 mmol/L 28.0 25.0 26.0   BUN mg/dL 16 22* 22*   CREATININE mg/dL 0.92 0.88 1.05*   GLUCOSE mg/dL 101* 107* 108*   CALCIUM mg/dL 8.9 8.8 9.0   BILIRUBIN mg/dL  --   --  0.5   ALK PHOS U/L  --   --  129*   ALT (SGPT) U/L  --   --  41*   AST (SGOT) U/L  --   --  37*   ANION GAP mmol/L 8.0 10.0 10.0     Estimated Creatinine Clearance: 58.8 mL/min (by C-G formula based on SCr of 0.92 mg/dL).  Results from last 7 days   Lab Units 09/19/23  0556 09/17/23  1843   MAGNESIUM mg/dL 1.9 2.0         Results from last 7 days   Lab Units 09/20/23  0511 09/19/23  0556 09/18/23  0831 09/17/23  1843   WBC 10*3/mm3 6.43 5.78 6.91 8.17   HEMOGLOBIN g/dL 14.2 14.7 15.0 16.4*   HEMATOCRIT % 42.4 43.5 43.0 47.4*   PLATELETS 10*3/mm3 212 195 195 236           Culture Data:   No results found for: BLOODCX  No results found for: URINECX  No results found for: RESPCX  No results found for: WOUNDCX  No results found for: STOOLCX  No components found for: BODYFLD    Radiology Data:   Imaging Results (Last 24 Hours)       ** No results found for the last 24 hours. **            I have utilized all available immediate resources to obtain, update, or review the patient's current medications (including all prescriptions, over-the-counter products, herbals, cannabis/cannabidiol products, and vitamin/mineral/dietary (nutritional) supplements).       Assessment/Plan      Active Hospital Problems    Diagnosis     **Lower extremity weakness     Depression     Adjustment disorder with depressed mood     Cannabis abuse     Positive urine drug screen for methamphetamine          Assessment and   Plan:  57 years old female patient with chronic back pain, takes care of herself at home with some mobility problems, complaining of lower back pain and leg weakness, MRI spine showing myelitis at T4 - T8 rule out infectious vs demyelinating process, pending CSF results, arterial US legs normal.  Left lower extremity weakness: MRI spine: Abnormal T2 hyperintensity within the dorsal column of the thoracic spinal cord from the T4 level through the T8 level.  No abnormal enhancement.  These findings are nonspecific.  This may represent a demyelinating process. An infectious/viral myelitis is also consideration.  This is not have the typical appearance of neoplasm which is considered unlikely.  Possibility of cord infarct is also a consideration. Tele neurology consulted and ordered LP, pending CSF results, CSF appears normal.     Suicidal ideation:  - Followed by DR Good     Polysubstance abuse:  - Patient positive for methamphetamines 9/13/2023.  Patient's urine drug screen negative for methamphetamines 9/17.  Recommend patient stop using recreational drugs.       Hypertension: Continue home medications    Medical Decision Making  Number and Complexity of problems: 3  Differential Diagnosis: myelitis    Conditions and Status:        Condition is improving.     [facility] Data  External documents reviewed: previous medical records  My EKG interpretation: sinus rhythm  My CT interpretation: MRI spine thoracic spine myelitis  Tests considered but not ordered: none     Decision rules/scores evaluated (example [id number], Wells, etc): n/a     Discussed with: patient     Treatment Plan  Pending CSF results    Care Planning  Shared decision making: with the patient  Code status and discussions: full  code    Disposition  Social Determinants of Health that impact treatment or disposition: none  I expect the patient to be discharged to rehab in 5 days.       I confirmed that the patient's Advance Care Plan is present, code status is documented, or surrogate decision maker is listed in the patient's medical record.      This document has been electronically signed by Edis Valero MD on 2023 17:23 CDT      Electronically signed by Edis Valero MD at 23 1728       Physical Therapy Notes (last 24 hours)  Notes from 23 1403 through 23 1403   No notes exist for this encounter.          Occupational Therapy Notes (last 24 hours)        Perlita Rich OT at 23 1528          Patient Name: Hanna Escalante  : 1965    MRN: [id number]                              Today's Date: 2023       Admit Date: 2023    Visit Dx:     ICD-10-CM ICD-9-CM   1. Weakness of left lower extremity  R29.898 729.89   2. Impaired functional mobility, balance, gait, and endurance [Z74.09 (ICD-10-CM)]  Z74.09 V49.89   3. Impaired mobility and ADLs [Z74.09, Z78.9 (ICD-10-CM)]  Z74.09 V49.89    Z78.9      Patient Active Problem List   Diagnosis    Screening for malignant neoplasm of colon    Neck pain    Tobacco dependence    Insomnia    Back pain    Lower extremity weakness    Depression    Adjustment disorder with depressed mood    Cannabis abuse    Positive urine drug screen for methamphetamine     Past Medical History:   Diagnosis Date    Allergic     Arthritis     COPD (chronic obstructive pulmonary disease)     GERD (gastroesophageal reflux disease)     Hypertension     Infectious viral hepatitis     Low back pain      Past Surgical History:   Procedure Laterality Date    CHOLECYSTECTOMY      COLONOSCOPY N/A 2020    Procedure: COLONOSCOPY;  Surgeon: Joshua Perry MD;  Location: [facility] ENDOSCOPY;  Service: General    HYSTERECTOMY        General Information       Row Name  09/20/23 1021          OT Time and Intention    Document Type evaluation  -CM     Mode of Treatment physical therapy;occupational therapy  -CM       Row Name 09/20/23 1021          General Information    Patient Profile Reviewed yes  -CM     Prior Level of Function independent:;all household mobility;community mobility;ADL's  -CM     Existing Precautions/Restrictions fall  -CM     Barriers to Rehab medically complex  -CM       Row Name 09/20/23 1021          Living Environment    People in Home alone  -CM       Row Name 09/20/23 1021          Home Main Entrance    Number of Stairs, Main Entrance one  -CM     Stair Railings, Main Entrance railing on left side (ascending)  -CM       Row Name 09/20/23 1021          Stairs Within Home, Primary    Stairs, Within Home, Primary Plans on d/c to rehab as she lives alone and gait is limited. (I) ambulation without an AD. Tub/shower, no shower chair.  -CM     Number of Stairs, Within Home, Primary none  -CM       Row Name 09/20/23 1021          Cognition    Orientation Status (Cognition) oriented x 4  -CM       Row Name 09/20/23 1021          Safety Issues, Functional Mobility    Impairments Affecting Function (Mobility) endurance/activity tolerance;coordination;balance;pain  -CM               User Key  (r) = Recorded By, (t) = Taken By, (c) = Cosigned By      Initials Name Provider Type    CM Perlita Rich OT Occupational Therapist                     Mobility/ADL's       Row Name 09/20/23 1021          Bed Mobility    Bed Mobility supine-sit;rolling right;rolling left  -CM     Rolling Left [place] (Bed Mobility) modified independence  -CM     Rolling Right [place] (Bed Mobility) modified independence  -CM     Supine-Sit [place] (Bed Mobility) modified independence  -CM     Assistive Device (Bed Mobility) head of bed elevated;bed rails  -CM       Row Name 09/20/23 1021          Transfers    Transfers sit-stand transfer;stand-sit transfer;toilet transfer  -CM        Row Name 09/20/23 1021          Sit-Stand Transfer    Sit-Stand [place] (Transfers) minimum assist (75% patient effort);2 person assist  -CM     Assistive Device (Sit-Stand Transfers) walker, front-wheeled  -CM       Row Name 09/20/23 1021          Stand-Sit Transfer    Stand-Sit [place] (Transfers) minimum assist (75% patient effort)  -CM     Assistive Device (Stand-Sit Transfers) walker, front-wheeled  -CM       Row Name 09/20/23 1021          Toilet Transfer    Type (Toilet Transfer) sit-stand;stand-sit  -CM     [place] Level (Toilet Transfer) minimum assist (75% patient effort)  -CM     Assistive Device (Toilet Transfer) walker, front-wheeled  -CM       Row Name 09/20/23 1021          Functional Mobility    Functional Mobility- Ind. Level minimum assist (75% patient effort)  -CM     Functional Mobility- Device walker, front-wheeled  -CM     Functional Mobility-Distance (Feet) 15  -CM       Row Name 09/20/23 1021          Activities of Daily Living    BADL Assessment/Intervention lower body dressing;toileting  -CM       Row Name 09/20/23 1021          Lower Body Dressing Assessment/Training    [place] Level (Lower Body Dressing) lower body dressing skills;doff;don;socks;set up  -CM     Position (Lower Body Dressing) edge of bed sitting  -CM       Row Name 09/20/23 1021          Toileting Assessment/Training    [place] Level (Toileting) toileting skills;adjust/manage clothing;perform perineal hygiene;contact guard assist  -CM     Position (Toileting) supported standing  -CM               User Key  (r) = Recorded By, (t) = Taken By, (c) = Cosigned By      Initials Name Provider Type    Perlita Burdick OT Occupational Therapist                   Obj/Interventions       Row Name 09/20/23 1021          Sensory Assessment (Somatosensory)    Sensory Assessment (Somatosensory) UE sensation intact  -CM       Row Name 09/20/23 1021          Range of Motion Comprehensive    General Range of  Motion bilateral upper extremity ROM WFL  -CM       Row Name 09/20/23 1021          Strength Comprehensive (MMT)    Comment, General Manual Muscle Testing (MMT) Assessment BUE 4+/5 grossly  -CM               User Key  (r) = Recorded By, (t) = Taken By, (c) = Cosigned By      Initials Name Provider Type    Perlita Burdick, OT Occupational Therapist                   Goals/Plan       Row Name 09/20/23 1021          Transfer Goal 1 (OT)    Activity/Assistive Device (Transfer Goal 1, OT) toilet  -CM     [place] Level/Cues Needed (Transfer Goal 1, OT) supervision required  -CM     Time Frame (Transfer Goal 1, OT) long term goal (LTG);by discharge  -CM     Progress/Outcome (Transfer Goal 1, OT) goal not met  -CM       Row Name 09/20/23 1021          Bathing Goal 1 (OT)    Activity/Device (Bathing Goal 1, OT) lower body bathing  -CM     [place] Level/Cues Needed (Bathing Goal 1, OT) supervision required  -CM     Time Frame (Bathing Goal 1, OT) long term goal (LTG);by discharge  -CM     Progress/Outcomes (Bathing Goal 1, OT) goal not met  -CM       Row Name 09/20/23 1021          Dressing Goal 1 (OT)    Activity/Device (Dressing Goal 1, OT) lower body dressing  -CM     [place]/Cues Needed (Dressing Goal 1, OT) supervision required  -CM     Time Frame (Dressing Goal 1, OT) long term goal (LTG);by discharge  -CM     Progress/Outcome (Dressing Goal 1, OT) goal not met  -CM       Row Name 09/20/23 1021          Problem Specific Goal 1 (OT)    Problem Specific Goal 1 (OT) Pt will tolerate at least 4 continuous minutes of standing ADL with SPV or less and zero LOB for increased balance required for ADLs and mobility.  -CM     Time Frame (Problem Specific Goal 1, OT) long term goal (LTG);by discharge  -CM     Progress/Outcome (Problem Specific Goal 1, OT) goal not met  -CM       Row Name 09/20/23 1021          Therapy Assessment/Plan (OT)    Planned Therapy Interventions (OT) activity tolerance training;adaptive  equipment training;BADL retraining;cognitive/visual perception retraining;manual therapy/joint mobilization;IADL retraining;functional balance retraining;edema control/reduction;neuromuscular control/coordination retraining;occupation/activity based interventions;ROM/therapeutic exercise;patient/caregiver education/training;passive ROM/stretching;transfer/mobility retraining;strengthening exercise  -CM               User Key  (r) = Recorded By, (t) = Taken By, (c) = Cosigned By      Initials Name Provider Type    CM Perlita Rich OT Occupational Therapist                   Clinical Impression       Row Name 09/20/23 1021          Pain Assessment    Pretreatment Pain Rating 5/10  -CM     Posttreatment Pain Rating 5/10  -CM     Pain Location - Side/Orientation Left  -CM     Pain Location - flank;abdomen  -CM     Pain Intervention(s) Repositioned;Ambulation/increased activity;Distraction  -CM       Row Name 09/20/23 1021          Plan of Care Review    Plan of Care Reviewed With patient  -CM     Outcome Evaluation OT eval completed. Co-eval with PT. Pt sitting EOB upon arrival. Alert and agreeable to therapy. Pt is (I) with ADLs and mobility at baseline. Pt lives alone. During session, pt was Mod I for bed mobility. Set-up for donning/doffing socks sitting EOB. Pt with BUE ROM WFL and BUE strength 4+/5. Pt was Min A for STS with FWW. Min A for mobility in room with FWW. Min A toileting t/f. CGA faraz-care in supported standing. Pt left supine in bed with exit alarm on and all needs in reach. Pt did experience balance impairment with mobility and required verbal cues throughout for proper FWW positioning. Pt may require rehab to home pending progression with IP OT. IP OT goals established.  -CM       Row Name 09/20/23 1021          Therapy Assessment/Plan (OT)    Patient/Family Therapy Goal Statement (OT) get stronger  -CM     Rehab Potential (OT) good, to achieve stated therapy goals  -CM     Criteria for Skilled  Therapeutic Interventions Met (OT) yes;meets criteria  -CM     Therapy Frequency (OT) other (see comments)  5-7 d/wk  -CM     Predicted Duration of Therapy Intervention (OT) until d/c or all goals met  -CM       Row Name 09/20/23 1021          Therapy Plan Review/Discharge Plan (OT)    Anticipated Discharge Disposition (OT) other (see comments);skilled nursing facility;inpatient rehabilitation facility;home with assist;home with home health  rehab to home  -CM       Row Name 09/20/23 1021          Vital Signs    Pre Systolic BP Rehab 128  -CM     Pre Treatment Diastolic BP 78  -CM     Pretreatment Heart Rate (beats/min) 73  -CM     Pre SpO2 (%) 94  -CM     O2 Delivery Pre Treatment room air  -CM     Pre Patient Position Supine  -CM       Row Name 09/20/23 1021          Positioning and Restraints    Pre-Treatment Position in bed  -CM     Post Treatment Position bed  -CM     In Bed notified nsg;supine;call light within reach;encouraged to call for assist;exit alarm on;side rails up x2  -CM               User Key  (r) = Recorded By, (t) = Taken By, (c) = Cosigned By      Initials Name Provider Type    Perlita Burdick, JAN Occupational Therapist                   Outcome Measures       Row Name 09/20/23 1021          How much help from another is currently needed...    Putting on and taking off regular lower body clothing? 3  -CM     Bathing (including washing, rinsing, and drying) 3  -CM     Toileting (which includes using toilet bed pan or urinal) 4  -CM     Putting on and taking off regular upper body clothing 4  -CM     Taking care of personal grooming (such as brushing teeth) 4  -CM     Eating meals 4  -CM     AM-PAC 6 Clicks Score (OT) 22  -CM       Row Name 09/20/23 1022 09/20/23 0900       How much help from another person do you currently need...    Turning from your back to your side while in flat bed without using bedrails? 4  -CZ 3  -MB    Moving from lying on back to sitting on the side of a flat bed  without bedrails? 4  -CZ 3  -MB    Moving to and from a bed to a chair (including a wheelchair)? 3  -CZ 3  -MB    Standing up from a chair using your arms (e.g., wheelchair, bedside chair)? 3  -CZ 3  -MB    Climbing 3-5 steps with a railing? 3  -CZ 3  -MB    To walk in hospital room? 3  -CZ 3  -MB    AM-PAC 6 Clicks Score (PT) 20  -CZ 18  -MB    Highest level of mobility 6 --> Walked 10 steps or more  -CZ 6 --> Walked 10 steps or more  -MB      Row Name 09/20/23 1022 09/20/23 1021       Functional Assessment    Outcome Measure Options AM-PAC 6 Clicks Basic Mobility (PT)  -CZ AM-PAC 6 Clicks Daily Activity (OT)  -CM              User Key  (r) = Recorded By, (t) = Taken By, (c) = Cosigned By      Initials Name Provider Type    Wendy Mayfield, RN Registered Nurse    Raul Bustamante, PT Physical Therapist    Perlita Burdick OT Occupational Therapist                    Occupational Therapy Education       Title: PT OT SLP Therapies (In Progress)       Topic: Occupational Therapy (In Progress)       Point: ADL training (Done)       Description:   Instruct learner(s) on proper safety adaptation and remediation techniques during self care or transfers.   Instruct in proper use of assistive devices.                  Learning Progress Summary             Patient Acceptance, E,TB, VU by  at 9/20/2023 1527    Comment: OT POC, Role of OT, d/c recommendations                         Point: Home exercise program (Not Started)       Description:   Instruct learner(s) on appropriate technique for monitoring, assisting and/or progressing therapeutic exercises/activities.                  Learner Progress:  Not documented in this visit.              Point: Precautions (Not Started)       Description:   Instruct learner(s) on prescribed precautions during self-care and functional transfers.                  Learner Progress:  Not documented in this visit.              Point: Body mechanics (Not Started)       Description:    Instruct learner(s) on proper positioning and spine alignment during self-care, functional mobility activities and/or exercises.                  Learner Progress:  Not documented in this visit.                              User Key       Initials Effective Dates Name Provider Type Discipline     11/18/22 -  Perlita Rich OT Occupational Therapist OT                  OT Recommendation and Plan  Planned Therapy Interventions (OT): activity tolerance training, adaptive equipment training, BADL retraining, cognitive/visual perception retraining, manual therapy/joint mobilization, IADL retraining, functional balance retraining, edema control/reduction, neuromuscular control/coordination retraining, occupation/activity based interventions, ROM/therapeutic exercise, patient/caregiver education/training, passive ROM/stretching, transfer/mobility retraining, strengthening exercise  Therapy Frequency (OT): other (see comments) (5-7 d/wk)  Plan of Care Review  Plan of Care Reviewed With: patient  Outcome Evaluation: OT eval completed. Co-eval with PT. Pt sitting EOB upon arrival. Alert and agreeable to therapy. Pt is (I) with ADLs and mobility at baseline. Pt lives alone. During session, pt was Mod I for bed mobility. Set-up for donning/doffing socks sitting EOB. Pt with BUE ROM WFL and BUE strength 4+/5. Pt was Min A for STS with FWW. Min A for mobility in room with FWW. Min A toileting t/f. CGA faraz-care in supported standing. Pt left supine in bed with exit alarm on and all needs in reach. Pt did experience balance impairment with mobility and required verbal cues throughout for proper FWW positioning. Pt may require rehab to home pending progression with IP OT. IP OT goals established.     Time Calculation:   Evaluation Complexity (OT)  Review Occupational Profile/Medical/Therapy History Complexity: expanded/moderate complexity  Assessment, Occupational Performance/Identification of Deficit Complexity: 3-5  performance deficits  Clinical Decision Making Complexity (OT): detailed assessment/moderate complexity  Overall Complexity of Evaluation (OT): moderate complexity     Time Calculation- OT       Row Name 09/20/23 1528             Time Calculation- OT    OT Start Time 1021  -CM      OT Stop Time 1100  -CM      OT Time Calculation (min) 39 min  -CM      OT Received On 09/20/23  -CM      OT Goal Re-Cert Due Date 10/03/23  -CM         Untimed Charges    OT Eval/Re-eval Minutes 39  -CM         Total Minutes    Untimed Charges Total Minutes 39  -CM       Total Minutes 39  -CM                User Key  (r) = Recorded By, (t) = Taken By, (c) = Cosigned By      Initials Name Provider Type    CM Perlita Rich OT Occupational Therapist                  Therapy Charges for Today       Code Description Service Date Service Provider Modifiers Qty    37658107163 HC OT EVAL MOD COMPLEXITY 3 9/20/2023 Perlita Rich OT GO 1                 Perlita Rich OT  9/20/2023    Electronically signed by Perlita Rich OT at 09/20/23 1528       Perlita Rich OT at 09/20/23 1527          Goal Outcome Evaluation:  Plan of Care Reviewed With: patient           Outcome Evaluation: OT eval completed. Co-eval with PT. Pt sitting EOB upon arrival. Alert and agreeable to therapy. Pt is (I) with ADLs and mobility at baseline. Pt lives alone. During session, pt was Mod I for bed mobility. Set-up for donning/doffing socks sitting EOB. Pt with BUE ROM WFL and BUE strength 4+/5. Pt was Min A for STS with FWW. Min A for mobility in room with FWW. Min A toileting t/f. CGA faraz-care in supported standing. Pt left supine in bed with exit alarm on and all needs in reach. Pt did experience balance impairment with mobility and required verbal cues throughout for proper FWW positioning. Pt may require rehab to home pending progression with IP OT. IP OT goals established.      Anticipated Discharge Disposition (OT): other (see comments), skilled nursing facility,  inpatient rehabilitation facility, home with assist, home with home health (rehab to home)    Electronically signed by Perlita Rich OT at 09/20/23 8909

## 2023-09-22 LAB
A-TOCOPHEROL VIT E SERPL-MCNC: 6.4 MG/L (ref 7–25.1)
ALB CSF/SERPL: 6 {RATIO} (ref 0–8)
ALBUMIN CSF-MCNC: 23 MG/DL (ref 8–37)
ALBUMIN SERPL-MCNC: 3.6 G/DL (ref 3.8–4.9)
GAMMA TOCOPHEROL SERPL-MCNC: 1 MG/L (ref 0.5–5.5)
IGG CSF-MCNC: 2.7 MG/DL (ref 0–6.7)
IGG SERPL-MCNC: 768 MG/DL (ref 586–1602)
IGG SYNTH RATE SER+CSF CALC-MRATE: -0.3 MG/DAY
IGG/ALB CLEAR SER+CSF-RTO: 0.6 (ref 0–0.7)
IGG/ALB CSF: 0.12 {RATIO} (ref 0–0.25)

## 2023-09-22 PROCEDURE — 99233 SBSQ HOSP IP/OBS HIGH 50: CPT | Performed by: NURSE PRACTITIONER

## 2023-09-22 PROCEDURE — 25010000002 ENOXAPARIN PER 10 MG: Performed by: INTERNAL MEDICINE

## 2023-09-22 PROCEDURE — G0378 HOSPITAL OBSERVATION PER HR: HCPCS

## 2023-09-22 PROCEDURE — 25010000002 METOCLOPRAMIDE PER 10 MG

## 2023-09-22 PROCEDURE — 97535 SELF CARE MNGMENT TRAINING: CPT

## 2023-09-22 PROCEDURE — 25010000002 MORPHINE PER 10 MG: Performed by: INTERNAL MEDICINE

## 2023-09-22 RX ADMIN — MORPHINE SULFATE 2 MG: 2 INJECTION, SOLUTION INTRAMUSCULAR; INTRAVENOUS at 03:29

## 2023-09-22 RX ADMIN — DOCUSATE SODIUM 50 MG AND SENNOSIDES 8.6 MG 2 TABLET: 8.6; 5 TABLET, FILM COATED ORAL at 20:53

## 2023-09-22 RX ADMIN — NICOTINE 1 PATCH: 21 PATCH, EXTENDED RELEASE TRANSDERMAL at 22:31

## 2023-09-22 RX ADMIN — DULOXETINE HYDROCHLORIDE 20 MG: 20 CAPSULE, DELAYED RELEASE ORAL at 08:26

## 2023-09-22 RX ADMIN — ENOXAPARIN SODIUM 40 MG: 40 INJECTION SUBCUTANEOUS at 08:26

## 2023-09-22 RX ADMIN — MORPHINE SULFATE 2 MG: 2 INJECTION, SOLUTION INTRAMUSCULAR; INTRAVENOUS at 12:38

## 2023-09-22 RX ADMIN — Medication 10 ML: at 20:54

## 2023-09-22 RX ADMIN — MORPHINE SULFATE 2 MG: 2 INJECTION, SOLUTION INTRAMUSCULAR; INTRAVENOUS at 08:26

## 2023-09-22 RX ADMIN — LISINOPRIL 10 MG: 10 TABLET ORAL at 08:26

## 2023-09-22 RX ADMIN — MORPHINE SULFATE 2 MG: 2 INJECTION, SOLUTION INTRAMUSCULAR; INTRAVENOUS at 22:33

## 2023-09-22 RX ADMIN — DOCUSATE SODIUM 50 MG AND SENNOSIDES 8.6 MG 2 TABLET: 8.6; 5 TABLET, FILM COATED ORAL at 08:26

## 2023-09-22 RX ADMIN — METOCLOPRAMIDE HYDROCHLORIDE 10 MG: 5 INJECTION INTRAMUSCULAR; INTRAVENOUS at 01:55

## 2023-09-22 RX ADMIN — LISINOPRIL 10 MG: 10 TABLET ORAL at 20:54

## 2023-09-22 RX ADMIN — METOCLOPRAMIDE HYDROCHLORIDE 10 MG: 5 INJECTION INTRAMUSCULAR; INTRAVENOUS at 20:54

## 2023-09-22 RX ADMIN — METOCLOPRAMIDE HYDROCHLORIDE 10 MG: 5 INJECTION INTRAMUSCULAR; INTRAVENOUS at 08:26

## 2023-09-22 RX ADMIN — MORPHINE SULFATE 2 MG: 2 INJECTION, SOLUTION INTRAMUSCULAR; INTRAVENOUS at 18:37

## 2023-09-22 RX ADMIN — Medication 10 ML: at 08:27

## 2023-09-22 RX ADMIN — OFLOXACIN 50000 UNITS: 300 TABLET, COATED ORAL at 10:45

## 2023-09-22 NOTE — PLAN OF CARE
Goal Outcome Evaluation:  Plan of Care Reviewed With: patient        Progress: no change  Outcome Evaluation: Initial assessment.  Intake 75% - 3x.  Will maintain pt on prescribed diet, send milk with meals and ice cream with lunch and supper, monitor wt, monitor intake.

## 2023-09-22 NOTE — PLAN OF CARE
Goal Outcome Evaluation:           Progress: no change  Outcome Evaluation: Pt agree to toileting only this date; no ther ex or bathing. pt mod I sup< > sit , Min A sit < > sytand and min A to perform fx'al mobility EOB< > toilet. Pt donned socks SBA prior to toileting. Pt declined further tx despite edu on benefits.      Anticipated Discharge Disposition (OT): other (see comments), skilled nursing facility, inpatient rehabilitation facility, home with assist, home with home health (rehab to home)

## 2023-09-22 NOTE — SIGNIFICANT NOTE
"   09/22/23 1515   OTHER   Discipline physical therapy assistant   Rehab Time/Intention   Session Not Performed patient/family declined treatment  (Pt. deferred any PT and stated \"I am down in the dumps and everyone keeps coming in one after another and I just need rest.\")       "

## 2023-09-22 NOTE — THERAPY TREATMENT NOTE
Patient Name: Hanna Escalante  : 1965    MRN: [id number]                              Today's Date: 2023       Admit Date: 2023    Visit Dx:     ICD-10-CM ICD-9-CM   1. Weakness of left lower extremity  R29.898 729.89   2. Impaired functional mobility, balance, gait, and endurance [Z74.09 (ICD-10-CM)]  Z74.09 V49.89   3. Impaired mobility and ADLs [Z74.09, Z78.9 (ICD-10-CM)]  Z74.09 V49.89    Z78.9      Patient Active Problem List   Diagnosis    Screening for malignant neoplasm of colon    Neck pain    Tobacco dependence    Insomnia    Back pain    Lower extremity weakness    Depression    Adjustment disorder with depressed mood    Cannabis abuse    Positive urine drug screen for methamphetamine     Past Medical History:   Diagnosis Date    Allergic     Arthritis     COPD (chronic obstructive pulmonary disease)     GERD (gastroesophageal reflux disease)     Hypertension     Infectious viral hepatitis     Low back pain      Past Surgical History:   Procedure Laterality Date    CHOLECYSTECTOMY      COLONOSCOPY N/A 2020    Procedure: COLONOSCOPY;  Surgeon: Joshua Perry MD;  Location: [facility] ENDOSCOPY;  Service: General    HYSTERECTOMY        General Information       Row Name 23          OT Time and Intention    Document Type therapy note (daily note)  -TO     Mode of Treatment occupational therapy;individual therapy  -TO       Row Name 23          General Information    Patient Profile Reviewed yes  -TO     Existing Precautions/Restrictions fall  -TO       Row Name 23          Cognition    Orientation Status (Cognition) oriented x 4  -TO       Row Name 23          Safety Issues, Functional Mobility    Impairments Affecting Function (Mobility) endurance/activity tolerance;coordination;balance;pain  -TO               User Key  (r) = Recorded By, (t) = Taken By, (c) = Cosigned By      Initials Name Provider Type    TO Issa Ventura COTA  Occupational Therapist Assistant                     Mobility/ADL's       Row Name 09/22/23 1658          Bed Mobility    Bed Mobility supine-sit;rolling right;rolling left  -TO     Rolling Left [place] (Bed Mobility) modified independence  -TO     Rolling Right [place] (Bed Mobility) modified independence  -TO     Supine-Sit [place] (Bed Mobility) modified independence  -TO     Assistive Device (Bed Mobility) head of bed elevated;bed rails  -TO       Row Name 09/22/23 1658          Transfers    Transfers sit-stand transfer;stand-sit transfer;toilet transfer  -TO       Row Name 09/22/23 1658          Sit-Stand Transfer    Sit-Stand [place] (Transfers) minimum assist (75% patient effort)  -TO     Assistive Device (Sit-Stand Transfers) walker, front-wheeled  -TO       Row Name 09/22/23 1658          Stand-Sit Transfer    Stand-Sit [place] (Transfers) minimum assist (75% patient effort)  -TO     Assistive Device (Stand-Sit Transfers) walker, front-wheeled  -TO       Row Name 09/22/23 1658          Toilet Transfer    Type (Toilet Transfer) sit-stand;stand-sit  -TO     [place] Level (Toilet Transfer) minimum assist (75% patient effort)  -TO     Assistive Device (Toilet Transfer) walker, front-wheeled  -TO       Row Name 09/22/23 1658          Functional Mobility    Functional Mobility- Ind. Level minimum assist (75% patient effort)  -TO     Functional Mobility- Device walker, front-wheeled  -TO       Row Name 09/22/23 1658          Lower Body Dressing Assessment/Training    [place] Level (Lower Body Dressing) lower body dressing skills;doff;don;socks;set up  -TO     Position (Lower Body Dressing) edge of bed sitting  -TO       Row Name 09/22/23 1658          Toileting Assessment/Training    [place] Level (Toileting) toileting skills;adjust/manage clothing;perform perineal hygiene;contact guard assist  -TO     Position (Toileting) supported standing  -TO               User Key  (r)  = Recorded By, (t) = Taken By, (c) = Cosigned By      Initials Name Provider Type    TO Issa Ventura COTA Occupational Therapist Assistant                   Obj/Interventions       Row Name 09/22/23 1658          Range of Motion Comprehensive    General Range of Motion bilateral lower extremity ROM WFL  -TO               User Key  (r) = Recorded By, (t) = Taken By, (c) = Cosigned By      Initials Name Provider Type    TO Issa Ventura COTA Occupational Therapist Assistant                   Goals/Plan       Row Name 09/22/23 1658          Transfer Goal 1 (OT)    Activity/Assistive Device (Transfer Goal 1, OT) toilet  -TO     [place] Level/Cues Needed (Transfer Goal 1, OT) supervision required  -TO     Time Frame (Transfer Goal 1, OT) long term goal (LTG);by discharge  -TO     Progress/Outcome (Transfer Goal 1, OT) goal not met  -TO       Row Name 09/22/23 1658          Bathing Goal 1 (OT)    Activity/Device (Bathing Goal 1, OT) lower body bathing  -TO     [place] Level/Cues Needed (Bathing Goal 1, OT) supervision required  -TO     Time Frame (Bathing Goal 1, OT) long term goal (LTG);by discharge  -TO     Progress/Outcomes (Bathing Goal 1, OT) goal not met  -TO       Row Name 09/22/23 1658          Dressing Goal 1 (OT)    Activity/Device (Dressing Goal 1, OT) lower body dressing  -TO     [place]/Cues Needed (Dressing Goal 1, OT) supervision required  -TO     Time Frame (Dressing Goal 1, OT) long term goal (LTG);by discharge  -TO     Progress/Outcome (Dressing Goal 1, OT) goal not met  -TO       Row Name 09/22/23 1658          Problem Specific Goal 1 (OT)    Problem Specific Goal 1 (OT) Pt will tolerate at least 4 continuous minutes of standing ADL with SPV or less and zero LOB for increased balance required for ADLs and mobility.  -TO     Time Frame (Problem Specific Goal 1, OT) long term goal (LTG);by discharge  -TO     Progress/Outcome (Problem Specific Goal 1, OT) goal not met  -TO                User Key  (r) = Recorded By, (t) = Taken By, (c) = Cosigned By      Initials Name Provider Type    TO Issa Ventura COTA Occupational Therapist Assistant                   Clinical Impression       [facility] Name 09/22/23 1658          Pain Assessment    Pretreatment Pain Rating 5/10  -TO     Posttreatment Pain Rating 5/10  -TO       Row Name 09/22/23 1658          Plan of Care Review    Progress no change  -TO     Outcome Evaluation Pt agree to toileting only this date; no ther ex or bathing. pt mod I sup< > sit , Min A sit < > sytand and min A to perform fx'al mobility EOB< > toilet. Pt donned socks SBA prior to toileting. Pt declined further tx despite edu on benefits.  -TO       [facility] Name 09/22/23 1658          Therapy Assessment/Plan (OT)    Rehab Potential (OT) good, to achieve stated therapy goals  -TO     Criteria for Skilled Therapeutic Interventions Met (OT) yes;meets criteria  -TO     Therapy Frequency (OT) other (see comments)  5-7 d/wk  -TO       Row Name 09/22/23 1658          Therapy Plan Review/Discharge Plan (OT)    Anticipated Discharge Disposition (OT) other (see comments);skilled nursing facility;inpatient rehabilitation facility;home with assist;home with home health  rehab to home  -TO       Row Name 09/22/23 1658          Vital Signs    Pretreatment Heart Rate (beats/min) 77  -TO     Posttreatment Heart Rate (beats/min) 83  -TO     Pre SpO2 (%) 95  -TO     O2 Delivery Pre Treatment room air  -TO     Post SpO2 (%) 95  -TO     O2 Delivery Post Treatment room air  -TO     Pre Patient Position Supine  -TO     Intra Patient Position Sitting  -TO     Post Patient Position Supine  -TO       Row Name 09/22/23 1658          Positioning and Restraints    Pre-Treatment Position in bed  -TO     Post Treatment Position bed  -TO     In Bed supine;call light within reach;encouraged to call for assist;exit alarm on  -TO               User Key  (r) = Recorded By, (t) = Taken By, (c) = Cosigned By       Initials Name Provider Type    TO Issa Ventura COTA Occupational Therapist Assistant                   Outcome Measures       Row Name 09/22/23 1658          How much help from another is currently needed...    Putting on and taking off regular lower body clothing? 3  -TO     Bathing (including washing, rinsing, and drying) 3  -TO     Toileting (which includes using toilet bed pan or urinal) 3  -TO     Putting on and taking off regular upper body clothing 4  -TO     Taking care of personal grooming (such as brushing teeth) 4  -TO     Eating meals 4  -TO     AM-PAC 6 Clicks Score (OT) 21  -TO       Row Name 09/22/23 0826          How much help from another person do you currently need...    Turning from your back to your side while in flat bed without using bedrails? 4  -LW     Moving from lying on back to sitting on the side of a flat bed without bedrails? 4  -LW     Moving to and from a bed to a chair (including a wheelchair)? 3  -LW     Standing up from a chair using your arms (e.g., wheelchair, bedside chair)? 3  -LW     Climbing 3-5 steps with a railing? 3  -LW     To walk in hospital room? 3  -LW     AM-PAC 6 Clicks Score (PT) 20  -LW     Highest level of mobility 6 --> Walked 10 steps or more  -LW               User Key  (r) = Recorded By, (t) = Taken By, (c) = Cosigned By      Initials Name Provider Type    TO Issa Ventura COTA Occupational Therapist Assistant    Aimee Mcadams RN Registered Nurse                    Occupational Therapy Education       Title: PT OT SLP Therapies (In Progress)       Topic: Occupational Therapy (In Progress)       Point: ADL training (Done)       Description:   Instruct learner(s) on proper safety adaptation and remediation techniques during self care or transfers.   Instruct in proper use of assistive devices.                  Learning Progress Summary             Patient Acceptance, E,TB, VU by  at 9/20/2023 2426    Comment: OT POC, Role of OT, d/c  recommendations                         Point: Home exercise program (Not Started)       Description:   Instruct learner(s) on appropriate technique for monitoring, assisting and/or progressing therapeutic exercises/activities.                  Learner Progress:  Not documented in this visit.              Point: Precautions (Not Started)       Description:   Instruct learner(s) on prescribed precautions during self-care and functional transfers.                  Learner Progress:  Not documented in this visit.              Point: Body mechanics (Not Started)       Description:   Instruct learner(s) on proper positioning and spine alignment during self-care, functional mobility activities and/or exercises.                  Learner Progress:  Not documented in this visit.                              User Key       Initials Effective Dates Name Provider Type Discipline     11/18/22 -  Perlita Rich OT Occupational Therapist OT                  OT Recommendation and Plan  Therapy Frequency (OT): other (see comments) (5-7 d/wk)  Plan of Care Review  Progress: no change  Outcome Evaluation: Pt agree to toileting only this date; no ther ex or bathing. pt mod I sup< > sit , Min A sit < > sytand and min A to perform fx'al mobility EOB< > toilet. Pt donned socks SBA prior to toileting. Pt declined further tx despite edu on benefits.     Time Calculation:         Time Calculation- OT       Row Name 09/22/23 1725             Time Calculation- OT    OT Start Time 1658  -TO      OT Stop Time 1714  -TO      OT Time Calculation (min) 16 min  -TO      Total Timed Code Minutes- OT 16 minute(s)  -TO      OT Received On 09/22/23  -TO         Timed Charges    03226 - OT Self Care/Mgmt Minutes 16  -TO         Total Minutes    Timed Charges Total Minutes 16  -TO       Total Minutes 16  -TO                User Key  (r) = Recorded By, (t) = Taken By, (c) = Cosigned By      Initials Name Provider Type    TO Issa Ventura COTA  Occupational Therapist Assistant                  Therapy Charges for Today       Code Description Service Date Service Provider Modifiers Qty    76066118791 HC OT SELF CARE/MGMT/TRAIN EA 15 MIN 9/21/2023 Issa Ventura COTA GO 1    [id number] HC OT SELF CARE/MGMT/TRAIN EA 15 MIN 9/22/2023 Issa Ventura COTA GO 1                 LINDSEY Bragg  9/22/2023

## 2023-09-22 NOTE — PLAN OF CARE
Goal Outcome Evaluation:   Patient resting in bed with eyes closed. Complaints of left abdomen.pain.Prn medication given per order. Denies nausea and vomiting at this time. No signs or symptoms of distress . Bed low and brakes on.

## 2023-09-22 NOTE — CONSULTS
"Adult Nutrition  Assessment/PES    Patient Name:  Hanna Escalante  YOB: 1965  MRN: [id number]  Admit Date:  9/17/2023    Assessment Date:  9/22/2023    Comments:  Pt with dx left lower extremith weakness, suicidal ideation - depression - adjustment disorder with depressed mood, polysubstance abuse - cannabis abuse.  Medical hx includes COPD, GERD, HTN, infectious viral hepatitis.  Pt reports good appetite.  Pt reports good appetite.  Intake 75% - 3x.  Wt however has trended down with admission wt being 160 lb - 8/17/23.  Pt willing to receive milk with meals and indicates she likes ice cream.  Pt indicates she doesn't have any teeth but wants  regular consistency food.  Pt denied GI distress.  Meds and labs reviewed.  Will maintain pt on prescribed diet, send milk with meals and ice cream  with lunch and supper, monitor wt, monitor intake.       Reason for Assessment       Row Name 09/22/23 1129          Reason for Assessment    Reason For Assessment per organizational policy  Length of Stay                      Labs/Tests/Procedures/Meds       Row Name 09/22/23 1141          Labs/Procedures/Meds    Lab Results Reviewed reviewed        Medications    Pertinent Medications Reviewed reviewed                      Estimated/Assessed Needs - Anthropometrics       Row Name 09/22/23 1141          Anthropometrics    Age for Calculations 57     Height for Calculation 1.549 m (5' 0.98\")     Weight for Calculation 53 kg (116 lb 13.5 oz)        Estimated/Assessed Needs    Additional Documentation Radom-St. Jeor Equation (Group);Protein Requirements (Group);Fluid Requirements (Group)        Radom-St. Jeor Equation    RMR (Radom-St. Jeor Equation) 1042.375     Radom-St. Jeor Activity Factors 1.2     Activity Factors (Radom-St. Jeor) 1250.85        Protein Requirements    Weight Used For Protein Calculations 53 kg (116 lb 13.5 oz)     Est Protein Requirement Amount (gms/kg) 1.2 gm protein     Estimated " Protein Requirements (gms/day) 63.6        Fluid Requirements    Fluid Requirements (mL/day) 1592     RDA Method (mL) 1592                    Nutrition Prescription Ordered       Row Name 09/22/23 1146          Nutrition Prescription PO    Current PO Diet Regular     Common Modifiers Cardiac                    Evaluation of Received Nutrient/Fluid Intake       Row Name 09/22/23 1147          PO Evaluation    Number of Meals 3     % PO Intake 75% - 3x                       Problem/Interventions:   Problem 1       Row Name 09/22/23 1147          Nutrition Diagnoses Problem 1    Problem 1 Unintended Weight Loss     Etiology (related to) Other (comment)  inadequate intake     Signs/Symptoms (evidenced by) Unintended Weight Change     Unintended Weight Change Loss     Number of Pounds Lost 8     Weight loss time period since 9/17/23                          Intervention Goal       Row Name 09/22/23 1148          Intervention Goal    General Maintain nutrition;Meet nutritional needs for age/condition     PO Meet estimated needs     Weight Maintain weight                    Nutrition Intervention       Row Name 09/22/23 1148          Nutrition Intervention    RD/Tech Action Interview for preference;Encourage intake;Recommend/ordered;Supplement provided     Recommended/Ordered Supplement                    Nutrition Prescription       Row Name 09/22/23 1149          Nutrition Prescription PO    PO Prescription Begin/change supplement     Supplement Ice Cream;Milk     Supplement Frequency Other (comment)                    Education/Evaluation       Row Name 09/22/23 1150          Education    Education Other (comment)  Encourage intake of meals and supplements.        Monitor/Evaluation    Monitor Per protocol;PO intake;Supplement intake;Weight     Education Follow-up Reinforce PRN                     Electronically signed by:  Mary Car RD  09/22/23 11:53 CDT

## 2023-09-22 NOTE — PROGRESS NOTES
[facility]  INPATIENT PROGRESS NOTE    Length of Stay: 0  Date of Admission: 9/17/2023  Primary Care Physician: Ninfa Bhatti APRN    Subjective   Chief Complaint: none  HPI:   History of Present Illness  Patient with past medical history of COPD, GERD, hypertension, arthritis presents with left lower extremity weakness.  Patient states that lower extremity weakness began 3 days ago.  Patient admits to history of substance abuse, and was reportedly positive for methamphetamines at admission. No loose of bowel or urine, no fever, no urinary symptoms or GI symptoms.  CT abdomen/pelvis shows no constipation, or acute abdominal signs.    As of today, 09/22/23  Review of Systems   Constitutional:  Negative for activity change, appetite change, chills, diaphoresis and fatigue.   HENT:  Negative for congestion, ear discharge, hearing loss, rhinorrhea, sinus pain, sneezing and sore throat.    Eyes:  Negative for photophobia, discharge and visual disturbance.   Respiratory:  Negative for cough, chest tightness, shortness of breath and wheezing.    Cardiovascular:  Negative for chest pain and palpitations.   Gastrointestinal:  Negative for abdominal distention, abdominal pain, blood in stool, diarrhea, nausea and vomiting.   Endocrine: Negative for cold intolerance, heat intolerance, polydipsia, polyphagia and polyuria.   Genitourinary:  Negative for dysuria, flank pain, hematuria and urgency.   Musculoskeletal:  Negative for arthralgias, joint swelling and myalgias.   Skin:  Negative for color change.   Allergic/Immunologic: Negative for food allergies.   Neurological:  Negative for dizziness, seizures, syncope, speech difficulty, weakness and headaches.   Hematological:  Negative for adenopathy. Does not bruise/bleed easily.   Psychiatric/Behavioral:  Negative for confusion, hallucinations, self-injury and suicidal ideas. The patient is not  nervous/anxious.       All pertinent negatives and positives are as above. All other systems have been reviewed and are negative unless otherwise stated.    Objective    Temp:  [96.9 °F (36.1 °C)-98.6 °F (37 °C)] 96.9 °F (36.1 °C)  Heart Rate:  [65-73] 68  Resp:  [18] 18  BP: (108-138)/(57-75) 138/75    AM-PAC 6 Clicks Score (PT): 20 (09/21/23 0806)    As of today, 09/22/23  Physical Exam  Constitutional:       Appearance: Normal appearance. She is obese.   HENT:      Head: Normocephalic and atraumatic.      Right Ear: Tympanic membrane normal.      Left Ear: Tympanic membrane normal.      Nose: Nose normal.      Mouth/Throat:      Mouth: Mucous membranes are moist.   Eyes:      Pupils: Pupils are equal, round, and reactive to light.   Cardiovascular:      Rate and Rhythm: Normal rate and regular rhythm.      Pulses: Normal pulses.      Heart sounds: Normal heart sounds.   Pulmonary:      Effort: Pulmonary effort is normal.      Breath sounds: Normal breath sounds.   Abdominal:      General: Abdomen is flat. Bowel sounds are normal.      Palpations: Abdomen is soft.   Musculoskeletal:         General: Normal range of motion.      Cervical back: Normal range of motion and neck supple.   Skin:     General: Skin is warm and dry.      Capillary Refill: Capillary refill takes less than 2 seconds.   Neurological:      General: No focal deficit present.      Mental Status: She is alert and oriented to person, place, and time.   Psychiatric:         Mood and Affect: Mood normal.         Behavior: Behavior normal.         Thought Content: Thought content normal.         Judgment: Judgment normal.         Results Review:  I have reviewed the labs, radiology results, and diagnostic studies.    Laboratory Data:   Results from last 7 days   Lab Units 09/21/23  0620 09/20/23  0511 09/19/23  0556 09/17/23  1843   SODIUM mmol/L 137 137 138 139   POTASSIUM mmol/L 4.5 4.1 4.0 4.2   CHLORIDE mmol/L 101 101 103 103   CO2 mmol/L 28.0  28.0 25.0 26.0   BUN mg/dL 25* 16 22* 22*   CREATININE mg/dL 0.98 0.92 0.88 1.05*   GLUCOSE mg/dL 101* 101* 107* 108*   CALCIUM mg/dL 9.3 8.9 8.8 9.0   BILIRUBIN mg/dL  --   --   --  0.5   ALK PHOS U/L  --   --   --  129*   ALT (SGPT) U/L  --   --   --  41*   AST (SGOT) U/L  --   --   --  37*   ANION GAP mmol/L 8.0 8.0 10.0 10.0     Estimated Creatinine Clearance: 55 mL/min (by C-G formula based on SCr of 0.98 mg/dL).  Results from last 7 days   Lab Units 09/19/23  0556 09/17/23  1843   MAGNESIUM mg/dL 1.9 2.0         Results from last 7 days   Lab Units 09/21/23  0620 09/20/23  0511 09/19/23  0556 09/18/23  0831 09/17/23  1843   WBC 10*3/mm3 6.02 6.43 5.78 6.91 8.17   HEMOGLOBIN g/dL 14.6 14.2 14.7 15.0 16.4*   HEMATOCRIT % 43.8 42.4 43.5 43.0 47.4*   PLATELETS 10*3/mm3 201 212 195 195 236           Culture Data:   No results found for: BLOODCX  No results found for: URINECX  No results found for: RESPCX  No results found for: WOUNDCX  No results found for: STOOLCX  No components found for: BODYFLD    Radiology Data:   Imaging Results (Last 24 Hours)       ** No results found for the last 24 hours. **            I have utilized all available immediate resources to obtain, update, or review the patient's current medications (including all prescriptions, over-the-counter products, herbals, cannabis/cannabidiol products, and vitamin/mineral/dietary (nutritional) supplements).       Assessment/Plan     Active Hospital Problems    Diagnosis    • **Lower extremity weakness    • Depression    • Adjustment disorder with depressed mood    • Cannabis abuse    • Positive urine drug screen for methamphetamine          Assessment and   Plan:  57 years old female patient with chronic back pain, takes care of herself at home with some mobility problems, complaining of lower back pain and leg weakness, MRI spine showing myelitis at T4 - T8 rule out infectious vs demyelinating process, pending CSF results, arterial US legs  normal.  Left lower extremity weakness: MRI spine: Abnormal T2 hyperintensity within the dorsal column of the thoracic spinal cord from the T4 level through the T8 level.  No abnormal enhancement.  These findings are nonspecific.  This may represent a demyelinating process. An infectious/viral myelitis is also consideration.  This is not have the typical appearance of neoplasm which is considered unlikely.  Possibility of cord infarct is also a consideration. Tele neurology consulted and ordered LP,  CSF chemistry results normal, CSF meningitis panel normal, will follow tele neurology recommendations, ready to be discharge to rehab, pending placement by .     Suicidal ideation:  - Followed by DR Good, resolved, he cleared her off       Polysubstance abuse:  - Patient positive for methamphetamines 9/13/2023.  Patient's urine drug screen negative for methamphetamines 9/17.  Recommend patient stop using recreational drugs.       Hypertension: Continue home medications    Medical Decision Making  Number and Complexity of problems: 3  Differential Diagnosis: myelitis    Conditions and Status:        Condition is improving.     MDM Data  External documents reviewed: previous medical records  My EKG interpretation: sinus rhythm  My plain film interpretation: MRI spine thoracic  Tests considered but not ordered: none     Decision rules/scores evaluated (example [id number], Wells, etc): n/a     Discussed with: the patient and neurology     Treatment Plan  Pending placement  Outpatient follow up by Justo AGLINDO  Care Planning  Shared decision making: patient  Code status and discussions: full code    Disposition  Social Determinants of Health that impact treatment or disposition: none  I expect the patient to be discharged to rehab in 1 days.       I confirmed that the patient's Advance Care Plan is present, code status is documented, or surrogate decision maker is listed in the patient's medical  record.      This document has been electronically signed by Edis Valero MD on September 22, 2023 10:08 CDT

## 2023-09-22 NOTE — PROGRESS NOTES
Stroke Progress Note       Chief Complaint:  LLE weakness    Subjective     HPI: Pt is a 57-yr-old right-handed white female with known diagnoses of hypertension, Substance abuse, and arthritis who presented on 9/17 with LLE weakness. She also c/o radicular pain to her LLE and LLE weakness, gait instability.   Neuro assessment was notable for LLE asymmetric hyperreflexia at that time but has improved some. Today she still has some LLE weakness/incoordination but can lift, states it's heavy and numbness from her foot to her abdomen.       Review of Systems   HENT: Negative.     Eyes: Negative.    Respiratory: Negative.     Cardiovascular: Negative.    Gastrointestinal:  Positive for constipation.   Genitourinary: Negative.    Musculoskeletal: Negative.    Skin: Negative.    Neurological:  Positive for weakness and numbness.   Psychiatric/Behavioral: Negative.        Objective      Temp:  [96.9 °F (36.1 °C)-98.5 °F (36.9 °C)] 98.4 °F (36.9 °C)  Heart Rate:  [65-73] 69  Resp:  [18] 18  BP: (101-138)/(57-75) 101/58    Neurological Exam  Mental Status  Alert. Oriented to person, place, time and situation. Language is fluent with no aphasia.    Cranial Nerves  CN II: Visual acuity is normal. Visual fields full to confrontation.  CN III, IV, VI: Extraocular movements intact bilaterally. Normal lids and orbits bilaterally. Pupils equal round and reactive to light bilaterally.  CN V: Facial sensation is normal.  CN VII: Full and symmetric facial movement.  CN IX, X: Palate elevates symmetrically. Normal gag reflex.  CN XI: Shoulder shrug strength is normal.  CN XII: Tongue midline without atrophy or fasciculations.    Motor  Normal muscle bulk throughout. No fasciculations present. Strength is 5/5 in all four extremities except as noted.  LLE heaviness.    Sensory  Light touch abnormality: LLE up to abdomen.     Reflexes                                            Right                      Left  Brachioradialis                     2+                         2+  Biceps                                 2+                         2+  Patellar                                2+                         2+    Coordination    Finger-to-nose, rapid alternating movements and heel-to-shin normal bilaterally without dysmetria.    Gait    Unsteady with walker.    Physical Exam  Constitutional:       Appearance: Normal appearance.   HENT:      Head: Normocephalic and atraumatic.   Eyes:      General: Lids are normal.      Extraocular Movements: Extraocular movements intact.      Pupils: Pupils are equal, round, and reactive to light.   Cardiovascular:      Rate and Rhythm: Normal rate.   Pulmonary:      Effort: Pulmonary effort is normal.   Musculoskeletal:      Cervical back: Normal range of motion.   Skin:     General: Skin is warm and dry.   Neurological:      Mental Status: She is alert.      Sensory: Sensory deficit present.      Motor: Weakness present.      Coordination: Coordination is intact.      Deep Tendon Reflexes:      Reflex Scores:       Bicep reflexes are 2+ on the right side and 2+ on the left side.       Brachioradialis reflexes are 2+ on the right side and 2+ on the left side.       Patellar reflexes are 2+ on the right side and 2+ on the left side.      Results Review:    I reviewed the patient's new clinical results.    Lab Results (last 24 hours)       Procedure Component Value Units Date/Time    IgG Index & Synthesis Rate (Collect RED Top) - Cerebrospinal Fluid, Lumbar Puncture [[id number]]  (Abnormal) Collected: 09/19/23 1357    Specimen: Cerebrospinal Fluid from Lumbar Puncture Updated: 09/22/23 1312     IgG, CSF 2.7 mg/dL      Albumin, CSF 23 mg/dL      IgG 768 mg/dL      Albumin 3.6 g/dL      IgG/Alb Ratio, CSF 0.12     IgG Index, CSF 0.6     IgG Synthesis Rate, CSF    -.3 mg/day      CSF/Serum Albumin Index 6     Comment:          Relationship to blood-brain barrier:            Consistent with an intact barrier        <9             Slight impairment                   9 -  14            Moderate impairment                14 -  30            Severe impairment                  30 - 100            Complete breakdown                     >100       Narrative:      Performed at:  [address] [place] [address]  [id number]  : Samson Ervin PhD, Phone:  [phone]    Vitamin E [[id number]]  (Abnormal) Collected: 09/19/23 1257    Specimen: Blood Updated: 09/22/23 1312     Vitamin E (Alpha Tocopherol) 6.4 mg/L      Vitamin E (Gamma Tocopherol) 1.0 mg/L      Comment: Reference intervals for alpha and gamma-tocopherol determined from  National Health and Nutrition Examination Survey, 5698-4838.  Individuals with alpha-tocopherol levels less than 5.0 mg/L are  considered vitamin E deficient.       Narrative:      Test(s) 070141-Vitamin E(Alpha Tocopherol); 508529-  Vitamin E(Gamma Tocopherol)  was developed and its performance characteristics determined  by [facility]. It has not been cleared or approved by the Food  and Drug Administration.  Performed at:  [address] [place] [address]  [id number]  : Yina Hunter MD, Phone:  [phone]    VDRL, CSF - Cerebrospinal Fluid, Lumbar Puncture [[id number]] Collected: 09/19/23 1357    Specimen: Cerebrospinal Fluid from Lumbar Puncture Updated: 09/21/23 1609     VDRL, Quantitative, CSF Non Reactive    Narrative:      Performed at:  [address] [place] [address]  [id number]  : Yina Hunter MD, Phone:  [phone]    Oligoclonal Banding (COLLECT RED TUBE) [[id number]] Collected: 09/19/23 1257    Specimen: Cerebrospinal Fluid from Lumbar Puncture Updated: 09/21/23 1512    Narrative:      The following orders were created for panel order Oligoclonal Banding (COLLECT RED TUBE).  Procedure                               Abnormality         Status                     ---------                                -----------         ------                     Oligoclonal Banding - Ce...[[id number]]                      Final result               Red Top[[id number]]                                          Final result                 Please view results for these tests on the individual orders.    Oligoclonal Banding - Cerebrospinal Fluid, Lumbar Puncture [[id number]] Collected: 09/19/23 5332    Specimen: Cerebrospinal Fluid from Lumbar Puncture Updated: 09/21/23 1512     Oligoclonal Bands, CSF Comment     Comment: Zero (0) oligoclonal bands were observed in the CSF.  Interpretation:   Criteria for Positivity: Four (4) or more oligoclonal bands observed   only in the CSF have been shown to be most consistent with MS   using our method. [Gudelia AS, Lavelle EL, Alejandro BLAIR, and   Anderson JA: Cerebrospinal Fluid Oligoclonal Bands in the Diagnosis   of Multiple Sclerosis. Am J Clin Pathol 120(5):672-675, 2003].   Oligoclonal bands that are present only in the CSF have been   associated with a variety of inflammatory brain diseases such as   multiple sclerosis (MS), subacute encephalitis, neurosyphilis, etc.   Increased IgG in the CSF is not specific for MS, but is an indication   of chronic neural inflammation. Clinical correlation indicated.   Approximately 2-3% of clinically confirmed MS patients show little   or no evidence of oligoclonal bands in the CSF; however oligoclonal   bands may develop as the disease progresses.   Oligoclonal Banding testing performed using Isoelectric Focusing   (IEF) and immunoblotting methodology.       Narrative:      Performed at:  [address] [place] [address]  [id number]  : Samson Ervin PhD, Phone:  [phone]          No radiology results for the last day        Assessment/Plan     Assessment/Plan: Pt is a 57-yr-old right-handed white female with known diagnoses of hypertension, Substance abuse, and arthritis who presented on 9/17 with LLE weakness. She  also c/o radicular pain to her LLE and LLE weakness, gait instability.   Neuro assessment was notable for LLE asymmetric hyperreflexia at that time but has improved some. Today she still has some LLE weakness/incoordination but can lift, states it's heavy and numbness from her foot to her abdomen.     MRI brain, which showed no evidence of CVA, lumbar spine MRI was unremarkable. Cervical and thoracic MRI showed some signal in the posterior t-spine between T4-T8 which looks chronic but Lumbar Puncture was completed to assess for infection or any other inflammatory process. CSF test have been neg but still a couple pending. Metabolic myelopathy cannot be ruled out. Will follow up on those. She needs IRF for LLE weakness/incoordination. Appointment made for a 6 week follow-up with neurology. Neurology will sign off for now.          Lower extremity weakness    Depression    Adjustment disorder with depressed mood    Cannabis abuse    Positive urine drug screen for methamphetamine          Justo MATEO Avina  09/22/23  13:27 CDT        I spent 40 minutes caring for Hanna Escalante  on this date of service. This time includes time spent by me in the following activities: preparing for the visit, reviewing tests, obtaining and/or reviewing a separately obtained history, performing a medically appropriate examination and/or evaluation, counseling and educating the patient/family/caregiver, ordering medications, tests, or procedures, referring and communicating with other health care professionals, documenting information in the medical record, independently interpreting results and communicating that information with the patient/family/caregiver, and care coordination

## 2023-09-23 PROCEDURE — 25010000002 MORPHINE PER 10 MG: Performed by: INTERNAL MEDICINE

## 2023-09-23 PROCEDURE — 25010000002 METOCLOPRAMIDE PER 10 MG

## 2023-09-23 PROCEDURE — G0378 HOSPITAL OBSERVATION PER HR: HCPCS

## 2023-09-23 PROCEDURE — 97535 SELF CARE MNGMENT TRAINING: CPT

## 2023-09-23 RX ADMIN — NICOTINE 1 PATCH: 21 PATCH, EXTENDED RELEASE TRANSDERMAL at 20:06

## 2023-09-23 RX ADMIN — METOCLOPRAMIDE HYDROCHLORIDE 10 MG: 5 INJECTION INTRAMUSCULAR; INTRAVENOUS at 08:46

## 2023-09-23 RX ADMIN — MORPHINE SULFATE 2 MG: 2 INJECTION, SOLUTION INTRAMUSCULAR; INTRAVENOUS at 23:45

## 2023-09-23 RX ADMIN — MORPHINE SULFATE 2 MG: 2 INJECTION, SOLUTION INTRAMUSCULAR; INTRAVENOUS at 20:05

## 2023-09-23 RX ADMIN — DOCUSATE SODIUM 50 MG AND SENNOSIDES 8.6 MG 2 TABLET: 8.6; 5 TABLET, FILM COATED ORAL at 08:46

## 2023-09-23 RX ADMIN — DULOXETINE HYDROCHLORIDE 20 MG: 20 CAPSULE, DELAYED RELEASE ORAL at 08:46

## 2023-09-23 RX ADMIN — LISINOPRIL 10 MG: 10 TABLET ORAL at 08:46

## 2023-09-23 RX ADMIN — CYCLOBENZAPRINE HYDROCHLORIDE 10 MG: 10 TABLET, FILM COATED ORAL at 20:09

## 2023-09-23 RX ADMIN — Medication 10 ML: at 10:09

## 2023-09-23 RX ADMIN — METOCLOPRAMIDE HYDROCHLORIDE 10 MG: 5 INJECTION INTRAMUSCULAR; INTRAVENOUS at 02:23

## 2023-09-23 RX ADMIN — LISINOPRIL 10 MG: 10 TABLET ORAL at 20:30

## 2023-09-23 RX ADMIN — MORPHINE SULFATE 2 MG: 2 INJECTION, SOLUTION INTRAMUSCULAR; INTRAVENOUS at 02:23

## 2023-09-23 RX ADMIN — METOCLOPRAMIDE HYDROCHLORIDE 10 MG: 5 INJECTION INTRAMUSCULAR; INTRAVENOUS at 20:05

## 2023-09-23 RX ADMIN — MORPHINE SULFATE 2 MG: 2 INJECTION, SOLUTION INTRAMUSCULAR; INTRAVENOUS at 15:46

## 2023-09-23 RX ADMIN — MORPHINE SULFATE 2 MG: 2 INJECTION, SOLUTION INTRAMUSCULAR; INTRAVENOUS at 06:32

## 2023-09-23 RX ADMIN — MORPHINE SULFATE 2 MG: 2 INJECTION, SOLUTION INTRAMUSCULAR; INTRAVENOUS at 11:17

## 2023-09-23 NOTE — PROGRESS NOTES
[facility] Medicine Services  INPATIENT PROGRESS NOTE    Length of Stay: 0  Date of Admission: 9/17/2023  Primary Care Physician: Ninfa Bhatti APRN    Subjective   Chief Complaint: none  HPI:   History of Present Illness  Patient with past medical history of COPD, GERD, hypertension, arthritis presents with left lower extremity weakness.  Patient states that lower extremity weakness began 3 days prior to admission.      Patient reportedly admitted to history of substance abuse, and was reportedly positive for methamphetamines at admission. No loose of bowel or urine, no fever, no urinary symptoms or GI symptoms. CT abdomen/pelvis shows no constipation, or acute abdominal signs.    She reports that she is feeling some better today. She is getting better slowly.    Review of Systems     Objective    Temp:  [97.5 °F (36.4 °C)-98.4 °F (36.9 °C)] 97.5 °F (36.4 °C)  Heart Rate:  [64-79] 79  Resp:  [18] 18  BP: (101-138)/(58-83) 134/82    AM-PAC 6 Clicks Score (PT): 20 (09/22/23 2233)    As of today, 09/23/23  Physical Exam  Constitutional:       Appearance: Normal appearance. She is obese.   HENT:      Head: Normocephalic and atraumatic.      Right Ear: Tympanic membrane normal.      Left Ear: Tympanic membrane normal.      Nose: Nose normal.      Mouth/Throat:      Mouth: Mucous membranes are moist.   Eyes:      Pupils: Pupils are equal, round, and reactive to light.   Cardiovascular:      Rate and Rhythm: Normal rate and regular rhythm.   Pulmonary:      Effort: Pulmonary effort is normal.      Breath sounds: Normal breath sounds.   Abdominal:      General: Abdomen is flat. Bowel sounds are normal.      Palpations: Abdomen is soft.   Musculoskeletal:         General: Normal range of motion.      Cervical back: Neck supple.   Skin:     General: Skin is warm and dry.      Capillary Refill: Capillary refill takes less than 2 seconds.   Neurological:      General: No  focal deficit present.      Mental Status: She is alert and oriented to person, place, and time.   Psychiatric:         Mood and Affect: Mood normal.         Behavior: Behavior normal.     Results Review:  I have reviewed the labs, radiology results, and diagnostic studies.    Laboratory Data:   Results from last 7 days   Lab Units 09/21/23  0620 09/20/23  0511 09/19/23  0556 09/17/23  1843   SODIUM mmol/L 137 137 138 139   POTASSIUM mmol/L 4.5 4.1 4.0 4.2   CHLORIDE mmol/L 101 101 103 103   CO2 mmol/L 28.0 28.0 25.0 26.0   BUN mg/dL 25* 16 22* 22*   CREATININE mg/dL 0.98 0.92 0.88 1.05*   GLUCOSE mg/dL 101* 101* 107* 108*   CALCIUM mg/dL 9.3 8.9 8.8 9.0   BILIRUBIN mg/dL  --   --   --  0.5   ALK PHOS U/L  --   --   --  129*   ALT (SGPT) U/L  --   --   --  41*   AST (SGOT) U/L  --   --   --  37*   ANION GAP mmol/L 8.0 8.0 10.0 10.0       Estimated Creatinine Clearance: 55 mL/min (by C-G formula based on SCr of 0.98 mg/dL).  Results from last 7 days   Lab Units 09/19/23  0556 09/17/23  1843   MAGNESIUM mg/dL 1.9 2.0           Results from last 7 days   Lab Units 09/21/23  0620 09/20/23  0511 09/19/23  0556 09/18/23  0831 09/17/23  1843   WBC 10*3/mm3 6.02 6.43 5.78 6.91 8.17   HEMOGLOBIN g/dL 14.6 14.2 14.7 15.0 16.4*   HEMATOCRIT % 43.8 42.4 43.5 43.0 47.4*   PLATELETS 10*3/mm3 201 212 195 195 236             Culture Data:   No results found for: BLOODCX  No results found for: URINECX  No results found for: RESPCX  No results found for: WOUNDCX  No results found for: STOOLCX  No components found for: BODYFLD    Radiology Data:   Imaging Results (Last 24 Hours)       ** No results found for the last 24 hours. **            I have utilized all available immediate resources to obtain, update, or review the patient's current medications (including all prescriptions, over-the-counter products, herbals, cannabis/cannabidiol products, and vitamin/mineral/dietary (nutritional) supplements).       Assessment/Plan     Active  Hospital Problems    Diagnosis     **Lower extremity weakness     Depression     Adjustment disorder with depressed mood     Cannabis abuse     Positive urine drug screen for methamphetamine          Assessment and   Plan:      Left lower extremity weakness: MRI spine: Abnormal T2 hyperintensity within the dorsal column of the thoracic spinal cord from the T4 level through the T8 level.  No abnormal enhancement.  These findings are nonspecific and may represent a demyelinating process with infectious/viral myelitis also a consideration.  Neoplasm unlikely. Tele neurology consulted and ordered LP,  CSF chemistry results normal, CSF meningitis panel normal, will follow tele neurology recommendations, ready to be discharge to rehab, pending placement by .     Suicidal ideation:  - Followed by psych. Resolved. Reportedly cleared by psych.        Polysubstance abuse:  - Patient positive for methamphetamines 9/13/2023.  Patient's urine drug screen negative for methamphetamines 9/17.  Recommend patient stop using recreational drugs.       Hypertension: Continue home medications    Medical Decision Making  Number and Complexity of problems: 3  Differential Diagnosis: myelitis    Conditions and Status:        Condition is improving.     MDM Data  External documents reviewed: previous medical records  My EKG interpretation: sinus rhythm  My plain film interpretation: MRI spine thoracic  Tests considered but not ordered: none     Decision rules/scores evaluated (example [id number], Wells, etc): n/a     Discussed with: the patient and neurology     Treatment Plan  Pending placement  Outpatient follow up by Justo GALINDO  Care Planning  Shared decision making: patient  Code status and discussions: full code  Code Status and Medical Interventions:   Ordered at: 09/18/23 0231     Level Of Support Discussed With:    Patient     Code Status (Patient has no pulse and is not breathing):    CPR (Attempt to  Resuscitate)     Medical Interventions (Patient has pulse or is breathing):    Full Support     Disposition  Social Determinants of Health that impact treatment or disposition: none  I expect the patient to be discharged to rehab in 1 days.       I confirmed that the patient's Advance Care Plan is present, code status is documented, or surrogate decision maker is listed in the patient's medical record.          This document has been electronically signed by Howard Núñez MD on September 23, 2023 09:38 CDT

## 2023-09-23 NOTE — THERAPY TREATMENT NOTE
Patient Name: Hanna Escalante  : 1965    MRN: [id number]                              Today's Date: 2023       Admit Date: 2023    Visit Dx:     ICD-10-CM ICD-9-CM   1. Weakness of left lower extremity  R29.898 729.89   2. Impaired functional mobility, balance, gait, and endurance [Z74.09 (ICD-10-CM)]  Z74.09 V49.89   3. Impaired mobility and ADLs [Z74.09, Z78.9 (ICD-10-CM)]  Z74.09 V49.89    Z78.9      Patient Active Problem List   Diagnosis    Screening for malignant neoplasm of colon    Neck pain    Tobacco dependence    Insomnia    Back pain    Lower extremity weakness    Depression    Adjustment disorder with depressed mood    Cannabis abuse    Positive urine drug screen for methamphetamine     Past Medical History:   Diagnosis Date    Allergic     Arthritis     COPD (chronic obstructive pulmonary disease)     GERD (gastroesophageal reflux disease)     Hypertension     Infectious viral hepatitis     Low back pain      Past Surgical History:   Procedure Laterality Date    CHOLECYSTECTOMY      COLONOSCOPY N/A 2020    Procedure: COLONOSCOPY;  Surgeon: Joshua Perry MD;  Location: [facility] ENDOSCOPY;  Service: General    HYSTERECTOMY        General Information       Row Name 23 1497          OT Time and Intention    Document Type therapy note (daily note)  -LM     Mode of Treatment individual therapy;occupational therapy  -LM               User Key  (r) = Recorded By, (t) = Taken By, (c) = Cosigned By      Initials Name Provider Type    LM Radha Eli COTA Occupational Therapist Assistant                     Mobility/ADL's       Row Name 23 9087          Bed Mobility    Bed Mobility bed mobility (all) activities  -LM     All Activities, [place] (Bed Mobility) contact guard  -LM     Rolling Left [place] (Bed Mobility) modified independence  -LM     Rolling Right [place] (Bed Mobility) modified independence  -LM     Supine-Sit [place] (Bed  Mobility) modified independence  -LM       Row Name 09/23/23 1647          Transfers    Transfers bed-chair transfer;sit-stand transfer;stand-sit transfer  -LM       Row Name 09/23/23 1647          Bed-Chair Transfer    Bed-Chair [place] (Transfers) contact guard  -LM       Row Name 09/23/23 1647          Sit-Stand Transfer    Sit-Stand [place] (Transfers) contact guard;minimum assist (75% patient effort)  -LM       Row Name 09/23/23 1647          Stand-Sit Transfer    Stand-Sit [place] (Transfers) contact guard;minimum assist (75% patient effort)  -LM               User Key  (r) = Recorded By, (t) = Taken By, (c) = Cosigned By      Initials Name Provider Type    LM Radha Eli COTA Occupational Therapist Assistant                   Obj/Interventions    No documentation.                  Goals/Plan       Row Name 09/23/23 1650          Transfer Goal 1 (OT)    Activity/Assistive Device (Transfer Goal 1, OT) toilet  -LM     [place] Level/Cues Needed (Transfer Goal 1, OT) supervision required  -LM     Time Frame (Transfer Goal 1, OT) long term goal (LTG);by discharge  -LM     Progress/Outcome (Transfer Goal 1, OT) goal not met  -LM       Row Name 09/23/23 1650          Bathing Goal 1 (OT)    Activity/Device (Bathing Goal 1, OT) lower body bathing  -LM     [place] Level/Cues Needed (Bathing Goal 1, OT) supervision required  -LM     Time Frame (Bathing Goal 1, OT) long term goal (LTG);by discharge  -LM     Progress/Outcomes (Bathing Goal 1, OT) goal not met  -LM       Row Name 09/23/23 1650          Dressing Goal 1 (OT)    Activity/Device (Dressing Goal 1, OT) lower body dressing  -LM     [place]/Cues Needed (Dressing Goal 1, OT) supervision required  -LM     Time Frame (Dressing Goal 1, OT) long term goal (LTG);by discharge  -LM     Progress/Outcome (Dressing Goal 1, OT) goal not met  -LM       Row Name 09/23/23 1650          Problem Specific Goal 1 (OT)    Problem Specific Goal 1  (OT) Pt will tolerate at least 4 continuous minutes of standing ADL with SPV or less and zero LOB for increased balance required for ADLs and mobility.  -LM     Time Frame (Problem Specific Goal 1, OT) long term goal (LTG);by discharge  -LM     Progress/Outcome (Problem Specific Goal 1, OT) goal not met  -LM               User Key  (r) = Recorded By, (t) = Taken By, (c) = Cosigned By      Initials Name Provider Type    Radha Eugene COTA Occupational Therapist Assistant                   Clinical Impression       Row Name 09/23/23 1649          Pain Assessment    Pretreatment Pain Rating 5/10  -LM     Posttreatment Pain Rating 5/10  -LM     Pain Intervention(s) Medication (See MAR)  -LM       Row Name 09/23/23 1649          Plan of Care Review    Plan of Care Reviewed With patient  -LM     Progress improving  -LM     Outcome Evaluation pt in sup at exit  perf bed mob with mod I sitting eob mod I  balance act mod I  sit to stand with cga- min a for safety  tf to recliner with min a for safety and sharma with call light and all needs also chair alarm in chair at this time  -LM       Row Name 09/23/23 1649          Positioning and Restraints    Pre-Treatment Position in bed  -LM     Post Treatment Position bed  -LM               User Key  (r) = Recorded By, (t) = Taken By, (c) = Cosigned By      Initials Name Provider Type    Radha Eugene COTA Occupational Therapist Assistant                   Outcome Measures       Row Name 09/23/23 1659          How much help from another is currently needed...    Putting on and taking off regular lower body clothing? 3  -LM     Bathing (including washing, rinsing, and drying) 3  -LM     Toileting (which includes using toilet bed pan or urinal) 3  -LM     Putting on and taking off regular upper body clothing 4  -LM     Taking care of personal grooming (such as brushing teeth) 4  -LM     Eating meals 4  -LM     AM-PAC 6 Clicks Score (OT) 21  -LM       Row Name 09/23/23  0842          How much help from another person do you currently need...    Turning from your back to your side while in flat bed without using bedrails? 4  -LW     Moving from lying on back to sitting on the side of a flat bed without bedrails? 4  -LW     Moving to and from a bed to a chair (including a wheelchair)? 3  -LW     Standing up from a chair using your arms (e.g., wheelchair, bedside chair)? 3  -LW     Climbing 3-5 steps with a railing? 3  -LW     To walk in hospital room? 3  -LW     AM-PAC 6 Clicks Score (PT) 20  -LW     Highest level of mobility 6 --> Walked 10 steps or more  -LW               User Key  (r) = Recorded By, (t) = Taken By, (c) = Cosigned By      Initials Name Provider Type    Radha Eugene COTA Occupational Therapist Assistant    Aimee Mcadams, RN Registered Nurse                    Occupational Therapy Education       Title: PT OT SLP Therapies (In Progress)       Topic: Occupational Therapy (In Progress)       Point: ADL training (Done)       Description:   Instruct learner(s) on proper safety adaptation and remediation techniques during self care or transfers.   Instruct in proper use of assistive devices.                  Learning Progress Summary             Patient Acceptance, E,TB, VU by CM at 9/20/2023 3767    Comment: OT POC, Role of OT, d/c recommendations                         Point: Home exercise program (Not Started)       Description:   Instruct learner(s) on appropriate technique for monitoring, assisting and/or progressing therapeutic exercises/activities.                  Learner Progress:  Not documented in this visit.              Point: Precautions (Not Started)       Description:   Instruct learner(s) on prescribed precautions during self-care and functional transfers.                  Learner Progress:  Not documented in this visit.              Point: Body mechanics (Not Started)       Description:   Instruct learner(s) on proper positioning and  spine alignment during self-care, functional mobility activities and/or exercises.                  Learner Progress:  Not documented in this visit.                              User Key       Initials Effective Dates Name Provider Type Discipline     11/18/22 -  Perlita Rich OT Occupational Therapist OT                  OT Recommendation and Plan     Plan of Care Review  Plan of Care Reviewed With: patient  Progress: improving  Outcome Evaluation: pt in sup at exit  perf bed mob with mod I sitting eob mod I  balance act mod I  sit to stand with cga- min a for safety  tf to recliner with min a for safety and sharma with call light and all needs also chair alarm in chair at this time     Time Calculation:         Time Calculation- OT       Row Name 09/23/23 1645             Time Calculation- OT    OT Start Time 1627  -LM      OT Stop Time 1643  -LM      OT Time Calculation (min) 16 min  -LM      Total Timed Code Minutes- OT 16 minute(s)  -LM      OT Received On 09/23/23  -LM         Timed Charges    57189 - OT Self Care/Mgmt Minutes 16  -LM         Total Minutes    Timed Charges Total Minutes 16  -LM       Total Minutes 16  -LM                User Key  (r) = Recorded By, (t) = Taken By, (c) = Cosigned By      Initials Name Provider Type    LM Radha Eli COTA Occupational Therapist Assistant                  Therapy Charges for Today       Code Description Service Date Service Provider Modifiers Qty    [id number] HC OT SELF CARE/MGMT/TRAIN EA 15 MIN 9/23/2023 Radha Eli COTA GO 1                 LINDSEY Dial  9/23/2023

## 2023-09-23 NOTE — PLAN OF CARE
Goal Outcome Evaluation:  Plan of Care Reviewed With: patient        Progress: improving  Outcome Evaluation: pt in sup at exit  perf bed mob with mod I sitting eob mod I  balance act mod I  sit to stand with cga- min a for safety  tf to recliner with min a for safety and sharma with call light and all needs also chair alarm in chair at this time

## 2023-09-23 NOTE — PLAN OF CARE
Goal Outcome Evaluation:  Patient resting between care. Complaints of pain.PRN morphine given . VSS. Awaiting placement.Call light and phone in reach.

## 2023-09-24 LAB — EBV DNA SPEC QL NAA+PROBE: NEGATIVE

## 2023-09-24 PROCEDURE — 25010000002 METOCLOPRAMIDE PER 10 MG

## 2023-09-24 PROCEDURE — 25010000002 ENOXAPARIN PER 10 MG: Performed by: INTERNAL MEDICINE

## 2023-09-24 PROCEDURE — 25010000002 MORPHINE PER 10 MG: Performed by: INTERNAL MEDICINE

## 2023-09-24 RX ORDER — HYDROCODONE BITARTRATE AND ACETAMINOPHEN 7.5; 325 MG/1; MG/1
1 TABLET ORAL EVERY 4 HOURS PRN
Status: DISCONTINUED | OUTPATIENT
Start: 2023-09-24 | End: 2023-09-30 | Stop reason: HOSPADM

## 2023-09-24 RX ADMIN — MORPHINE SULFATE 2 MG: 2 INJECTION, SOLUTION INTRAMUSCULAR; INTRAVENOUS at 05:14

## 2023-09-24 RX ADMIN — HYDROCODONE BITARTRATE AND ACETAMINOPHEN 1 TABLET: 7.5; 325 TABLET ORAL at 22:21

## 2023-09-24 RX ADMIN — Medication 10 ML: at 21:33

## 2023-09-24 RX ADMIN — DOCUSATE SODIUM 50 MG AND SENNOSIDES 8.6 MG 2 TABLET: 8.6; 5 TABLET, FILM COATED ORAL at 09:54

## 2023-09-24 RX ADMIN — LISINOPRIL 10 MG: 10 TABLET ORAL at 21:31

## 2023-09-24 RX ADMIN — METOCLOPRAMIDE HYDROCHLORIDE 10 MG: 5 INJECTION INTRAMUSCULAR; INTRAVENOUS at 21:31

## 2023-09-24 RX ADMIN — MORPHINE SULFATE 2 MG: 2 INJECTION, SOLUTION INTRAMUSCULAR; INTRAVENOUS at 14:23

## 2023-09-24 RX ADMIN — DOCUSATE SODIUM 50 MG AND SENNOSIDES 8.6 MG 2 TABLET: 8.6; 5 TABLET, FILM COATED ORAL at 21:31

## 2023-09-24 RX ADMIN — MORPHINE SULFATE 2 MG: 2 INJECTION, SOLUTION INTRAMUSCULAR; INTRAVENOUS at 09:54

## 2023-09-24 RX ADMIN — CYCLOBENZAPRINE HYDROCHLORIDE 10 MG: 10 TABLET, FILM COATED ORAL at 22:21

## 2023-09-24 RX ADMIN — MORPHINE SULFATE 2 MG: 2 INJECTION, SOLUTION INTRAMUSCULAR; INTRAVENOUS at 22:56

## 2023-09-24 RX ADMIN — METOCLOPRAMIDE HYDROCHLORIDE 10 MG: 5 INJECTION INTRAMUSCULAR; INTRAVENOUS at 01:28

## 2023-09-24 RX ADMIN — DULOXETINE HYDROCHLORIDE 20 MG: 20 CAPSULE, DELAYED RELEASE ORAL at 09:54

## 2023-09-24 RX ADMIN — ENOXAPARIN SODIUM 40 MG: 40 INJECTION SUBCUTANEOUS at 09:54

## 2023-09-24 RX ADMIN — LISINOPRIL 10 MG: 10 TABLET ORAL at 09:54

## 2023-09-24 RX ADMIN — Medication 10 ML: at 10:44

## 2023-09-24 RX ADMIN — METOCLOPRAMIDE HYDROCHLORIDE 10 MG: 5 INJECTION INTRAMUSCULAR; INTRAVENOUS at 09:54

## 2023-09-24 RX ADMIN — NICOTINE 1 PATCH: 21 PATCH, EXTENDED RELEASE TRANSDERMAL at 21:31

## 2023-09-24 RX ADMIN — MORPHINE SULFATE 2 MG: 2 INJECTION, SOLUTION INTRAMUSCULAR; INTRAVENOUS at 18:16

## 2023-09-24 NOTE — PLAN OF CARE
Goal Outcome Evaluation:  Patient resting in bed with eyes closed. C/o of pain. PRN medication given . VSS . Call light and phone in reach.

## 2023-09-24 NOTE — PROGRESS NOTES
[facility] Medicine Services  INPATIENT PROGRESS NOTE    Length of Stay: 0  Date of Admission: 9/17/2023  Primary Care Physician: Ninfa Bhatti APRN    Subjective   Chief Complaint: none  HPI:   History of Present Illness  Patient with past medical history of COPD, GERD, hypertension, arthritis presents with left lower extremity weakness.  Patient states that lower extremity weakness began 3 days prior to admission.      Patient reportedly admitted to history of substance abuse, and was reportedly positive for methamphetamines at admission. No loose of bowel or urine, no fever, no urinary symptoms or GI symptoms. CT abdomen/pelvis shows no constipation, or acute abdominal signs.    She reports that she is again feeling some better today, but she is not at baseline.    Review of Systems     Objective    Temp:  [97.7 °F (36.5 °C)-98.6 °F (37 °C)] 97.7 °F (36.5 °C)  Heart Rate:  [64-97] 64  Resp:  [18] 18  BP: (108-145)/(57-89) 127/71    AM-PAC 6 Clicks Score (PT): 20 (09/23/23 0842)    As of today, 09/24/23  Physical Exam  Constitutional:       Appearance: Normal appearance. She is obese.   HENT:      Head: Normocephalic and atraumatic.      Right Ear: Tympanic membrane normal.      Left Ear: Tympanic membrane normal.      Nose: Nose normal.      Mouth/Throat:      Mouth: Mucous membranes are moist.   Eyes:      Pupils: Pupils are equal, round, and reactive to light.   Cardiovascular:      Rate and Rhythm: Normal rate and regular rhythm.   Pulmonary:      Effort: Pulmonary effort is normal.      Breath sounds: Normal breath sounds.   Abdominal:      General: Abdomen is flat. Bowel sounds are normal.      Palpations: Abdomen is soft.   Musculoskeletal:         General: Normal range of motion.      Cervical back: Neck supple.   Skin:     General: Skin is warm and dry.      Capillary Refill: Capillary refill takes less than 2 seconds.   Neurological:      General: No  focal deficit present.      Mental Status: She is alert and oriented to person, place, and time.   Psychiatric:         Mood and Affect: Mood normal.         Behavior: Behavior normal.     Results Review:  I have reviewed the labs, radiology results, and diagnostic studies.    Laboratory Data:   Results from last 7 days   Lab Units 09/21/23  0620 09/20/23  0511 09/19/23  0556 09/17/23  1843   SODIUM mmol/L 137 137 138 139   POTASSIUM mmol/L 4.5 4.1 4.0 4.2   CHLORIDE mmol/L 101 101 103 103   CO2 mmol/L 28.0 28.0 25.0 26.0   BUN mg/dL 25* 16 22* 22*   CREATININE mg/dL 0.98 0.92 0.88 1.05*   GLUCOSE mg/dL 101* 101* 107* 108*   CALCIUM mg/dL 9.3 8.9 8.8 9.0   BILIRUBIN mg/dL  --   --   --  0.5   ALK PHOS U/L  --   --   --  129*   ALT (SGPT) U/L  --   --   --  41*   AST (SGOT) U/L  --   --   --  37*   ANION GAP mmol/L 8.0 8.0 10.0 10.0       Estimated Creatinine Clearance: 55.2 mL/min (by C-G formula based on SCr of 0.98 mg/dL).  Results from last 7 days   Lab Units 09/19/23  0556 09/17/23  1843   MAGNESIUM mg/dL 1.9 2.0           Results from last 7 days   Lab Units 09/21/23  0620 09/20/23  0511 09/19/23  0556 09/18/23  0831 09/17/23  1843   WBC 10*3/mm3 6.02 6.43 5.78 6.91 8.17   HEMOGLOBIN g/dL 14.6 14.2 14.7 15.0 16.4*   HEMATOCRIT % 43.8 42.4 43.5 43.0 47.4*   PLATELETS 10*3/mm3 201 212 195 195 236             Culture Data:   No results found for: BLOODCX  No results found for: URINECX  No results found for: RESPCX  No results found for: WOUNDCX  No results found for: STOOLCX  No components found for: BODYFLD    Radiology Data:   Imaging Results (Last 24 Hours)       ** No results found for the last 24 hours. **            I have utilized all available immediate resources to obtain, update, or review the patient's current medications (including all prescriptions, over-the-counter products, herbals, cannabis/cannabidiol products, and vitamin/mineral/dietary (nutritional) supplements).       Assessment/Plan      Active Hospital Problems    Diagnosis     **Lower extremity weakness     Depression     Adjustment disorder with depressed mood     Cannabis abuse     Positive urine drug screen for methamphetamine          Assessment and   Plan:      Left lower extremity weakness: MRI spine: Abnormal T2 hyperintensity within the dorsal column of the thoracic spinal cord from the T4 level through the T8 level.  No abnormal enhancement.  These findings are nonspecific and may represent a demyelinating process with infectious/viral myelitis also a consideration.  Neoplasm unlikely. Tele neurology consulted and ordered LP,  CSF chemistry results normal, CSF meningitis panel normal, will follow tele neurology recommendations, ready to be discharge to rehab, pending placement by .     Suicidal ideation:  - Followed by psych. Resolved. Reportedly cleared by psych.        Polysubstance abuse:  - Patient positive for methamphetamines 9/13/2023.  Patient's urine drug screen negative for methamphetamines 9/17.  Recommend patient stop using recreational drugs.       Hypertension: Continue home medications    Medical Decision Making  Number and Complexity of problems: 3  Differential Diagnosis: myelitis    Conditions and Status:        Condition is improving.     MDM Data  External documents reviewed: previous medical records  My EKG interpretation: sinus rhythm  My plain film interpretation: MRI spine thoracic  Tests considered but not ordered: none     Decision rules/scores evaluated (example [id number], Wells, etc): n/a     Discussed with: the patient and neurology     Treatment Plan  Pending placement  Outpatient follow up by Justo GALINDO  Care Planning  Shared decision making: patient  Code status and discussions: full code  Code Status and Medical Interventions:   Ordered at: 09/18/23 0231     Level Of Support Discussed With:    Patient     Code Status (Patient has no pulse and is not breathing):    CPR (Attempt to  Resuscitate)     Medical Interventions (Patient has pulse or is breathing):    Full Support     Disposition  Social Determinants of Health that impact treatment or disposition: none  I expect the patient to be discharged to rehab in 1 days.       I confirmed that the patient's Advance Care Plan is present, code status is documented, or surrogate decision maker is listed in the patient's medical record.          This document has been electronically signed by Howard Núñez MD on September 24, 2023 11:45 CDT

## 2023-09-25 PROCEDURE — 25010000002 ENOXAPARIN PER 10 MG: Performed by: INTERNAL MEDICINE

## 2023-09-25 PROCEDURE — 25010000002 MORPHINE PER 10 MG: Performed by: INTERNAL MEDICINE

## 2023-09-25 PROCEDURE — 97116 GAIT TRAINING THERAPY: CPT

## 2023-09-25 PROCEDURE — 97535 SELF CARE MNGMENT TRAINING: CPT

## 2023-09-25 PROCEDURE — 97110 THERAPEUTIC EXERCISES: CPT

## 2023-09-25 PROCEDURE — 25010000002 METOCLOPRAMIDE PER 10 MG

## 2023-09-25 PROCEDURE — 97530 THERAPEUTIC ACTIVITIES: CPT

## 2023-09-25 RX ORDER — MORPHINE SULFATE 2 MG/ML
2 INJECTION, SOLUTION INTRAMUSCULAR; INTRAVENOUS ONCE
Status: COMPLETED | OUTPATIENT
Start: 2023-09-25 | End: 2023-09-25

## 2023-09-25 RX ADMIN — HYDROCODONE BITARTRATE AND ACETAMINOPHEN 1 TABLET: 7.5; 325 TABLET ORAL at 21:02

## 2023-09-25 RX ADMIN — CYCLOBENZAPRINE HYDROCHLORIDE 10 MG: 10 TABLET, FILM COATED ORAL at 21:00

## 2023-09-25 RX ADMIN — Medication 10 ML: at 21:01

## 2023-09-25 RX ADMIN — METOCLOPRAMIDE HYDROCHLORIDE 10 MG: 5 INJECTION INTRAMUSCULAR; INTRAVENOUS at 02:42

## 2023-09-25 RX ADMIN — METOCLOPRAMIDE HYDROCHLORIDE 10 MG: 5 INJECTION INTRAMUSCULAR; INTRAVENOUS at 21:00

## 2023-09-25 RX ADMIN — ENOXAPARIN SODIUM 40 MG: 40 INJECTION SUBCUTANEOUS at 10:16

## 2023-09-25 RX ADMIN — METOCLOPRAMIDE HYDROCHLORIDE 10 MG: 5 INJECTION INTRAMUSCULAR; INTRAVENOUS at 14:59

## 2023-09-25 RX ADMIN — DOCUSATE SODIUM 50 MG AND SENNOSIDES 8.6 MG 2 TABLET: 8.6; 5 TABLET, FILM COATED ORAL at 07:58

## 2023-09-25 RX ADMIN — LISINOPRIL 10 MG: 10 TABLET ORAL at 21:00

## 2023-09-25 RX ADMIN — NICOTINE 1 PATCH: 21 PATCH, EXTENDED RELEASE TRANSDERMAL at 21:01

## 2023-09-25 RX ADMIN — Medication 10 ML: at 09:00

## 2023-09-25 RX ADMIN — MORPHINE SULFATE 2 MG: 2 INJECTION, SOLUTION INTRAMUSCULAR; INTRAVENOUS at 02:42

## 2023-09-25 RX ADMIN — HYDROCODONE BITARTRATE AND ACETAMINOPHEN 1 TABLET: 7.5; 325 TABLET ORAL at 08:05

## 2023-09-25 RX ADMIN — METOCLOPRAMIDE HYDROCHLORIDE 10 MG: 5 INJECTION INTRAMUSCULAR; INTRAVENOUS at 07:58

## 2023-09-25 RX ADMIN — DULOXETINE HYDROCHLORIDE 20 MG: 20 CAPSULE, DELAYED RELEASE ORAL at 07:57

## 2023-09-25 RX ADMIN — LISINOPRIL 10 MG: 10 TABLET ORAL at 07:58

## 2023-09-25 RX ADMIN — DOCUSATE SODIUM 50 MG AND SENNOSIDES 8.6 MG 2 TABLET: 8.6; 5 TABLET, FILM COATED ORAL at 21:00

## 2023-09-25 RX ADMIN — HYDROCODONE BITARTRATE AND ACETAMINOPHEN 1 TABLET: 7.5; 325 TABLET ORAL at 14:59

## 2023-09-25 NOTE — PROGRESS NOTES
[facility] Medicine Services  INPATIENT PROGRESS NOTE    Length of Stay: 1  Date of Admission: 9/17/2023  Primary Care Physician: Ninfa Bhatti APRN    Subjective   Chief Complaint: Left lower extremity weakness  HPI: Patient states his better.  Working with therapy.    As of today, 09/25/23  Review of Systems   Constitutional:  Negative for appetite change, chills, fatigue, fever and unexpected weight change.   Respiratory:  Negative for cough, choking, chest tightness, shortness of breath and wheezing.    Cardiovascular:  Negative for chest pain, palpitations and leg swelling.   Gastrointestinal:  Negative for abdominal pain, blood in stool, constipation, diarrhea, nausea and vomiting.   Genitourinary:  Negative for dysuria, flank pain and hematuria.   Neurological:  Negative for dizziness, seizures, syncope, speech difficulty, weakness, light-headedness, numbness and headaches.   Hematological:  Does not bruise/bleed easily.      All pertinent negatives and positives are as above. All other systems have been reviewed and are negative unless otherwise stated.    Objective    Temp:  [97.3 °F (36.3 °C)-98.2 °F (36.8 °C)] 97.7 °F (36.5 °C)  Heart Rate:  [68-78] 72  Resp:  [18] 18  BP: ()/(52-87) 109/53    AM-PAC 6 Clicks Score (PT): 20 (09/25/23 0805)    As of today, 09/25/23  Physical Exam  Vitals reviewed.   Constitutional:       Appearance: She is well-developed.   HENT:      Head: Normocephalic and atraumatic.   Eyes:      Pupils: Pupils are equal, round, and reactive to light.   Cardiovascular:      Rate and Rhythm: Normal rate and regular rhythm.      Heart sounds: Normal heart sounds. No murmur heard.    No friction rub. No gallop.   Pulmonary:      Effort: Pulmonary effort is normal. No respiratory distress.      Breath sounds: Normal breath sounds. No wheezing or rales.   Chest:      Chest wall: No tenderness.   Abdominal:      General: Bowel sounds  are normal. There is no distension.      Palpations: Abdomen is soft.      Tenderness: There is no abdominal tenderness. There is no guarding.   Musculoskeletal:      Cervical back: Normal range of motion and neck supple.   Skin:     General: Skin is warm and dry.   Psychiatric:         Behavior: Behavior normal.         Thought Content: Thought content normal.         Results Review:  I have reviewed the labs, radiology results, and diagnostic studies.    Laboratory Data:   Results from last 7 days   Lab Units 09/21/23  0620 09/20/23  0511 09/19/23  0556   SODIUM mmol/L 137 137 138   POTASSIUM mmol/L 4.5 4.1 4.0   CHLORIDE mmol/L 101 101 103   CO2 mmol/L 28.0 28.0 25.0   BUN mg/dL 25* 16 22*   CREATININE mg/dL 0.98 0.92 0.88   GLUCOSE mg/dL 101* 101* 107*   CALCIUM mg/dL 9.3 8.9 8.8   ANION GAP mmol/L 8.0 8.0 10.0     Estimated Creatinine Clearance: 56.6 mL/min (by C-G formula based on SCr of 0.98 mg/dL).  Results from last 7 days   Lab Units 09/19/23  0556   MAGNESIUM mg/dL 1.9         Results from last 7 days   Lab Units 09/21/23  0620 09/20/23  0511 09/19/23  0556   WBC 10*3/mm3 6.02 6.43 5.78   HEMOGLOBIN g/dL 14.6 14.2 14.7   HEMATOCRIT % 43.8 42.4 43.5   PLATELETS 10*3/mm3 201 212 195           Culture Data:   No results found for: BLOODCX  No results found for: URINECX  No results found for: RESPCX  No results found for: WOUNDCX  No results found for: STOOLCX  No components found for: BODYFLD    Radiology Data:   Imaging Results (Last 24 Hours)       ** No results found for the last 24 hours. **            I have utilized all available immediate resources to obtain, update, or review the patient's current medications (including all prescriptions, over-the-counter products, herbals, cannabis/cannabidiol products, and vitamin/mineral/dietary (nutritional) supplements).       Assessment/Plan     Active Hospital Problems    Diagnosis     **Lower extremity weakness     Depression     Adjustment disorder with  depressed mood     Cannabis abuse     Positive urine drug screen for methamphetamine        Plan:  1.  Left lower extremity weakness: Work-up negative so far.  Continue therapy.  Placement pending.  2.  Suicidal ideation: Resolved.  Cleared by psych.  3.  History of polysubstance abuse: Counseled.  4.  Hypertension: Continue current treatment.  5.  DVT prophylaxis: Lovenox.      Medical Decision Making  Number and Complexity of problems: 2 highly complex and two moderately complex medical problems    Conditions and Status:        Condition is improving.     Discussed with: Patient and nursing     Treatment Plan  As above    Care Planning  Shared decision making: Patient in agreement plan of care  Code status and discussions: Full code    Disposition  Social Determinants of Health that impact treatment or disposition: None  I expect the patient to be discharged to rehab when accepted.      The patient was evaluated during the global COVID-19 pandemic, and the diagnosis was suspected/considered upon their initial presentation.  Evaluation, treatment, and testing were consistent with current guidelines for patients who present with complaints or symptoms that may be related to COVID-19.    I confirmed that the patient's Advance Care Plan is present, code status is documented, or surrogate decision maker is listed in the patient's medical record.        This document has been electronically signed by Amrit Briggs MD on September 25, 2023 16:07 CDT

## 2023-09-25 NOTE — PLAN OF CARE
Goal Outcome Evaluation: Pt vitals are stable. Pt mood/affect WDL this shift. Pt abdomen pain has been controlled with PO pain medication. Lovenox adm. Pt resting between care. Pt voices no concerns at this time                           51.5

## 2023-09-25 NOTE — THERAPY TREATMENT NOTE
Acute Care - Physical Therapy Treatment Note  [facility]     Patient Name: Hanna Escalante  : 1965  MRN: [id number]  Today's Date: 2023      Visit Dx:     ICD-10-CM ICD-9-CM   1. Weakness of left lower extremity  R29.898 729.89   2. Impaired functional mobility, balance, gait, and endurance [Z74.09 (ICD-10-CM)]  Z74.09 V49.89   3. Impaired mobility and ADLs [Z74.09, Z78.9 (ICD-10-CM)]  Z74.09 V49.89    Z78.9      Patient Active Problem List   Diagnosis    Screening for malignant neoplasm of colon    Neck pain    Tobacco dependence    Insomnia    Back pain    Lower extremity weakness    Depression    Adjustment disorder with depressed mood    Cannabis abuse    Positive urine drug screen for methamphetamine     Past Medical History:   Diagnosis Date    Allergic     Arthritis     COPD (chronic obstructive pulmonary disease)     GERD (gastroesophageal reflux disease)     Hypertension     Infectious viral hepatitis     Low back pain      Past Surgical History:   Procedure Laterality Date    CHOLECYSTECTOMY      COLONOSCOPY N/A 2020    Procedure: COLONOSCOPY;  Surgeon: Joshua ePrry MD;  Location: [facility] ENDOSCOPY;  Service: General    HYSTERECTOMY       PT Assessment (last 12 hours)       PT Evaluation and Treatment       Row Name 2393          Physical Therapy Time and Intention    Subjective Information no complaints  -CA     Document Type therapy note (daily note)  -CA     Mode of Treatment individual therapy;physical therapy  -CA       Row Name 2340          General Information    Existing Precautions/Restrictions fall  -CA       Row Name 2340          Pain    Pretreatment Pain Rating 5/10  -CA     Posttreatment Pain Rating 6/10  -CA     Pain Location - Side/Orientation Left  -CA     Pain Location - flank;abdomen  -CA       Row Name 2340          Cognition    Orientation Status (Cognition) oriented x 4  -CA       Row Name 2332           Bed Mobility    Bed Mobility supine-sit;sit-supine;scooting/bridging  -CA     Scooting/Bridging [place] (Bed Mobility) modified independence  -CA     Supine-Sit [place] (Bed Mobility) modified independence  -CA     Sit-Supine [place] (Bed Mobility) modified independence  -CA       Row Name 09/25/23 0940          Transfers    Transfers sit-stand transfer;stand-sit transfer;bed-chair transfer  -CA       Row Name 09/25/23 0940          Bed-Chair Transfer    Bed-Chair [facility] (Transfers) contact guard  -CA     Assistive Device (Bed-Chair Transfers) walker, front-wheeled  -CA       Row Name 09/25/23 0940          Sit-Stand Transfer    Sit-Stand [facility] (Transfers) contact guard  -CA     Assistive Device (Sit-Stand Transfers) walker, front-wheeled  -CA       Row Name 09/25/23 0940          Stand-Sit Transfer    Stand-Sit [facility] [facility] (Transfers) contact guard  -CA     Assistive Device (Stand-Sit Transfers) walker, front-wheeled  -CA       Row Name 09/25/23 0940          Gait/Stairs (Locomotion)    [facility] Level (Gait) contact guard  -CA     Assistive Device (Gait) walker, front-wheeled  -CA     Distance in Feet (Gait) 22' x 2  -CA     Pattern (Gait) step-to  -CA     Deviations/Abnormal Patterns (Gait) stride length decreased;weight shifting decreased;left sided deviations  -CA     Bilateral Gait Deviations forward flexed posture  -CA     Left Sided Gait Deviations --  ataxic  -CA       Row Name 09/25/23 0940          Motor Skills    Therapeutic Exercise knee;ankle  -CA       Row Name 09/25/23 0940          Knee (Therapeutic Exercise)    Knee (Therapeutic Exercise) AROM (active range of motion)  -CA     Knee AROM (Therapeutic Exercise) other (see comments)  quad sets  -CA       Row Name 09/25/23 0940          Ankle (Therapeutic Exercise)    Ankle (Therapeutic Exercise) AROM (active range of motion)  -CA     Ankle AROM (Therapeutic Exercise) bilateral;dorsiflexion;plantarflexion  -CA       Row  Name 09/25/23 0940          Vital Signs    Pre Patient Position Supine  -CA     Intra Patient Position Standing  -CA     Post Patient Position Sitting  -CA       Row Name 09/25/23 0940          Positioning and Restraints    Pre-Treatment Position in bed  -CA     Post Treatment Position chair  -CA     In Chair reclined;call light within reach;encouraged to call for assist  -CA               User Key  (r) = Recorded By, (t) = Taken By, (c) = Cosigned By      Initials Name Provider Type    CA Dany Whitaker, PTA Physical Therapist Assistant                    Physical Therapy Education       Title: PT OT SLP Therapies (In Progress)       Topic: Physical Therapy (In Progress)       Point: Mobility training (In Progress)       Learning Progress Summary             Patient Acceptance, E, NR by  at 9/20/2023 1052    Comment: PT POC, hand  placenent with transfers, rehab process.                         Point: Home exercise program (Not Started)       Learner Progress:  Not documented in this visit.              Point: Body mechanics (Not Started)       Learner Progress:  Not documented in this visit.              Point: Precautions (Not Started)       Learner Progress:  Not documented in this visit.                              User Key       Initials Effective Dates Name Provider Type Discipline     07/11/23 -  Raul Leung, PT Physical Therapist PT                  PT Recommendation and Plan     Plan of Care Reviewed With: patient  Progress: improving  Outcome Evaluation: Pt. was mod (I) for bed mobility and for sitting balance eob.  Pt. was cga for transfers and gait this tx. and amb. 22' x 2 in room with RW and ataxic with L LE and increased time for gait.  Pt. took seated rest break between gait trips and performed a couple LE therex in recliner post tx.  No new goals met and pt. would benefit from continued skilled PT.       Time Calculation:    PT Charges       Row Name 09/25/23 1004             Time  Calculation    Start Time 0940  -CA      Stop Time 1004  -CA      Time Calculation (min) 24 min  -CA      PT Received On 09/25/23  -CA         Time Calculation- PT    Total Timed Code Minutes- PT 24 minute(s)  -CA                User Key  (r) = Recorded By, (t) = Taken By, (c) = Cosigned By      Initials Name Provider Type    CA Dany Whitaker PTA Physical Therapist Assistant                  Therapy Charges for Today       Code Description Service Date Service Provider Modifiers Qty    32578882152 HC GAIT TRAINING EA 15 MIN 9/25/2023 Dany Whitaker PTA GP 1    74599212792 HC PT THERAPEUTIC ACT EA 15 MIN 9/25/2023 Dany Whitaker, NEVIN GP 1            PT G-Codes  Outcome Measure Options: AM-PAC 6 Clicks Basic Mobility (PT)  AM-PAC 6 Clicks Score (PT): 20  AM-PAC 6 Clicks Score (OT): 21    Dany Whitaker PTA  9/25/2023

## 2023-09-25 NOTE — PROGRESS NOTES
Asked by RN to review expiring pain medication orders. Per chart review, it is planned for patient to be discharged to a rehab facility within the next two days. I feel in her best interest to switch pain medication to oral form to acclimate her before discharge. Orders placed.

## 2023-09-25 NOTE — THERAPY TREATMENT NOTE
Patient Name: Hanna Escalante  : 1965    MRN: [id number]                              Today's Date: 2023       Admit Date: 2023    Visit Dx:     ICD-10-CM ICD-9-CM   1. Weakness of left lower extremity  R29.898 729.89   2. Impaired functional mobility, balance, gait, and endurance [Z74.09 (ICD-10-CM)]  Z74.09 V49.89   3. Impaired mobility and ADLs [Z74.09, Z78.9 (ICD-10-CM)]  Z74.09 V49.89    Z78.9      Patient Active Problem List   Diagnosis    Screening for malignant neoplasm of colon    Neck pain    Tobacco dependence    Insomnia    Back pain    Lower extremity weakness    Depression    Adjustment disorder with depressed mood    Cannabis abuse    Positive urine drug screen for methamphetamine     Past Medical History:   Diagnosis Date    Allergic     Arthritis     COPD (chronic obstructive pulmonary disease)     GERD (gastroesophageal reflux disease)     Hypertension     Infectious viral hepatitis     Low back pain      Past Surgical History:   Procedure Laterality Date    CHOLECYSTECTOMY      COLONOSCOPY N/A 2020    Procedure: COLONOSCOPY;  Surgeon: Joshua Perry MD;  Location: [facility] ENDOSCOPY;  Service: General    HYSTERECTOMY        General Information       Row Name 23 1450          OT Time and Intention    Document Type therapy note (daily note)  -KD     Mode of Treatment individual therapy;occupational therapy  -KD       Row Name 23 1450          General Information    Patient Profile Reviewed yes  -KD     Existing Precautions/Restrictions fall  -KD       Row Name 23 1450          Cognition    Orientation Status (Cognition) oriented x 4  -KD       Row Name 23 1450          Safety Issues, Functional Mobility    Impairments Affecting Function (Mobility) endurance/activity tolerance;coordination;balance;pain  -KD               User Key  (r) = Recorded By, (t) = Taken By, (c) = Cosigned By      Initials Name Provider Type    KD Theodora Lewis COTA  Occupational Therapist Assistant                     Mobility/ADL's       Row Name 09/25/23 1450          Bed Mobility    Supine-Sit [place] (Bed Mobility) modified independence  -KD     Sit-Supine [place] (Bed Mobility) modified independence  -KD     Assistive Device (Bed Mobility) head of bed elevated;bed rails  -       Row Name 09/25/23 1450          Sit-Stand Transfer    Sit-Stand [place] (Transfers) contact guard  -KD     Assistive Device (Sit-Stand Transfers) walker, front-wheeled  -KD       Row Name 09/25/23 1450          Stand-Sit Transfer    Stand-Sit [place] (Transfers) contact guard  -KD     Assistive Device (Stand-Sit Transfers) walker, front-wheeled  -KD       Row Name 09/25/23 1450          Toilet Transfer    [place] Level (Toilet Transfer) contact guard  -KD     Assistive Device (Toilet Transfer) walker, front-wheeled  -KD       Row Name 09/25/23 1450          Functional Mobility    Functional Mobility- Ind. Level minimum assist (75% patient effort)  -     Functional Mobility- Device walker, front-wheeled  -KD     Functional Mobility-Distance (Feet) 10  -KD               User Key  (r) = Recorded By, (t) = Taken By, (c) = Cosigned By      Initials Name Provider Type     Theodora Lewis COTA Occupational Therapist Assistant                   Obj/Interventions       Row Name 09/25/23 1450          Shoulder (Therapeutic Exercise)    Shoulder (Therapeutic Exercise) AROM (active range of motion)  -     Shoulder AROM (Therapeutic Exercise) bilateral;flexion;extension;aBduction;aDduction;external rotation;internal rotation  -       Row Name 09/25/23 1450          Elbow/Forearm (Therapeutic Exercise)    Elbow/Forearm (Therapeutic Exercise) AROM (active range of motion)  -     Elbow/Forearm AROM (Therapeutic Exercise) bilateral;flexion;extension;supination;pronation  -       Row Name 09/25/23 1450          Wrist (Therapeutic Exercise)    Wrist (Therapeutic Exercise) AROM  (active range of motion)  -KD     Wrist AROM (Therapeutic Exercise) bilateral;flexion;extension  -KD       Row Name 09/25/23 1450          Hand (Therapeutic Exercise)    Hand (Therapeutic Exercise) AROM (active range of motion)  -KD     Hand AROM/AAROM (Therapeutic Exercise) bilateral;finger flexion;finger extension  -KD       Row Name 09/25/23 1450          Motor Skills    Therapeutic Exercise shoulder;elbow/forearm;wrist;hand  -KD               User Key  (r) = Recorded By, (t) = Taken By, (c) = Cosigned By      Initials Name Provider Type     Theodora Lewis COTA Occupational Therapist Assistant                   Goals/Plan       Row Name 09/25/23 1450          Transfer Goal 1 (OT)    Activity/Assistive Device (Transfer Goal 1, OT) toilet  -KD     [place] Level/Cues Needed (Transfer Goal 1, OT) supervision required  -KD     Time Frame (Transfer Goal 1, OT) long term goal (LTG);by discharge  -KD     Progress/Outcome (Transfer Goal 1, OT) goal not met  -KD       Row Name 09/25/23 1450          Bathing Goal 1 (OT)    Activity/Device (Bathing Goal 1, OT) lower body bathing  -KD     [place] Level/Cues Needed (Bathing Goal 1, OT) supervision required  -KD     Time Frame (Bathing Goal 1, OT) long term goal (LTG);by discharge  -KD     Progress/Outcomes (Bathing Goal 1, OT) goal not met  -KD       Row Name 09/25/23 1450          Dressing Goal 1 (OT)    Activity/Device (Dressing Goal 1, OT) lower body dressing  -KD     [place]/Cues Needed (Dressing Goal 1, OT) supervision required  -KD     Time Frame (Dressing Goal 1, OT) long term goal (LTG);by discharge  -KD     Progress/Outcome (Dressing Goal 1, OT) goal not met  -KD       Row Name 09/25/23 1450          Problem Specific Goal 1 (OT)    Problem Specific Goal 1 (OT) Pt will tolerate at least 4 continuous minutes of standing ADL with SPV or less and zero LOB for increased balance required for ADLs and mobility.  -KD     Time Frame (Problem Specific Goal 1,  OT) long term goal (LTG);by discharge  -KD     Progress/Outcome (Problem Specific Goal 1, OT) goal not met  -KD               User Key  (r) = Recorded By, (t) = Taken By, (c) = Cosigned By      Initials Name Provider Type    Theodora Ruiz COTA Occupational Therapist Assistant                   Clinical Impression       Row Name 09/25/23 1450          Pain Assessment    Pretreatment Pain Rating 5/10  -KD     Posttreatment Pain Rating 5/10  -KD     Pain Location - abdomen;flank  -KD     Pain Intervention(s) Nursing Notified  Meds given  -KD       Row Name 09/25/23 1450          Plan of Care Review    Plan of Care Reviewed With patient  -KD     Outcome Evaluation Sup>sit-SBA, Sit>stand-CGA. Fxl mobility to BR-CGA of 1 w/ RW. Toilet t/f-CGA. [facility] hygiene-Ind. Grooming-set up. Pt sat EOB and completed BUE ther ex in all planes w/2lb HW and good tolerance W/ Rb's PRN. Pt then returned to supine-SBA. Pt w/ all needs in reach upon exit. Cont OT POC.  -KD       Row Name 09/25/23 1450          Therapy Assessment/Plan (OT)    Therapy Frequency (OT) other (see comments)  5-7d/wk  -KD       Row Name 09/25/23 1450          Therapy Plan Review/Discharge Plan (OT)    Anticipated Discharge Disposition (OT) other (see comments);skilled nursing facility;inpatient rehabilitation facility;home with assist;home with home health  -KD       Row Name 09/25/23 1450          Vital Signs    Pre Systolic BP Rehab 109  -KD     Pretreatment Heart Rate (beats/min) 72  -KD     Pre SpO2 (%) 94  -KD     O2 Delivery Pre Treatment room air  -KD     Pre Patient Position Supine  -KD     Intra Patient Position Standing  -KD     Post Patient Position Supine  -KD       Row Name 09/25/23 1450          Positioning and Restraints    Pre-Treatment Position in bed  -KD     Post Treatment Position bed  -KD     In Bed fowlers;call light within reach;encouraged to call for assist;exit alarm on;patient within staff view  -KD               User Key  (r) =  Recorded By, (t) = Taken By, (c) = Cosigned By      Initials Name Provider Type    Theodora Ruiz COTA Occupational Therapist Assistant                   Outcome Measures       Row Name 09/25/23 1450          How much help from another is currently needed...    Putting on and taking off regular lower body clothing? 3  -KD     Bathing (including washing, rinsing, and drying) 3  -KD     Toileting (which includes using toilet bed pan or urinal) 3  -KD     Putting on and taking off regular upper body clothing 4  -KD     Taking care of personal grooming (such as brushing teeth) 4  -KD     Eating meals 4  -KD     AM-PAC 6 Clicks Score (OT) 21  -KD       Row Name 09/25/23 0805          How much help from another person do you currently need...    Turning from your back to your side while in flat bed without using bedrails? 4  -KDA     Moving from lying on back to sitting on the side of a flat bed without bedrails? 4  -KDA     Moving to and from a bed to a chair (including a wheelchair)? 3  -KDA     Standing up from a chair using your arms (e.g., wheelchair, bedside chair)? 3  -KDA     Climbing 3-5 steps with a railing? 3  -KDA     To walk in hospital room? 3  -KDA     AM-PAC 6 Clicks Score (PT) 20  -KDA     Highest level of mobility 6 --> Walked 10 steps or more  -KDA               User Key  (r) = Recorded By, (t) = Taken By, (c) = Cosigned By      Initials Name Provider Type    Theodora Ruiz COTA Occupational Therapist Assistant    KDA Marisol Banks, RN Registered Nurse                    Occupational Therapy Education       Title: PT OT SLP Therapies (In Progress)       Topic: Occupational Therapy (In Progress)       Point: ADL training (Done)       Description:   Instruct learner(s) on proper safety adaptation and remediation techniques during self care or transfers.   Instruct in proper use of assistive devices.                  Learning Progress Summary             Patient Acceptance, E,TB, VU by JOSE at  9/20/2023 1527    Comment: OT POC, Role of OT, d/c recommendations                         Point: Home exercise program (Not Started)       Description:   Instruct learner(s) on appropriate technique for monitoring, assisting and/or progressing therapeutic exercises/activities.                  Learner Progress:  Not documented in this visit.              Point: Precautions (Not Started)       Description:   Instruct learner(s) on prescribed precautions during self-care and functional transfers.                  Learner Progress:  Not documented in this visit.              Point: Body mechanics (Not Started)       Description:   Instruct learner(s) on proper positioning and spine alignment during self-care, functional mobility activities and/or exercises.                  Learner Progress:  Not documented in this visit.                              User Key       Initials Effective Dates Name Provider Type Discipline    CM 11/18/22 -  Perlita Rich OT Occupational Therapist OT                  OT Recommendation and Plan  Therapy Frequency (OT): other (see comments) (5-7d/wk)  Plan of Care Review  Plan of Care Reviewed With: patient  Outcome Evaluation: Sup>sit-SBA, Sit>stand-CGA. Fxl mobility to BR-CGA of 1 w/ RW. Toilet t/f-CGA. [facility] hygiene-Ind. Grooming-set up. Pt sat EOB and completed BUE ther ex in all planes w/2lb HW and good tolerance W/ Rb's PRN. Pt then returned to supine-SBA. Pt w/ all needs in reach upon exit. Cont OT POC.     Time Calculation:         Time Calculation- OT       Row Name 09/25/23 1450             Time Calculation- OT    OT Start Time 1450  -KD      OT Stop Time 1515  -KD      OT Time Calculation (min) 25 min  -KD      Total Timed Code Minutes- OT 25 minute(s)  -KD      OT Received On 09/25/23  -KD         Timed Charges    69282 - OT Therapeutic Exercise Minutes 15  -KD      23583 - OT Self Care/Mgmt Minutes 10  -KD         Total Minutes    Timed Charges Total Minutes 25  -KD        Total Minutes 25  -KD                User Key  (r) = Recorded By, (t) = Taken By, (c) = Cosigned By      Initials Name Provider Type    Theodora Ruiz COTA Occupational Therapist Assistant                  Therapy Charges for Today       Code Description Service Date Service Provider Modifiers Qty    90757952960 HC OT THER PROC EA 15 MIN 9/25/2023 Theodora Lewis COTA GO 1    63241387388 HC OT SELF CARE/MGMT/TRAIN EA 15 MIN 9/25/2023 Theodora Lewis COTA GO 1                 LINDSEY Powers  9/25/2023

## 2023-09-25 NOTE — SIGNIFICANT NOTE
09/24/23 1728   Secondary Review   Initial Status Observation   Secondary Review Status Physician advisor review complete   Post Review Status Inpatient  (mg-n myelitis with ataxia and concern for safety. adm on 9/17 req neuro workup for abn MRI concern for osteomyelitis vs meningitis but was neg. 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 high risk 2 hx drug abuse. given 7+day stay i would change to inpt)

## 2023-09-25 NOTE — PLAN OF CARE
Goal Outcome Evaluation:  Plan of Care Reviewed With: patient        Progress: improving  Outcome Evaluation: Pt. was mod (I) for bed mobility and for sitting balance eob.  Pt. was cga for transfers and gait this tx. and amb. 22' x 2 in room with RW and ataxic with L LE and increased time for gait.  Pt. took seated rest break between gait trips and performed a couple LE therex in recliner post tx.  No new goals met and pt. would benefit from continued skilled PT.

## 2023-09-25 NOTE — DISCHARGE PLACEMENT REQUEST
"Requested chart information.  Thanks,   Pool JACOBO RN,   [phone]    GardunoAdelina conradi Ann (57 y.o. Female)       Date of Birth   1965    Social Security Number       Address   [address] [facility] [address]    Home Phone   [phone]    MRN   [id number]       Hindu   Latter day    Marital Status   Single                            Admission Date   9/17/23    Admission Type   Emergency    Admitting Provider   Collin Linares MD    Attending Provider   Edis Valero MD    Department, Room/Bed   [address], 432/1       Discharge Date       Discharge Disposition       Discharge Destination                                 Attending Provider: Edis Valero MD    Allergies: No Known Allergies    Isolation: None   Infection: None   Code Status: CPR    Ht: 154.9 cm (61\")   Wt: 73.2 kg (161 lb 6.4 oz)    Admission Cmt: None   Principal Problem: Lower extremity weakness [R29.898]                   Active Insurance as of 9/17/2023       Primary Coverage       Payor Plan Insurance Group Employer/Plan Group    [facility] MEDICARE REPLACEMENT [facility] MEDICARE REPLACEMENT [id number]       Payor Plan Address Payor Plan Phone Number Payor Plan Fax Number Effective Dates    [address] [address]   1/1/2023 - None Entered    [facility] [id number]         Subscriber Name Subscriber Birth Date Member ID       TOSHIA GARDUNO ANN 1965 [id number]               Secondary Coverage       Payor Plan Insurance Group Employer/Plan Group    [place]        Payor Plan Address Payor Plan Phone Number Payor Plan Fax Number Effective Dates    [address] [phone]  4/7/2020 - None Entered    [facility] [id number]         Subscriber Name Subscriber Birth Date Member ID       TOSHIA GARDUNO ANN 1965 [id number]                     Emergency Contacts        (Rel.) Home Phone Work Phone Mobile Phone    CaseJustina (Sister) [phone] -- -- " "   Jewell Escalante (Relative) [phone] -- [phone]    BRENT ANAND (Daughter) [phone] -- [phone]    \"Best Friend\",Jana (Friend) [phone] -- [phone]    STORMYLAY (Neighbor) [phone] -- --               Pool Shirley RN    Case Management     Significant Note      Signed     Date of Service: 09/25/23 1225  Creation Time: 09/25/23 1225     Signed              09/24/23 1728   Secondary Review   Initial Status Observation   Secondary Review Status Physician advisor review complete   Post Review Status Inpatient  (mg-n myelitis with ataxia and concern for safety. adm on 9/17 req neuro workup for abn MRI concern for osteomyelitis vs meningitis but was neg. 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 high risk 2 hx drug abuse. given 7+day stay i would change to inpt)                          "

## 2023-09-25 NOTE — PLAN OF CARE
Goal Outcome Evaluation:  Plan of Care Reviewed With: patient           Outcome Evaluation: Sup>sit-SBA, Sit>stand-CGA. Fxl mobility to BR-CGA of 1 w/ RW. Toilet t/f-CGA. [facility] hygiene-Ind. Grooming-set up. Pt sat EOB and completed BUE ther ex in all planes w/2lb HW and good tolerance W/ Rb's PRN. Pt then returned to supine-SBA. Pt w/ all needs in reach upon exit. Cont OT POC.      Anticipated Discharge Disposition (OT): other (see comments), skilled nursing facility, inpatient rehabilitation facility, home with assist, home with home health

## 2023-09-26 LAB — COPPER SERPL-MCNC: 100 UG/DL (ref 80–158)

## 2023-09-26 PROCEDURE — 25010000002 METOCLOPRAMIDE PER 10 MG

## 2023-09-26 PROCEDURE — 97110 THERAPEUTIC EXERCISES: CPT

## 2023-09-26 PROCEDURE — 25010000002 ENOXAPARIN PER 10 MG: Performed by: INTERNAL MEDICINE

## 2023-09-26 PROCEDURE — 97530 THERAPEUTIC ACTIVITIES: CPT

## 2023-09-26 PROCEDURE — 97535 SELF CARE MNGMENT TRAINING: CPT

## 2023-09-26 RX ORDER — METOCLOPRAMIDE 10 MG/1
10 TABLET ORAL EVERY 6 HOURS
Status: DISCONTINUED | OUTPATIENT
Start: 2023-09-26 | End: 2023-09-30 | Stop reason: HOSPADM

## 2023-09-26 RX ADMIN — Medication 5 MG: at 01:49

## 2023-09-26 RX ADMIN — METOCLOPRAMIDE HYDROCHLORIDE 10 MG: 5 INJECTION INTRAMUSCULAR; INTRAVENOUS at 09:26

## 2023-09-26 RX ADMIN — METOCLOPRAMIDE 10 MG: 10 TABLET ORAL at 20:37

## 2023-09-26 RX ADMIN — NICOTINE 1 PATCH: 21 PATCH, EXTENDED RELEASE TRANSDERMAL at 20:38

## 2023-09-26 RX ADMIN — LISINOPRIL 10 MG: 10 TABLET ORAL at 21:20

## 2023-09-26 RX ADMIN — HYDROCODONE BITARTRATE AND ACETAMINOPHEN 1 TABLET: 7.5; 325 TABLET ORAL at 20:36

## 2023-09-26 RX ADMIN — METOCLOPRAMIDE 10 MG: 10 TABLET ORAL at 16:17

## 2023-09-26 RX ADMIN — HYDROCODONE BITARTRATE AND ACETAMINOPHEN 1 TABLET: 7.5; 325 TABLET ORAL at 07:20

## 2023-09-26 RX ADMIN — METOCLOPRAMIDE HYDROCHLORIDE 10 MG: 5 INJECTION INTRAMUSCULAR; INTRAVENOUS at 03:33

## 2023-09-26 RX ADMIN — HYDROCODONE BITARTRATE AND ACETAMINOPHEN 1 TABLET: 7.5; 325 TABLET ORAL at 15:03

## 2023-09-26 RX ADMIN — LISINOPRIL 10 MG: 10 TABLET ORAL at 09:26

## 2023-09-26 RX ADMIN — DOCUSATE SODIUM 50 MG AND SENNOSIDES 8.6 MG 2 TABLET: 8.6; 5 TABLET, FILM COATED ORAL at 09:26

## 2023-09-26 RX ADMIN — DULOXETINE HYDROCHLORIDE 20 MG: 20 CAPSULE, DELAYED RELEASE ORAL at 09:26

## 2023-09-26 RX ADMIN — Medication 10 ML: at 09:27

## 2023-09-26 RX ADMIN — HYDROCODONE BITARTRATE AND ACETAMINOPHEN 1 TABLET: 7.5; 325 TABLET ORAL at 01:49

## 2023-09-26 RX ADMIN — BISACODYL 5 MG: 5 TABLET, COATED ORAL at 16:17

## 2023-09-26 NOTE — NURSING NOTE
Pt PIV infiltrated and she requested not to have it replaced I spoke with Dr. Briggs and he stated to leave IV out and change Reglan to PO

## 2023-09-26 NOTE — PLAN OF CARE
Goal Outcome Evaluation:  Plan of Care Reviewed With: patient        Progress: improving  Outcome Evaluation: Pt mod I for all bed mobility, SBA for sit< > stand, CGA/MIn A for t/f EOB < > toilet  with RW 2/2 decreased sensation/control of LLE. Pt peformed B UE ther ex 2 x 20 via yellow theraband. Pt cooperative and pleasant.      Anticipated Discharge Disposition (OT): other (see comments), skilled nursing facility, inpatient rehabilitation facility, home with assist, home with home health

## 2023-09-26 NOTE — THERAPY TREATMENT NOTE
Patient Name: Hanna Escalante  : 1965    MRN: [id number]                              Today's Date: 2023       Physical Therapy Treatment Note    Admit Date: 2023    Visit Dx:     ICD-10-CM ICD-9-CM   1. Weakness of left lower extremity  R29.898 729.89   2. Impaired functional mobility, balance, gait, and endurance [Z74.09 (ICD-10-CM)]  Z74.09 V49.89   3. Impaired mobility and ADLs [Z74.09, Z78.9 (ICD-10-CM)]  Z74.09 V49.89    Z78.9      Patient Active Problem List   Diagnosis    Screening for malignant neoplasm of colon    Neck pain    Tobacco dependence    Insomnia    Back pain    Lower extremity weakness    Depression    Adjustment disorder with depressed mood    Cannabis abuse    Positive urine drug screen for methamphetamine     Past Medical History:   Diagnosis Date    Allergic     Arthritis     COPD (chronic obstructive pulmonary disease)     GERD (gastroesophageal reflux disease)     Hypertension     Infectious viral hepatitis     Low back pain      Past Surgical History:   Procedure Laterality Date    CHOLECYSTECTOMY      COLONOSCOPY N/A 2020    Procedure: COLONOSCOPY;  Surgeon: Joshua Perry MD;  Location: [facility] ENDOSCOPY;  Service: General    HYSTERECTOMY        General Information       Row Name 23 1634          Physical Therapy Time and Intention    Document Type therapy note (daily note)  -     Mode of Treatment individual therapy;physical therapy  -       Row Name 23 1634          General Information    Patient Profile Reviewed yes  -     Existing Precautions/Restrictions fall  -       Row Name 23 1634          Cognition    Orientation Status (Cognition) oriented x 4  -       Row Name 23 1634          Safety Issues, Functional Mobility    Impairments Affecting Function (Mobility) endurance/activity tolerance;coordination;balance;pain  -               User Key  (r) = Recorded By, (t) = Taken By, (c) = Cosigned By      Initials Name  Provider Type     Nellie Arita PTA Physical Therapist Assistant                   Mobility       Row Name 09/26/23 1635          Bed Mobility    Bed Mobility supine-sit;sit-supine  -     Rolling Left [place] (Bed Mobility) modified independence  -     Scooting/Bridging [place] (Bed Mobility) modified independence  -     Supine-Sit [place] (Bed Mobility) modified independence  -     Sit-Supine [place] (Bed Mobility) modified independence  -     Assistive Device (Bed Mobility) head of bed elevated;bed rails  -       Row Name 09/26/23 1635          Bed-Chair Transfer    Bed-Chair [place] (Transfers) contact guard  -     Assistive Device (Bed-Chair Transfers) walker, front-wheeled  -       Row Name 09/26/23 1635          Sit-Stand Transfer    Sit-Stand [place] (Transfers) standby assist  -     Assistive Device (Sit-Stand Transfers) walker, front-wheeled  -       Row Name 09/26/23 1635          Gait/Stairs (Locomotion)    [place] Level (Gait) contact guard  -     Assistive Device (Gait) walker, front-wheeled  -     Distance in Feet (Gait) 6 feet + 68 feet  -     Deviations/Abnormal Patterns (Gait) stride length decreased;weight shifting decreased;left sided deviations;marley decreased  -     Bilateral Gait Deviations forward flexed posture  -               User Key  (r) = Recorded By, (t) = Taken By, (c) = Cosigned By      Initials Name Provider Type     Nellie Arita PTA Physical Therapist Assistant                   Obj/Interventions    No documentation.                  Goals/Plan       Row Name 09/26/23 1658          Bed Mobility Goal 1 (PT)    Activity/Assistive Device (Bed Mobility Goal 1, PT) sit to supine/supine to sit  -     [place] Level/Cues Needed (Bed Mobility Goal 1, PT) independent  -     Time Frame (Bed Mobility Goal 1, PT) by discharge  -     Progress/Outcomes (Bed Mobility Goal 1, PT) goal not met;continuing progress  toward goal  -       Row Name 09/26/23 1658          Transfer Goal 1 (PT)    Activity/Assistive Device (Transfer Goal 1, PT) sit-to-stand/stand-to-sit;bed-to-chair/chair-to-bed;walker, rolling  -     [place] Level/Cues Needed (Transfer Goal 1, PT) modified independence  -     Strategies/Barriers (Transfers Goal 1, PT) Strong LEs, decreased sensation, ataxic gait.  -     Progress/Outcome (Transfer Goal 1, PT) goal not met;continuing progress toward goal  -       Row Name 09/26/23 1658          Gait Training Goal 1 (PT)    Activity/Assistive Device (Gait Training Goal 1, PT) walker, rolling  -     [place] Level (Gait Training Goal 1, PT) modified independence  -     Distance (Gait Training Goal 1, PT) 50'x2.  -     Time Frame (Gait Training Goal 1, PT) by discharge  -     Strategies/Barriers (Gait Training Goal 1, PT) Strong LEs, decreased sensation, ataxic gait.  -     Progress/Outcome (Gait Training Goal 1, PT) goal not met;continuing progress toward goal  -       Row Name 09/26/23 1658          Stairs Goal 1 (PT)    Activity/Assistive Device (Stairs Goal 1, PT) ascending stairs;descending stairs  -     [place] Level/Cues Needed (Stairs Goal 1, PT) modified independence  -     Number of Stairs (Stairs Goal 1, PT) 1 step, may use FWW.  -     Time Frame (Stairs Goal 1, PT) by discharge  -     Strategies/Barriers (Stairs Goal 1, PT) Strong LEs, decreased sensation, ataxic gait.  -     Progress/Outcome (Stairs Goal 1, PT) goal not met  -               User Key  (r) = Recorded By, (t) = Taken By, (c) = Cosigned By      Initials Name Provider Type     Nellie Arita PTA Physical Therapist Assistant                   Clinical Impression       Row Name 09/26/23 1636          Pain    Pretreatment Pain Rating 6/10  -     Posttreatment Pain Rating 6/10  -     Pain Location - Side/Orientation Left  -     Pain Location - other (see comments)  left side  -     Pain  "Intervention(s) Repositioned;Ambulation/increased activity  -       Row Name 09/26/23 1636          Plan of Care Review    Plan of Care Reviewed With patient  -     Progress improving  -     Outcome Evaluation pt is resting in bed at entry. agreeable to PT treatment. she is modified independent with scooting, rolling left, supine to sit and sit to supine in bed. she is SBA for sit to stand with FWW, CGA with fww for bed to chair transfer and CGA for ambulation with FWW. patient has difficulty with placement of L LE when walking and tends to \"hunt\" for where to place it. she needs cues to not take as large of a step on the left and to take a larger step on the right to pass the L LE. cues for heel strike bilaterally. post treatment all needs in reach and met.  -       Row Name 09/26/23 1636          Therapy Assessment/Plan (PT)    Criteria for Skilled Interventions Met (PT) yes;skilled treatment is necessary  -     Therapy Frequency (PT) 5 times/wk  -       Row Name 09/26/23 1636          Vital Signs    Pre Systolic BP Rehab 164  -     Pre Treatment Diastolic BP 75  -     Pretreatment Heart Rate (beats/min) 66  -     Pre SpO2 (%) 98  -     O2 Delivery Pre Treatment room air  -     Pre Patient Position Sitting  -     Post Patient Position Supine  -       Row Name 09/26/23 1636          Positioning and Restraints    Pre-Treatment Position in bed  -     Post Treatment Position bed  -MH     In Bed notified nsg;fowlers;call light within reach;encouraged to call for assist;exit alarm on  -               User Key  (r) = Recorded By, (t) = Taken By, (c) = Cosigned By      Initials Name Provider Type     Nellie Arita PTA Physical Therapist Assistant                   Outcome Measures       Row Name 09/26/23 1658 09/26/23 0855       How much help from another person do you currently need...    Turning from your back to your side while in flat bed without using bedrails? 4  - 4  -    " Moving from lying on back to sitting on the side of a flat bed without bedrails? 4  - 4  -JH    Moving to and from a bed to a chair (including a wheelchair)? 3  - 3  -JH    Standing up from a chair using your arms (e.g., wheelchair, bedside chair)? 4  - 3  -JH    Climbing 3-5 steps with a railing? 3  - 3  -JH    To walk in hospital room? 3  - 3  -JH    AM-PAC 6 Clicks Score (PT) 21  - 20  -JH    Highest level of mobility 6 --> Walked 10 steps or more  - 6 --> Walked 10 steps or more  -              User Key  (r) = Recorded By, (t) = Taken By, (c) = Cosigned By      Initials Name Provider Type     Nellie Arita, PTA Physical Therapist Assistant    Qiana Harrison LPN Licensed Nurse                                 Physical Therapy Education       Title: PT OT SLP Therapies (In Progress)       Topic: Physical Therapy (In Progress)       Point: Mobility training (In Progress)       Learning Progress Summary             Patient Acceptance, E, NR by  at 9/20/2023 1052    Comment: PT POC, hand  placenent with transfers, rehab process.                         Point: Home exercise program (Not Started)       Learner Progress:  Not documented in this visit.              Point: Body mechanics (Not Started)       Learner Progress:  Not documented in this visit.              Point: Precautions (Not Started)       Learner Progress:  Not documented in this visit.                              User Key       Initials Effective Dates Name Provider Type Discipline     07/11/23 -  Raul Leung, PT Physical Therapist PT                  PT Recommendation and Plan     Plan of Care Reviewed With: patient  Progress: improving  Outcome Evaluation: pt is resting in bed at entry. agreeable to PT treatment. she is modified independent with scooting, rolling left, supine to sit and sit to supine in bed. she is SBA for sit to stand with FWW, CGA with fww for bed to chair transfer and CGA for ambulation with FWW.  "patient has difficulty with placement of L LE when walking and tends to \"hunt\" for where to place it. she needs cues to not take as large of a step on the left and to take a larger step on the right to pass the L LE. cues for heel strike bilaterally. post treatment all needs in reach and met.     Time Calculation:         PT Charges       Row Name 09/26/23 1635             Time Calculation    Start Time 1630  -      Stop Time 1655  -      Time Calculation (min) 25 min  -      PT Received On 09/26/23  -         Time Calculation- PT    Total Timed Code Minutes- PT 25 minute(s)  -MH         Timed Charges    78668 - PT Therapeutic Activity Minutes 25  -MH         Total Minutes    Timed Charges Total Minutes 25  -MH       Total Minutes 25  -MH                User Key  (r) = Recorded By, (t) = Taken By, (c) = Cosigned By      Initials Name Provider Type     Nellie Arita PTA Physical Therapist Assistant                  Therapy Charges for Today       Code Description Service Date Service Provider Modifiers Qty    77533407045  PT THERAPEUTIC ACT EA 15 MIN 9/26/2023 Nellie Arita PTA GP 2            PT G-Codes  Outcome Measure Options: AM-PAC 6 Clicks Basic Mobility (PT)  AM-PAC 6 Clicks Score (PT): 21  AM-PAC 6 Clicks Score (OT): 21  PT Discharge Summary  Anticipated Discharge Disposition (PT): home with assist, home with home health, inpatient rehabilitation facility, skilled nursing facility (rehab to home)    Nellie Arita PTA  9/26/2023    "

## 2023-09-26 NOTE — PLAN OF CARE
Goal Outcome Evaluation:  Plan of Care Reviewed With: patient        Progress: improving  Outcome Evaluation: Pt resting in bed, no falls noted, no skin issues, no suicidal ideations, pain controlled with medication and rest

## 2023-09-26 NOTE — THERAPY TREATMENT NOTE
Patient Name: Hanna Escalante  : 1965    MRN: [id number]                              Today's Date: 2023       Admit Date: 2023    Visit Dx:     ICD-10-CM ICD-9-CM   1. Weakness of left lower extremity  R29.898 729.89   2. Impaired functional mobility, balance, gait, and endurance [Z74.09 (ICD-10-CM)]  Z74.09 V49.89   3. Impaired mobility and ADLs [Z74.09, Z78.9 (ICD-10-CM)]  Z74.09 V49.89    Z78.9      Patient Active Problem List   Diagnosis    Screening for malignant neoplasm of colon    Neck pain    Tobacco dependence    Insomnia    Back pain    Lower extremity weakness    Depression    Adjustment disorder with depressed mood    Cannabis abuse    Positive urine drug screen for methamphetamine     Past Medical History:   Diagnosis Date    Allergic     Arthritis     COPD (chronic obstructive pulmonary disease)     GERD (gastroesophageal reflux disease)     Hypertension     Infectious viral hepatitis     Low back pain      Past Surgical History:   Procedure Laterality Date    CHOLECYSTECTOMY      COLONOSCOPY N/A 2020    Procedure: COLONOSCOPY;  Surgeon: Joshua Perry MD;  Location: [facility] ENDOSCOPY;  Service: General    HYSTERECTOMY        General Information       Row Name 23 1052          OT Time and Intention    Document Type therapy note (daily note)  -TO     Mode of Treatment individual therapy;occupational therapy  -TO       Row Name 23 105          General Information    Patient Profile Reviewed yes  -TO     Existing Precautions/Restrictions fall  -TO       Row Name 23 105          Cognition    Orientation Status (Cognition) oriented x 4  -TO       Row Name 23 105          Safety Issues, Functional Mobility    Impairments Affecting Function (Mobility) endurance/activity tolerance;coordination;balance;pain  -TO               User Key  (r) = Recorded By, (t) = Taken By, (c) = Cosigned By      Initials Name Provider Type    TO Issa Ventura COTA  Occupational Therapist Assistant                     Mobility/ADL's       Row Name 09/26/23 1052          Bed Mobility    Bed Mobility supine-sit;sit-supine  -TO     All Activities, [place] (Bed Mobility) contact guard  -TO     Rolling Left [place] (Bed Mobility) modified independence  -TO     Rolling Right [place] (Bed Mobility) modified independence  -TO     Scooting/Bridging [place] (Bed Mobility) modified independence  -TO     Supine-Sit [place] (Bed Mobility) modified independence  -TO     Sit-Supine [place] (Bed Mobility) modified independence  -TO     Assistive Device (Bed Mobility) head of bed elevated;bed rails  -TO       Row Name 09/26/23 1052          Transfers    Transfers sit-stand transfer;stand-sit transfer;toilet transfer  -TO       Row Name 09/26/23 1052          Sit-Stand Transfer    Sit-Stand [place] (Transfers) standby assist  -TO     Assistive Device (Sit-Stand Transfers) walker, front-wheeled  -TO       Row Name 09/26/23 1052          Stand-Sit Transfer    Stand-Sit [place] (Transfers) standby assist  -TO     Assistive Device (Stand-Sit Transfers) walker, front-wheeled  -TO       Row Name 09/26/23 1052          Toilet Transfer    Type (Toilet Transfer) sit-stand;stand-sit  -TO     [place] Level (Toilet Transfer) contact guard  -TO     Assistive Device (Toilet Transfer) walker, front-wheeled  -TO       Row Name 09/26/23 1052          Functional Mobility    Functional Mobility- Ind. Level contact guard assist  -TO     Functional Mobility- Device walker, front-wheeled  -TO     Functional Mobility-Distance (Feet) 10  -TO     Functional Mobility- Comment ataxia with LLE  -TO       Row Name 09/26/23 1052          Activities of Daily Living    BADL Assessment/Intervention toileting  -TO       Row Name 09/26/23 1052          Lower Body Dressing Assessment/Training    [place] Level (Lower Body Dressing) lower body dressing skills;doff;don;socks;set up   -TO     Position (Lower Body Dressing) edge of bed sitting  -TO       [facility] Name 09/26/23 1052          Toileting Assessment/Training    [place] Level (Toileting) toileting skills;adjust/manage clothing;perform perineal hygiene;contact guard assist  -TO     Position (Toileting) supported standing  -TO               User Key  (r) = Recorded By, (t) = Taken By, (c) = Cosigned By      Initials Name Provider Type    TO Issa Ventura COTA Occupational Therapist Assistant                   Obj/Interventions       Row Name 09/26/23 1052          Range of Motion Comprehensive    General Range of Motion bilateral lower extremity ROM WFL  -TO       Row Name 09/26/23 1052          Shoulder (Therapeutic Exercise)    Shoulder (Therapeutic Exercise) strengthening exercise  -TO     Shoulder Strengthening (Therapeutic Exercise) bilateral;aBduction;aDduction;horizontal aBduction/aDduction;resisted diagonal exercise;scapular stabilization;yellow;20 repititions;sitting;2 sets  -TO       Row Name 09/26/23 1052          Elbow/Forearm (Therapeutic Exercise)    Elbow/Forearm (Therapeutic Exercise) strengthening exercise  -TO     Elbow/Forearm AROM (Therapeutic Exercise) bilateral;flexion;extension;2 sets;20 repititions  -TO               User Key  (r) = Recorded By, (t) = Taken By, (c) = Cosigned By      Initials Name Provider Type    TO Issa Ventura COTA Occupational Therapist Assistant                   Goals/Plan       Row Name 09/26/23 1052          Transfer Goal 1 (OT)    Activity/Assistive Device (Transfer Goal 1, OT) toilet  -TO     [place] Level/Cues Needed (Transfer Goal 1, OT) supervision required  -TO     Time Frame (Transfer Goal 1, OT) long term goal (LTG);by discharge  -TO     Progress/Outcome (Transfer Goal 1, OT) goal not met  -TO       Row Name 09/26/23 1052          Bathing Goal 1 (OT)    Activity/Device (Bathing Goal 1, OT) lower body bathing  -TO     [place] Level/Cues Needed (Bathing Goal 1, OT)  supervision required  -TO     Time Frame (Bathing Goal 1, OT) long term goal (LTG);by discharge  -TO     Progress/Outcomes (Bathing Goal 1, OT) goal not met  -TO       Row Name 09/26/23 1052          Dressing Goal 1 (OT)    Activity/Device (Dressing Goal 1, OT) lower body dressing  -TO     [place]/Cues Needed (Dressing Goal 1, OT) supervision required  -TO     Time Frame (Dressing Goal 1, OT) long term goal (LTG);by discharge  -TO     Progress/Outcome (Dressing Goal 1, OT) goal not met  -TO       Row Name 09/26/23 1052          Problem Specific Goal 1 (OT)    Problem Specific Goal 1 (OT) Pt will tolerate at least 4 continuous minutes of standing ADL with SPV or less and zero LOB for increased balance required for ADLs and mobility.  -TO     Time Frame (Problem Specific Goal 1, OT) long term goal (LTG);by discharge  -TO     Progress/Outcome (Problem Specific Goal 1, OT) goal not met  -TO               User Key  (r) = Recorded By, (t) = Taken By, (c) = Cosigned By      Initials Name Provider Type    TO Issa Ventura COTA Occupational Therapist Assistant                   Clinical Impression       Row Name 09/26/23 1032          Pain Assessment    Pretreatment Pain Rating 6/10  -TO     Posttreatment Pain Rating 6/10  -TO       Row Name 09/26/23 1032          Plan of Care Review    Plan of Care Reviewed With patient  -TO     Progress improving  -TO     Outcome Evaluation Pt mod I for all bed mobility, SBA for sit< > stand, CGA/MIn A for t/f EOB < > toilet  with RW 2/2 decreased sensation/control of LLE. Pt peformed B UE ther ex 2 x 20 via yellow theraband. Pt cooperative and pleasant.  -TO       Row Name 09/26/23 1032          Therapy Assessment/Plan (OT)    Rehab Potential (OT) good, to achieve stated therapy goals  -TO     Criteria for Skilled Therapeutic Interventions Met (OT) yes;meets criteria  -TO     Therapy Frequency (OT) other (see comments)  5-7d/wk  -TO       Row Name 09/26/23 1032          Therapy  Plan Review/Discharge Plan (OT)    Anticipated Discharge Disposition (OT) other (see comments);skilled nursing facility;inpatient rehabilitation facility;home with assist;home with home health  -TO       Row Name 09/26/23 1032          Positioning and Restraints    In Bed call light within reach;encouraged to call for assist;exit alarm on;supine  -TO               User Key  (r) = Recorded By, (t) = Taken By, (c) = Cosigned By      Initials Name Provider Type    TO Issa Ventura COTA Occupational Therapist Assistant                   Outcome Measures       Row Name 09/26/23 1052          How much help from another is currently needed...    Putting on and taking off regular lower body clothing? 3  -TO     Bathing (including washing, rinsing, and drying) 3  -TO     Toileting (which includes using toilet bed pan or urinal) 3  -TO     Putting on and taking off regular upper body clothing 4  -TO     Taking care of personal grooming (such as brushing teeth) 4  -TO     Eating meals 4  -TO     AM-PAC 6 Clicks Score (OT) 21  -TO       Row Name 09/26/23 1658 09/26/23 0855       How much help from another person do you currently need...    Turning from your back to your side while in flat bed without using bedrails? 4  - 4  -JH    Moving from lying on back to sitting on the side of a flat bed without bedrails? 4  - 4  -JH    Moving to and from a bed to a chair (including a wheelchair)? 3  - 3  -JH    Standing up from a chair using your arms (e.g., wheelchair, bedside chair)? 4  - 3  -JH    Climbing 3-5 steps with a railing? 3  - 3  -JH    To walk in hospital room? 3  - 3  -JH    AM-PAC 6 Clicks Score (PT) 21  - 20  -JH    Highest level of mobility 6 --> Walked 10 steps or more  - 6 --> Walked 10 steps or more  -JH              User Key  (r) = Recorded By, (t) = Taken By, (c) = Cosigned By      Initials Name Provider Type    Nellie Boykin, PTA Physical Therapist Assistant    TO Issa Ventura COTA  Occupational Therapist Assistant    Qiana Harrison LPN Licensed Nurse                    Occupational Therapy Education       Title: PT OT SLP Therapies (In Progress)       Topic: Occupational Therapy (In Progress)       Point: ADL training (Done)       Description:   Instruct learner(s) on proper safety adaptation and remediation techniques during self care or transfers.   Instruct in proper use of assistive devices.                  Learning Progress Summary             Patient Acceptance, E,TB, VU by CM at 9/20/2023 3357    Comment: OT POC, Role of OT, d/c recommendations                         Point: Home exercise program (Not Started)       Description:   Instruct learner(s) on appropriate technique for monitoring, assisting and/or progressing therapeutic exercises/activities.                  Learner Progress:  Not documented in this visit.              Point: Precautions (Not Started)       Description:   Instruct learner(s) on prescribed precautions during self-care and functional transfers.                  Learner Progress:  Not documented in this visit.              Point: Body mechanics (Not Started)       Description:   Instruct learner(s) on proper positioning and spine alignment during self-care, functional mobility activities and/or exercises.                  Learner Progress:  Not documented in this visit.                              User Key       Initials Effective Dates Name Provider Type Discipline    JOSE 11/18/22 -  Perlita Rich OT Occupational Therapist OT                  OT Recommendation and Plan  Therapy Frequency (OT): other (see comments) (5-7d/wk)  Plan of Care Review  Plan of Care Reviewed With: patient  Progress: improving  Outcome Evaluation: Pt mod I for all bed mobility, SBA for sit< > stand, CGA/MIn A for t/f EOB < > toilet  with RW 2/2 decreased sensation/control of LLE. Pt peformed B UE ther ex 2 x 20 via yellow theraband. Pt cooperative and pleasant.     Time  Calculation:         Time Calculation- OT       Row Name 09/26/23 1710             Time Calculation- OT    OT Start Time 1052  -TO      OT Stop Time 1122  -TO      OT Time Calculation (min) 30 min  -TO      Total Timed Code Minutes- OT 30 minute(s)  -TO      OT Received On 09/26/23  -TO         Timed Charges    37409 - OT Therapeutic Exercise Minutes 15  -TO      28330 - OT Self Care/Mgmt Minutes 15  -TO         Total Minutes    Timed Charges Total Minutes 30  -TO       Total Minutes 30  -TO                User Key  (r) = Recorded By, (t) = Taken By, (c) = Cosigned By      Initials Name Provider Type    TO Issa Ventura COTA Occupational Therapist Assistant                  Therapy Charges for Today       Code Description Service Date Service Provider Modifiers Qty    52691341701 HC OT THER PROC EA 15 MIN 9/26/2023 Issa Ventura COTA GO 1    42162611841 HC OT SELF CARE/MGMT/TRAIN EA 15 MIN 9/26/2023 Issa Ventura COTA GO 1                 LINDSEY Bragg  9/26/2023

## 2023-09-26 NOTE — PROGRESS NOTES
[facility] Medicine Services  INPATIENT PROGRESS NOTE    Length of Stay: 2  Date of Admission: 9/17/2023  Primary Care Physician: Ninfa Bhatti APRN    Subjective   Chief Complaint: Left lower extremity weakness  HPI: Patient states she is better.  States she is working well with therapy.    As of today, 09/26/23  Review of Systems   Constitutional:  Negative for appetite change, chills, fatigue, fever and unexpected weight change.   Respiratory:  Negative for cough, choking, chest tightness, shortness of breath and wheezing.    Cardiovascular:  Negative for chest pain, palpitations and leg swelling.   Gastrointestinal:  Negative for abdominal pain, blood in stool, constipation, diarrhea, nausea and vomiting.   Genitourinary:  Negative for dysuria, flank pain and hematuria.   Neurological:  Negative for dizziness, seizures, syncope, speech difficulty, weakness, light-headedness, numbness and headaches.   Hematological:  Does not bruise/bleed easily.      All pertinent negatives and positives are as above. All other systems have been reviewed and are negative unless otherwise stated.    Objective    Temp:  [96.6 °F (35.9 °C)-98.6 °F (37 °C)] 96.6 °F (35.9 °C)  Heart Rate:  [61-75] 75  Resp:  [18] 18  BP: (109-118)/(57-64) 109/57    AM-PAC 6 Clicks Score (PT): 21 (09/26/23 1658)    As of today, 09/26/23  Physical Exam  Vitals reviewed.   Constitutional:       Appearance: She is well-developed.   HENT:      Head: Normocephalic and atraumatic.   Eyes:      Pupils: Pupils are equal, round, and reactive to light.   Cardiovascular:      Rate and Rhythm: Normal rate and regular rhythm.      Heart sounds: Normal heart sounds. No murmur heard.    No friction rub. No gallop.   Pulmonary:      Effort: Pulmonary effort is normal. No respiratory distress.      Breath sounds: Normal breath sounds. No wheezing or rales.   Chest:      Chest wall: No tenderness.   Abdominal:       General: Bowel sounds are normal. There is no distension.      Palpations: Abdomen is soft.      Tenderness: There is no abdominal tenderness. There is no guarding.   Musculoskeletal:      Cervical back: Normal range of motion and neck supple.   Skin:     General: Skin is warm and dry.   Psychiatric:         Behavior: Behavior normal.         Thought Content: Thought content normal.         Results Review:  I have reviewed the labs, radiology results, and diagnostic studies.    Laboratory Data:   Results from last 7 days   Lab Units 09/21/23  0620 09/20/23  0511   SODIUM mmol/L 137 137   POTASSIUM mmol/L 4.5 4.1   CHLORIDE mmol/L 101 101   CO2 mmol/L 28.0 28.0   BUN mg/dL 25* 16   CREATININE mg/dL 0.98 0.92   GLUCOSE mg/dL 101* 101*   CALCIUM mg/dL 9.3 8.9   ANION GAP mmol/L 8.0 8.0       Estimated Creatinine Clearance: 55.7 mL/min (by C-G formula based on SCr of 0.98 mg/dL).            Results from last 7 days   Lab Units 09/21/23  0620 09/20/23  0511   WBC 10*3/mm3 6.02 6.43   HEMOGLOBIN g/dL 14.6 14.2   HEMATOCRIT % 43.8 42.4   PLATELETS 10*3/mm3 201 212             Culture Data:   No results found for: BLOODCX  No results found for: URINECX  No results found for: RESPCX  No results found for: WOUNDCX  No results found for: STOOLCX  No components found for: BODYFLD    Radiology Data:   Imaging Results (Last 24 Hours)       ** No results found for the last 24 hours. **            I have utilized all available immediate resources to obtain, update, or review the patient's current medications (including all prescriptions, over-the-counter products, herbals, cannabis/cannabidiol products, and vitamin/mineral/dietary (nutritional) supplements).       Assessment/Plan     Active Hospital Problems    Diagnosis     **Lower extremity weakness     Depression     Adjustment disorder with depressed mood     Cannabis abuse     Positive urine drug screen for methamphetamine        Plan:  1.  Left lower extremity weakness:  Work-up negative so far.  Continue therapy.  Placement pending.  2.  Suicidal ideation: Resolved.  Cleared by psych.  3.  History of polysubstance abuse: Counseled.  4.  Hypertension: Continue current treatment.  5.  DVT prophylaxis: Lovenox.      Medical Decision Making  Number and Complexity of problems: 2 highly complex and two moderately complex medical problems    Conditions and Status:        Condition is improving.     Discussed with: Patient and nursing     Treatment Plan  As above    Care Planning  Shared decision making: Patient in agreement plan of care  Code status and discussions: Full code    Disposition  Social Determinants of Health that impact treatment or disposition: None  I expect the patient to be discharged to rehab when accepted.      The patient was evaluated during the global COVID-19 pandemic, and the diagnosis was suspected/considered upon their initial presentation.  Evaluation, treatment, and testing were consistent with current guidelines for patients who present with complaints or symptoms that may be related to COVID-19.    I confirmed that the patient's Advance Care Plan is present, code status is documented, or surrogate decision maker is listed in the patient's medical record.        This document has been electronically signed by Amrit Briggs MD on September 26, 2023 18:34 CDT

## 2023-09-26 NOTE — PLAN OF CARE
"Goal Outcome Evaluation:  Plan of Care Reviewed With: patient        Progress: improving  Outcome Evaluation: pt is resting in bed at entry. agreeable to PT treatment. she is modified independent with scooting, rolling left, supine to sit and sit to supine in bed. she is SBA for sit to stand with FWW, CGA with fww for bed to chair transfer and CGA for ambulation with FWW. patient has difficulty with placement of L LE when walking and tends to \"hunt\" for where to place it. she needs cues to not take as large of a step on the left and to take a larger step on the right to pass the L LE. cues for heel strike bilaterally. post treatment all needs in reach and met.      Anticipated Discharge Disposition (PT): home with assist, home with home health, inpatient rehabilitation facility, skilled nursing facility (rehab to home)  "

## 2023-09-26 NOTE — PLAN OF CARE
Goal Outcome Evaluation:  Plan of Care Reviewed With: patient        Progress: improving  Outcome Evaluation: VSS. Patient still c/o pain with po pain medication. No acute changes. Ambulatory to bathroom with walker and SBA.

## 2023-09-27 LAB
QT INTERVAL: 386 MS
QT INTERVAL: 444 MS
QTC INTERVAL: 446 MS
QTC INTERVAL: 448 MS

## 2023-09-27 PROCEDURE — 97535 SELF CARE MNGMENT TRAINING: CPT

## 2023-09-27 PROCEDURE — 25010000002 ENOXAPARIN PER 10 MG: Performed by: INTERNAL MEDICINE

## 2023-09-27 PROCEDURE — 97530 THERAPEUTIC ACTIVITIES: CPT

## 2023-09-27 PROCEDURE — 97116 GAIT TRAINING THERAPY: CPT

## 2023-09-27 RX ADMIN — HYDROCODONE BITARTRATE AND ACETAMINOPHEN 1 TABLET: 7.5; 325 TABLET ORAL at 16:53

## 2023-09-27 RX ADMIN — DULOXETINE HYDROCHLORIDE 20 MG: 20 CAPSULE, DELAYED RELEASE ORAL at 09:30

## 2023-09-27 RX ADMIN — HYDROCODONE BITARTRATE AND ACETAMINOPHEN 1 TABLET: 7.5; 325 TABLET ORAL at 04:30

## 2023-09-27 RX ADMIN — LISINOPRIL 10 MG: 10 TABLET ORAL at 21:28

## 2023-09-27 RX ADMIN — METOCLOPRAMIDE 10 MG: 10 TABLET ORAL at 04:30

## 2023-09-27 RX ADMIN — Medication 5 MG: at 21:28

## 2023-09-27 RX ADMIN — Medication 5 MG: at 00:27

## 2023-09-27 RX ADMIN — METOCLOPRAMIDE 10 MG: 10 TABLET ORAL at 21:28

## 2023-09-27 RX ADMIN — NICOTINE 1 PATCH: 21 PATCH, EXTENDED RELEASE TRANSDERMAL at 21:27

## 2023-09-27 RX ADMIN — HYDROCODONE BITARTRATE AND ACETAMINOPHEN 1 TABLET: 7.5; 325 TABLET ORAL at 09:28

## 2023-09-27 RX ADMIN — METOCLOPRAMIDE 10 MG: 10 TABLET ORAL at 16:53

## 2023-09-27 RX ADMIN — METOCLOPRAMIDE 10 MG: 10 TABLET ORAL at 09:30

## 2023-09-27 RX ADMIN — DOCUSATE SODIUM 50 MG AND SENNOSIDES 8.6 MG 2 TABLET: 8.6; 5 TABLET, FILM COATED ORAL at 21:28

## 2023-09-27 RX ADMIN — DOCUSATE SODIUM 50 MG AND SENNOSIDES 8.6 MG 2 TABLET: 8.6; 5 TABLET, FILM COATED ORAL at 09:29

## 2023-09-27 RX ADMIN — ENOXAPARIN SODIUM 40 MG: 40 INJECTION SUBCUTANEOUS at 09:30

## 2023-09-27 RX ADMIN — LISINOPRIL 10 MG: 10 TABLET ORAL at 09:30

## 2023-09-27 RX ADMIN — HYDROCODONE BITARTRATE AND ACETAMINOPHEN 1 TABLET: 7.5; 325 TABLET ORAL at 00:27

## 2023-09-27 RX ADMIN — HYDROCODONE BITARTRATE AND ACETAMINOPHEN 1 TABLET: 7.5; 325 TABLET ORAL at 21:29

## 2023-09-27 NOTE — THERAPY TREATMENT NOTE
Acute Care - Physical Therapy Treatment Note  [facility]     Patient Name: Hanna Escalante  : 1965  MRN: [id number]  Today's Date: 2023      Visit Dx:     ICD-10-CM ICD-9-CM   1. Weakness of left lower extremity  R29.898 729.89   2. Impaired functional mobility, balance, gait, and endurance [Z74.09 (ICD-10-CM)]  Z74.09 V49.89   3. Impaired mobility and ADLs [Z74.09, Z78.9 (ICD-10-CM)]  Z74.09 V49.89    Z78.9      Patient Active Problem List   Diagnosis    Screening for malignant neoplasm of colon    Neck pain    Tobacco dependence    Insomnia    Back pain    Lower extremity weakness    Depression    Adjustment disorder with depressed mood    Cannabis abuse    Positive urine drug screen for methamphetamine     Past Medical History:   Diagnosis Date    Allergic     Arthritis     COPD (chronic obstructive pulmonary disease)     GERD (gastroesophageal reflux disease)     Hypertension     Infectious viral hepatitis     Low back pain      Past Surgical History:   Procedure Laterality Date    CHOLECYSTECTOMY      COLONOSCOPY N/A 2020    Procedure: COLONOSCOPY;  Surgeon: Joshua Perry MD;  Location: [facility] ENDOSCOPY;  Service: General    HYSTERECTOMY       PT Assessment (last 12 hours)       PT Evaluation and Treatment       Row Name 23 1329          Physical Therapy Time and Intention    Subjective Information no complaints  -CA     Document Type therapy note (daily note)  -CA     Mode of Treatment individual therapy;physical therapy  -CA       Row Name 23 1329          General Information    Existing Precautions/Restrictions fall  -CA       Row Name 23 1329          Pain    Pretreatment Pain Rating 0/10 - no pain  -CA     Posttreatment Pain Rating 0/10 - no pain  -CA       Row Name 23 1329          Cognition    Orientation Status (Cognition) oriented x 4  -CA       Row Name 23 1329          Bed Mobility    Bed Mobility rolling  left;scooting/bridging;supine-sit;sit-supine  -CA     Rolling Left [place] (Bed Mobility) modified independence  -CA     Scooting/Bridging [place] (Bed Mobility) modified independence  -CA     Supine-Sit [place] (Bed Mobility) modified independence  -CA     Sit-Supine [place] (Bed Mobility) modified independence  -CA       Row Name 09/27/23 1329          Transfers    Transfers sit-stand transfer;stand-sit transfer  -CA       Row Name 09/27/23 1329          Bed-Chair Transfer    Bed-Chair [place] (Transfers) contact guard  -CA     Assistive Device (Bed-Chair Transfers) walker, front-wheeled  -CA       Row Name 09/27/23 1329          Sit-Stand Transfer    Sit-Stand [place] (Transfers) standby assist  -CA     Assistive Device (Sit-Stand Transfers) walker, front-wheeled  -CA       Row Name 09/27/23 1329          Stand-Sit Transfer    Stand-Sit [place] (Transfers) standby assist  -CA     Assistive Device (Stand-Sit Transfers) walker, front-wheeled  -CA       Row Name 09/27/23 1329          Toilet Transfer    Type (Toilet Transfer) sit-stand;stand-sit  -CA     [place] Level (Toilet Transfer) standby assist;contact guard  -CA     Assistive Device (Toilet Transfer) walker, front-wheeled  -CA       Row Name 09/27/23 1329          Gait/Stairs (Locomotion)    [place] Level (Gait) contact guard  -CA     Assistive Device (Gait) walker, front-wheeled  -CA     Distance in Feet (Gait) 24' x 2 and 8  -CA     Pattern (Gait) step-to  -CA     Deviations/Abnormal Patterns (Gait) stride length decreased  -CA     Bilateral Gait Deviations forward flexed posture  -CA     Comment, (Gait/Stairs) L LE ataxic gait  -CA       Row Name 09/27/23 1329          Vital Signs    Pre Patient Position Supine  -CA     Intra Patient Position Standing  -CA     Post Patient Position Supine  -CA       Row Name 09/27/23 1329          Positioning and Restraints    Pre-Treatment Position in bed  -CA     Post  Treatment Position bed  -CA     In Bed fowlers;call light within reach;encouraged to call for assist  -CA               User Key  (r) = Recorded By, (t) = Taken By, (c) = Cosigned By      Initials Name Provider Type    CA Dany Whitaker, PTA Physical Therapist Assistant                    Physical Therapy Education       Title: PT OT SLP Therapies (In Progress)       Topic: Physical Therapy (In Progress)       Point: Mobility training (In Progress)       Learning Progress Summary             Patient Acceptance, E, NR by  at 9/20/2023 1052    Comment: PT POC, hand  placenent with transfers, rehab process.                         Point: Home exercise program (Not Started)       Learner Progress:  Not documented in this visit.              Point: Body mechanics (Not Started)       Learner Progress:  Not documented in this visit.              Point: Precautions (Not Started)       Learner Progress:  Not documented in this visit.                              User Key       Initials Effective Dates Name Provider Type Discipline     07/11/23 -  Raul Leung, PT Physical Therapist PT                  PT Recommendation and Plan     Plan of Care Reviewed With: patient  Progress: improving  Outcome Evaluation: Pt. was Mod (I) for bed mobility and sba for sit to stand transfers and performed several during tx.  Pt. was cga for gait and amb. 8' and then 24' x 2 with seated rest break between gait trips and L LE ataxic gait.  No new goals met and pt. mobility improving and would benefit from cotinued skilled PT.       Time Calculation:    PT Charges       Row Name 09/27/23 1359             Time Calculation    Start Time 1329  -CA      Stop Time 1353  -CA      Time Calculation (min) 24 min  -CA      PT Received On 09/27/23  -CA         Time Calculation- PT    Total Timed Code Minutes- PT 24 minute(s)  -CA                User Key  (r) = Recorded By, (t) = Taken By, (c) = Cosigned By      Initials Name Provider Type    CA  Dany Whitaker PTA Physical Therapist Assistant                  Therapy Charges for Today       Code Description Service Date Service Provider Modifiers Qty    42821178268 HC GAIT TRAINING EA 15 MIN 9/27/2023 Dany Whitaker, NEVIN GP 1    51344826039 HC PT THERAPEUTIC ACT EA 15 MIN 9/27/2023 Dany Whitaker PTA GP 1            PT G-Codes  Outcome Measure Options: AM-PAC 6 Clicks Basic Mobility (PT)  AM-PAC 6 Clicks Score (PT): 21  AM-PAC 6 Clicks Score (OT): 24    Dany Whitaker PTA  9/27/2023

## 2023-09-27 NOTE — PLAN OF CARE
"Goal Outcome Evaluation:  Plan of Care Reviewed With: patient        Progress: improving  Outcome Evaluation: VSS. Patient reports she had good therapy sessions today but is \"tired.\" No acute changes this shift. Medicated for pain x 2 this shift. Melatonin given for insomnia.         "

## 2023-09-27 NOTE — THERAPY TREATMENT NOTE
Patient Name: Hanna Escalante  : 1965    MRN: [id number]                              Today's Date: 2023       Admit Date: 2023    Visit Dx:     ICD-10-CM ICD-9-CM   1. Weakness of left lower extremity  R29.898 729.89   2. Impaired functional mobility, balance, gait, and endurance [Z74.09 (ICD-10-CM)]  Z74.09 V49.89   3. Impaired mobility and ADLs [Z74.09, Z78.9 (ICD-10-CM)]  Z74.09 V49.89    Z78.9      Patient Active Problem List   Diagnosis    Screening for malignant neoplasm of colon    Neck pain    Tobacco dependence    Insomnia    Back pain    Lower extremity weakness    Depression    Adjustment disorder with depressed mood    Cannabis abuse    Positive urine drug screen for methamphetamine     Past Medical History:   Diagnosis Date    Allergic     Arthritis     COPD (chronic obstructive pulmonary disease)     GERD (gastroesophageal reflux disease)     Hypertension     Infectious viral hepatitis     Low back pain      Past Surgical History:   Procedure Laterality Date    CHOLECYSTECTOMY      COLONOSCOPY N/A 2020    Procedure: COLONOSCOPY;  Surgeon: Joshua Perry MD;  Location: [facility] ENDOSCOPY;  Service: General    HYSTERECTOMY        General Information       Row Name 2340          OT Time and Intention    Document Type therapy note (daily note)  -KD     Mode of Treatment individual therapy;occupational therapy  -KD       Row Name 23          General Information    Patient Profile Reviewed yes  -KD     Existing Precautions/Restrictions fall  -KD       Row Name 23          Cognition    Orientation Status (Cognition) oriented x 4  -KD       Row Name 23          Safety Issues, Functional Mobility    Impairments Affecting Function (Mobility) endurance/activity tolerance;coordination;balance;pain  -KD               User Key  (r) = Recorded By, (t) = Taken By, (c) = Cosigned By      Initials Name Provider Type    KD Theodora Lewis COTA  Occupational Therapist Assistant                     Mobility/ADL's       Row Name 09/27/23 0940          Bed Mobility    Supine-Sit Clinch (Bed Mobility) modified independence  -KD     Sit-Supine Clinch (Bed Mobility) modified independence  -KD     Assistive Device (Bed Mobility) head of bed elevated;bed rails  -       Row Name 09/27/23 0940          Sit-Stand Transfer    Sit-Stand Clinch (Transfers) standby assist  -KD     Assistive Device (Sit-Stand Transfers) walker, front-wheeled  -KD       Row Name 09/27/23 0940          Stand-Sit Transfer    Stand-Sit Clinch (Transfers) standby assist  -KD     Assistive Device (Stand-Sit Transfers) walker, front-wheeled  -KD       Row Name 09/27/23 0940          Toilet Transfer    [place] Level (Toilet Transfer) contact guard  -     Assistive Device (Toilet Transfer) walker, front-wheeled  -       Row Name 09/27/23 0940          Functional Mobility    Functional Mobility- Ind. Level contact guard assist  -KD     Functional Mobility- Device walker, front-wheeled  -     Functional Mobility-Distance (Feet) 4  x2  -       Row Name 09/27/23 0940          Activities of Daily Living    BADL Assessment/Intervention bathing;upper body dressing;lower body dressing;grooming;toileting  -       Row Name 09/27/23 0940          Lower Body Dressing Assessment/Training    [place] Level (Lower Body Dressing) lower body dressing skills;doff;don;socks;supervision  -     Position (Lower Body Dressing) edge of bed sitting  -       Row Name 09/27/23 0940          Toileting Assessment/Training    [place] Level (Toileting) toileting skills;adjust/manage clothing;perform perineal hygiene;standby assist;set up  -     Position (Toileting) unsupported sitting  -       Row Name 09/27/23 0940          Bathing Assessment/Intervention    [place] Level (Bathing) bathing skills;lower body;upper body;standby assist  -     Assistive Devices  (Bathing) bath mitt;grab bar, tub/shower;long-handled sponge;shower chair  -KD     Position (Bathing) unsupported sitting  -KD       Row Name 09/27/23 0940          Upper Body Dressing Assessment/Training    [place] Level (Upper Body Dressing) upper body dressing skills;doff;don;standby assist  -KD     Position (Upper Body Dressing) edge of bed sitting  -KD       Row Name 09/27/23 0940          Grooming Assessment/Training    [place] Level (Grooming) grooming skills;hair care, combing/brushing;oral care regimen;wash face, hands;set up  -KD     Position (Grooming) edge of bed sitting  -KD               User Key  (r) = Recorded By, (t) = Taken By, (c) = Cosigned By      Initials Name Provider Type    Theodora Ruiz COTA Occupational Therapist Assistant                   Obj/Interventions    No documentation.                  Goals/Plan       Row Name 09/27/23 0940          Transfer Goal 1 (OT)    Activity/Assistive Device (Transfer Goal 1, OT) toilet  -KD     [place] Level/Cues Needed (Transfer Goal 1, OT) supervision required  -KD     Time Frame (Transfer Goal 1, OT) long term goal (LTG);by discharge  -KD     Progress/Outcome (Transfer Goal 1, OT) goal not met  -KD       Row Name 09/27/23 0940          Bathing Goal 1 (OT)    Activity/Device (Bathing Goal 1, OT) lower body bathing  -KD     [place] Level/Cues Needed (Bathing Goal 1, OT) supervision required  -KD     Time Frame (Bathing Goal 1, OT) long term goal (LTG);by discharge  -KD     Progress/Outcomes (Bathing Goal 1, OT) goal met  -KD       Row Name 09/27/23 0940          Dressing Goal 1 (OT)    Activity/Device (Dressing Goal 1, OT) lower body dressing  -KD     [place]/Cues Needed (Dressing Goal 1, OT) supervision required  -KD     Time Frame (Dressing Goal 1, OT) long term goal (LTG);by discharge  -KD     Progress/Outcome (Dressing Goal 1, OT) goal met  -KD       [facility] Name 09/27/23 0940          Problem Specific Goal 1 (OT)     Problem Specific Goal 1 (OT) Pt will tolerate at least 4 continuous minutes of standing ADL with SPV or less and zero LOB for increased balance required for ADLs and mobility.  -KD     Time Frame (Problem Specific Goal 1, OT) long term goal (LTG);by discharge  -KD     Progress/Outcome (Problem Specific Goal 1, OT) goal not met  -KD               User Key  (r) = Recorded By, (t) = Taken By, (c) = Cosigned By      Initials Name Provider Type     Theodora Lewis COTA Occupational Therapist Assistant                   Clinical Impression       Row Name 09/27/23 0940          Pain Assessment    Pretreatment Pain Rating 0/10 - no pain  -KD     Posttreatment Pain Rating 0/10 - no pain  -KD       Row Name 09/27/23 0940          Plan of Care Review    Plan of Care Reviewed With patient  -KD     Progress improving  -KD     Outcome Evaluation OT tx complete this date. Sup>sit-Mod I. Sit>stand-SBA. Fxl mobility to BR-CGA. Toilet t/f-SBA. [facility]-Ind. Shower t/f-SBA. Pt sat on shower chair andcompleted shower. UB/LB bathing-SBA w/ LH sponge. UB/LB dressing-SBA. Grooming (oral care) comb hair-set up. Pt then returned to sup-Mod I. Pt w/ all needs in reach upon exit. Cont OT POC.  -KD       Row Name 09/27/23 0940          Therapy Assessment/Plan (OT)    Therapy Frequency (OT) other (see comments)  5-7 d/wk  -KD       Row Name 09/27/23 0940          Therapy Plan Review/Discharge Plan (OT)    Anticipated Discharge Disposition (OT) other (see comments);skilled nursing facility;inpatient rehabilitation facility;home with assist;home with home health  -KD       Row Name 09/27/23 0940          Vital Signs    Pre Systolic BP Rehab 94  -KD     Pre Treatment Diastolic BP 56  -KD     Pretreatment Heart Rate (beats/min) 66  -KD     Pre SpO2 (%) 98  -KD     O2 Delivery Pre Treatment room air  -KD     Pre Patient Position Supine  -KD     Intra Patient Position Standing  -KD     Post Patient Position Supine  -KD       Row Name 09/27/23 0946           Positioning and Restraints    Pre-Treatment Position in bed  -KD     Post Treatment Position bed  -KD     In Bed fowlers;call light within reach;encouraged to call for assist;exit alarm on  -KD               User Key  (r) = Recorded By, (t) = Taken By, (c) = Cosigned By      Initials Name Provider Type    Theodora Ruiz COTA Occupational Therapist Assistant                   Outcome Measures       Row Name 09/27/23 0940          How much help from another is currently needed...    Putting on and taking off regular lower body clothing? 4  -KD     Bathing (including washing, rinsing, and drying) 4  -KD     Toileting (which includes using toilet bed pan or urinal) 4  -KD     Putting on and taking off regular upper body clothing 4  -KD     Taking care of personal grooming (such as brushing teeth) 4  -KD     Eating meals 4  -KD     AM-PAC 6 Clicks Score (OT) 24  -KD               User Key  (r) = Recorded By, (t) = Taken By, (c) = Cosigned By      Initials Name Provider Type    Theodora Ruiz COTA Occupational Therapist Assistant                    Occupational Therapy Education       Title: PT OT SLP Therapies (In Progress)       Topic: Occupational Therapy (In Progress)       Point: ADL training (Done)       Description:   Instruct learner(s) on proper safety adaptation and remediation techniques during self care or transfers.   Instruct in proper use of assistive devices.                  Learning Progress Summary             Patient Acceptance, E,TB, VU by CM at 9/20/2023 8257    Comment: OT POC, Role of OT, d/c recommendations                         Point: Home exercise program (Not Started)       Description:   Instruct learner(s) on appropriate technique for monitoring, assisting and/or progressing therapeutic exercises/activities.                  Learner Progress:  Not documented in this visit.              Point: Precautions (Not Started)       Description:   Instruct learner(s) on prescribed  precautions during self-care and functional transfers.                  Learner Progress:  Not documented in this visit.              Point: Body mechanics (Not Started)       Description:   Instruct learner(s) on proper positioning and spine alignment during self-care, functional mobility activities and/or exercises.                  Learner Progress:  Not documented in this visit.                              User Key       Initials Effective Dates Name Provider Type Discipline    CM 11/18/22 -  Perlita Rich OT Occupational Therapist OT                  OT Recommendation and Plan  Therapy Frequency (OT): other (see comments) (5-7 d/wk)  Plan of Care Review  Plan of Care Reviewed With: patient  Progress: improving  Outcome Evaluation: OT tx complete this date. Sup>sit-Mod I. Sit>stand-SBA. Fxl mobility to BR-CGA. Toilet t/f-SBA. [facility]-Ind. Shower t/f-SBA. Pt sat on shower chair andcompleted shower. UB/LB bathing-SBA w/ LH sponge. UB/LB dressing-SBA. Grooming (oral care) comb hair-set up. Pt then returned to sup-Mod I. Pt w/ all needs in reach upon exit. Cont OT POC.     Time Calculation:         Time Calculation- OT       Row Name 09/27/23 0940             Time Calculation- OT    OT Start Time 0940  -KD      OT Stop Time 1033  -KD      OT Time Calculation (min) 53 min  -KD      Total Timed Code Minutes- OT 53 minute(s)  -KD      OT Received On 09/27/23  -KD         Timed Charges    95944 - OT Self Care/Mgmt Minutes 53  -KD         Total Minutes    Timed Charges Total Minutes 53  -KD       Total Minutes 53  -KD                User Key  (r) = Recorded By, (t) = Taken By, (c) = Cosigned By      Initials Name Provider Type    KD Theodora Lewis COTA Occupational Therapist Assistant                  Therapy Charges for Today       Code Description Service Date Service Provider Modifiers Qty    [id number] HC OT SELF CARE/MGMT/TRAIN EA 15 MIN 9/27/2023 Theodora Lewis COTA GO 4                 Theodora Lewis  [place]  9/27/2023

## 2023-09-27 NOTE — PROGRESS NOTES
Adult Nutrition  Assessment    Patient Name:  Hanna Escalante  YOB: 1965  MRN: [id number]  Admit Date:  9/17/2023    Assessment Date:  9/27/2023    Comments:  Pt notes good appetite and requests turkey sausage be ground up. She states she has no other problems chewing meats, salads/fresh fruits etc. Intakes 9/26 50/50/100% w/ Milk TID and ice cream L/D to optimize nutrition. NO other concerns at this time. RD to follow hospital course.          Electronically signed by:  Naz Mares RD  09/27/23 16:57 CDT

## 2023-09-27 NOTE — PROGRESS NOTES
[facility] Medicine Services  INPATIENT PROGRESS NOTE    Length of Stay: 3  Date of Admission: 9/17/2023  Primary Care Physician: Ninfa Bhatti APRN    Subjective   Chief Complaint: Left lower extremity weakness  HPI: Patient states she continues to improve.  States therapy is going well.    As of today, 09/27/23  Review of Systems   Constitutional:  Negative for appetite change, chills, fatigue, fever and unexpected weight change.   Respiratory:  Negative for cough, choking, chest tightness, shortness of breath and wheezing.    Cardiovascular:  Negative for chest pain, palpitations and leg swelling.   Gastrointestinal:  Negative for abdominal pain, blood in stool, constipation, diarrhea, nausea and vomiting.   Genitourinary:  Negative for dysuria, flank pain and hematuria.   Neurological:  Negative for dizziness, seizures, syncope, speech difficulty, weakness, light-headedness, numbness and headaches.   Hematological:  Does not bruise/bleed easily.      All pertinent negatives and positives are as above. All other systems have been reviewed and are negative unless otherwise stated.    Objective    Temp:  [96.6 °F (35.9 °C)-98.2 °F (36.8 °C)] 97.8 °F (36.6 °C)  Heart Rate:  [62-75] 67  Resp:  [18] 18  BP: ()/(56-85) 107/59    AM-PAC 6 Clicks Score (PT): 21 (09/26/23 1658)    As of today, 09/27/23  Physical Exam  Vitals reviewed.   Constitutional:       Appearance: She is well-developed.   HENT:      Head: Normocephalic and atraumatic.   Eyes:      Pupils: Pupils are equal, round, and reactive to light.   Cardiovascular:      Rate and Rhythm: Normal rate and regular rhythm.      Heart sounds: Normal heart sounds. No murmur heard.    No friction rub. No gallop.   Pulmonary:      Effort: Pulmonary effort is normal. No respiratory distress.      Breath sounds: Normal breath sounds. No wheezing or rales.   Chest:      Chest wall: No tenderness.   Abdominal:       General: Bowel sounds are normal. There is no distension.      Palpations: Abdomen is soft.      Tenderness: There is no abdominal tenderness. There is no guarding.   Musculoskeletal:      Cervical back: Normal range of motion and neck supple.   Skin:     General: Skin is warm and dry.   Psychiatric:         Behavior: Behavior normal.         Thought Content: Thought content normal.         Results Review:  I have reviewed the labs, radiology results, and diagnostic studies.    Laboratory Data:   Results from last 7 days   Lab Units 09/21/23  0620   SODIUM mmol/L 137   POTASSIUM mmol/L 4.5   CHLORIDE mmol/L 101   CO2 mmol/L 28.0   BUN mg/dL 25*   CREATININE mg/dL 0.98   GLUCOSE mg/dL 101*   CALCIUM mg/dL 9.3   ANION GAP mmol/L 8.0       Estimated Creatinine Clearance: 55.7 mL/min (by C-G formula based on SCr of 0.98 mg/dL).            Results from last 7 days   Lab Units 09/21/23  0620   WBC 10*3/mm3 6.02   HEMOGLOBIN g/dL 14.6   HEMATOCRIT % 43.8   PLATELETS 10*3/mm3 201             Culture Data:   No results found for: BLOODCX  No results found for: URINECX  No results found for: RESPCX  No results found for: WOUNDCX  No results found for: STOOLCX  No components found for: BODYFLD    Radiology Data:   Imaging Results (Last 24 Hours)       ** No results found for the last 24 hours. **            I have utilized all available immediate resources to obtain, update, or review the patient's current medications (including all prescriptions, over-the-counter products, herbals, cannabis/cannabidiol products, and vitamin/mineral/dietary (nutritional) supplements).       Assessment/Plan     Active Hospital Problems    Diagnosis     **Lower extremity weakness     Depression     Adjustment disorder with depressed mood     Cannabis abuse     Positive urine drug screen for methamphetamine        Plan:  1.  Left lower extremity weakness: Work-up negative so far.  Continue therapy.  Placement pending.  2.  Suicidal ideation:  Resolved.  Cleared by psych.  3.  History of polysubstance abuse: Counseled.  4.  Hypertension: Continue current treatment.  5.  DVT prophylaxis: Lovenox.      Medical Decision Making  Number and Complexity of problems: 2 highly complex and two moderately complex medical problems    Conditions and Status:        Condition is improving.     Discussed with: Patient and nursing     Treatment Plan  As above    Care Planning  Shared decision making: Patient in agreement plan of care  Code status and discussions: Full code    Disposition  Social Determinants of Health that impact treatment or disposition: None  I expect the patient to be discharged to rehab when accepted.      The patient was evaluated during the global COVID-19 pandemic, and the diagnosis was suspected/considered upon their initial presentation.  Evaluation, treatment, and testing were consistent with current guidelines for patients who present with complaints or symptoms that may be related to COVID-19.    I confirmed that the patient's Advance Care Plan is present, code status is documented, or surrogate decision maker is listed in the patient's medical record.        This document has been electronically signed by Amrit Briggs MD on September 27, 2023 12:39 CDT

## 2023-09-27 NOTE — PLAN OF CARE
Goal Outcome Evaluation:  Plan of Care Reviewed With: patient        Progress: improving  Outcome Evaluation: Pt. was Mod (I) for bed mobility and sba for sit to stand transfers and performed several during tx.  Pt. was cga for gait and amb. 8' and then 24' x 2 with seated rest break between gait trips and L LE ataxic gait.  No new goals met and pt. mobility improving and would benefit from cotinued skilled PT.

## 2023-09-27 NOTE — PLAN OF CARE
Goal Outcome Evaluation:  Plan of Care Reviewed With: patient        Progress: improving  Outcome Evaluation: OT tx complete this date. Sup>sit-Mod I. Sit>stand-SBA. Fxl mobility to BR-CGA. Toilet t/f-SBA. [facility]-Ind. Shower t/f-SBA. Pt sat on shower chair andcompleted shower. UB/LB bathing-SBA w/ LH sponge. UB/LB dressing-SBA. Grooming (oral care) comb hair-set up. Pt then returned to sup-Mod I. Pt w/ all needs in reach upon exit. Cont OT POC.      Anticipated Discharge Disposition (OT): other (see comments), skilled nursing facility, inpatient rehabilitation facility, home with assist, home with home health

## 2023-09-27 NOTE — DISCHARGE PLACEMENT REQUEST
"GardunoHanna (57 y.o. Female)       Date of Birth   1965    Social Security Number       Address   [address] [facility] [address]    Home Phone   [phone]    MRN   [id number]       [employer]   [employer]    Marital Status   Single                            Admission Date   9/17/23    Admission Type   Emergency    Admitting Provider   Collin Linares MD    Attending Provider   Edis Valero MD    Department, Room/Bed   [address], 432/1       Discharge Date       Discharge Disposition       Discharge Destination                                 Attending Provider: Edis Valero MD    Allergies: No Known Allergies    Isolation: None   Infection: None   Code Status: CPR    Ht: 154.9 cm (61\")   Wt: 71.1 kg (156 lb 12.8 oz)    Admission Cmt: None   Principal Problem: Lower extremity weakness [R29.898]                   Active Insurance as of 9/17/2023       Primary Coverage       Payor Plan Insurance Group Employer/Plan Group    [facility] MEDICARE REPLACEMENT [facility] MEDICARE REPLACEMENT [id number]       Payor Plan Address Payor Plan Phone Number Payor Plan Fax Number Effective Dates    [address] [address]   1/1/2023 - None Entered    [facility] [id number]         Subscriber Name Subscriber Birth Date Member ID       HANNA GARDUNO ANN 1965 [id number]               Secondary Coverage       Payor Plan Insurance Group Employer/Plan Group    [place]        Payor Plan Address Payor Plan Phone Number Payor Plan Fax Number Effective Dates    [address] [phone]  4/7/2020 - None Entered    [facility] [address]         Subscriber Name Subscriber Birth Date Member ID       HANNA GARDUNO ANN 1965 [id number]                     Emergency Contacts        (Rel.) Home Phone Work Phone Mobile Phone    CaseJustina (Sister) [phone] -- --    Jewell Garduno (Relative) [phone] -- [phone]    " "BRENT ANAND (Daughter) [phone] -- [phone]    \"Best Friend\",Jana (Friend) [phone] -- [phone]    LAY BURROWS (Neighbor) [phone] -- --              Vital Signs (last day)       Date/Time Temp Temp src Pulse Resp BP Patient Position SpO2    09/27/23 0730 98.2 (36.8) Temporal 69 18 94/56 Lying 94    09/27/23 0407 97.7 (36.5) Temporal 62 18 140/85 Sitting 96    09/26/23 2029 96.9 (36.1) Temporal 69 18 122/70 Lying 96    09/26/23 1447 96.6 (35.9) Temporal 75 18 109/57 Lying 92    09/26/23 1015 97.8 (36.6) Temporal 63 18 116/63 Lying 95    09/26/23 0742 98.2 (36.8) Temporal 61 18 118/60 Lying 94    09/26/23 0355 98.6 (37) Temporal 63 18 109/57 Lying 94          Oxygen Therapy (last day)       Date/Time SpO2 Device (Oxygen Therapy) Flow (L/min) Oxygen Concentration (%) ETCO2 (mmHg)    09/27/23 0730 94 room air -- -- --    09/27/23 0407 96 room air -- -- --    09/26/23 2036 -- room air -- -- --    09/26/23 2029 96 room air -- -- --    09/26/23 1447 92 room air -- -- --    09/26/23 1015 95 room air -- -- --    09/26/23 0742 94 room air -- -- --    09/26/23 0355 94 room air -- -- --          Current Facility-Administered Medications   Medication Dose Route Frequency Provider Last Rate Last Admin    acetaminophen (TYLENOL) tablet 650 mg  650 mg Oral Q4H PRN Collin Linares MD        Or    acetaminophen (TYLENOL) 160 MG/5ML oral solution 650 mg  650 mg Oral Q4H PRN Collin Linares MD   650 mg at 09/19/23 1401    Or    acetaminophen (TYLENOL) suppository 650 mg  650 mg Rectal Q4H PRN Collin Linares MD        sennosides-docusate (PERICOLACE) 8.6-50 MG per tablet 2 tablet  2 tablet Oral BID Collin Linares MD   2 tablet at 09/27/23 0929    And    polyethylene glycol (MIRALAX) packet 17 g  17 g Oral Daily PRN Collin Linares MD   17 g at 09/19/23 0830    And    bisacodyl (DULCOLAX) EC tablet 5 mg  5 mg Oral Daily PRN Collin Linares MD   5 mg at 09/26/23 1617    And    bisacodyl (DULCOLAX) suppository 10 mg  " 10 mg Rectal Daily PRN Collin Linares MD        calcium carbonate (TUMS) chewable tablet 500 mg (200 mg elemental)  1 tablet Oral BID PRN Collin Linares MD        Calcium Replacement - Follow Nurse / BPA Driven Protocol   Does not apply PRN Collin Linares MD        cholecalciferol (VITAMIN D3) capsule 50,000 Units  50,000 Units Oral Q7 Days Edis Valero MD   50,000 Units at 09/22/23 1045    cyclobenzaprine (FLEXERIL) tablet 10 mg  10 mg Oral TID PRN Katrin Domingo PA-C   10 mg at 09/25/23 2100    DULoxetine (CYMBALTA) DR capsule 20 mg  20 mg Oral Daily Collin Linares MD   20 mg at 09/27/23 0930    Enoxaparin Sodium (LOVENOX) syringe 40 mg  40 mg Subcutaneous Daily Collin Linares MD   40 mg at 09/27/23 0930    Hold medication  1 each Does not apply Continuous PRN Justo Avina APRN        HYDROcodone-acetaminophen (NORCO) 7.5-325 MG per tablet 1 tablet  1 tablet Oral Q4H PRN Francesco Carlson MD   1 tablet at 09/27/23 0928    lisinopril (PRINIVIL,ZESTRIL) tablet 10 mg  10 mg Oral BID Collin Linares MD   10 mg at 09/27/23 0930    Magnesium Low Dose Replacement - Follow Nurse / BPA Driven Protocol   Does not apply PRN Collin Linares MD        melatonin tablet 5 mg  5 mg Oral Nightly PRN Collin Linares MD   5 mg at 09/27/23 0027    metoclopramide (REGLAN) tablet 10 mg  10 mg Oral Q6H Amrit Briggs MD   10 mg at 09/27/23 0930    naloxone (NARCAN) injection 0.4 mg  0.4 mg Intravenous Q5 Min PRN Collin Linares MD        naloxone (NARCAN) injection 0.4 mg  0.4 mg Intravenous Q5 Min PRN Collin Linares MD        nicotine (NICODERM CQ) 21 MG/24HR patch 1 patch  1 patch Transdermal Q24H Collin Linares MD   1 patch at 09/26/23 2038    nitroglycerin (NITROSTAT) SL tablet 0.4 mg  0.4 mg Sublingual Q5 Min PRN Collin Linares MD        ondansetron (ZOFRAN) tablet 4 mg  4 mg Oral Q6H PRN Collin Linares MD        Or    ondansetron (ZOFRAN) injection 4 mg  4 mg Intravenous Q6H PRN Collin Linares MD   4 mg at 09/18/23  1219    Phosphorus Replacement - Follow Nurse / BPA Driven Protocol   Does not apply PRN Collin Linares MD        Potassium Replacement - Follow Nurse / BPA Driven Protocol   Does not apply PRN Collin Linares MD        sodium chloride 0.9 % flush 10 mL  10 mL Intravenous Q12H Collin Linares MD   10 mL at 09/26/23 0927    sodium chloride 0.9 % flush 10 mL  10 mL Intravenous PRN Collin Linares MD   10 mL at 09/21/23 0641    sodium chloride 0.9 % infusion 40 mL  40 mL Intravenous PRN Collin Linares MD        SUMAtriptan (IMITREX) tablet 50 mg  50 mg Oral Q2H PRN Collin Linares MD            Physician Progress Notes (last 24 hours)        Amrit Briggs MD at 09/26/23 1834              [facility] Medicine Services  INPATIENT PROGRESS NOTE    Length of Stay: 2  Date of Admission: 9/17/2023  Primary Care Physician: Ninfa Bhatti APRN    Subjective   Chief Complaint: Left lower extremity weakness  HPI: Patient states she is better.  States she is working well with therapy.    As of today, 09/26/23  Review of Systems   Constitutional:  Negative for appetite change, chills, fatigue, fever and unexpected weight change.   Respiratory:  Negative for cough, choking, chest tightness, shortness of breath and wheezing.    Cardiovascular:  Negative for chest pain, palpitations and leg swelling.   Gastrointestinal:  Negative for abdominal pain, blood in stool, constipation, diarrhea, nausea and vomiting.   Genitourinary:  Negative for dysuria, flank pain and hematuria.   Neurological:  Negative for dizziness, seizures, syncope, speech difficulty, weakness, light-headedness, numbness and headaches.   Hematological:  Does not bruise/bleed easily.      All pertinent negatives and positives are as above. All other systems have been reviewed and are negative unless otherwise stated.    Objective    Temp:  [96.6 °F (35.9 °C)-98.6 °F (37 °C)] 96.6 °F (35.9 °C)  Heart Rate:  [61-75]  75  Resp:  [18] 18  BP: (109-118)/(57-64) 109/57    AM-PAC 6 Clicks Score (PT): 21 (09/26/23 1658)    As of today, 09/26/23  Physical Exam  Vitals reviewed.   Constitutional:       Appearance: She is well-developed.   HENT:      Head: Normocephalic and atraumatic.   Eyes:      Pupils: Pupils are equal, round, and reactive to light.   Cardiovascular:      Rate and Rhythm: Normal rate and regular rhythm.      Heart sounds: Normal heart sounds. No murmur heard.    No friction rub. No gallop.   Pulmonary:      Effort: Pulmonary effort is normal. No respiratory distress.      Breath sounds: Normal breath sounds. No wheezing or rales.   Chest:      Chest wall: No tenderness.   Abdominal:      General: Bowel sounds are normal. There is no distension.      Palpations: Abdomen is soft.      Tenderness: There is no abdominal tenderness. There is no guarding.   Musculoskeletal:      Cervical back: Normal range of motion and neck supple.   Skin:     General: Skin is warm and dry.   Psychiatric:         Behavior: Behavior normal.         Thought Content: Thought content normal.         Results Review:  I have reviewed the labs, radiology results, and diagnostic studies.    Laboratory Data:   Results from last 7 days   Lab Units 09/21/23  0620 09/20/23  0511   SODIUM mmol/L 137 137   POTASSIUM mmol/L 4.5 4.1   CHLORIDE mmol/L 101 101   CO2 mmol/L 28.0 28.0   BUN mg/dL 25* 16   CREATININE mg/dL 0.98 0.92   GLUCOSE mg/dL 101* 101*   CALCIUM mg/dL 9.3 8.9   ANION GAP mmol/L 8.0 8.0       Estimated Creatinine Clearance: 55.7 mL/min (by C-G formula based on SCr of 0.98 mg/dL).            Results from last 7 days   Lab Units 09/21/23  0620 09/20/23  0511   WBC 10*3/mm3 6.02 6.43   HEMOGLOBIN g/dL 14.6 14.2   HEMATOCRIT % 43.8 42.4   PLATELETS 10*3/mm3 201 212             Culture Data:   No results found for: BLOODCX  No results found for: URINECX  No results found for: RESPCX  No results found for: WOUNDCX  No results found for:  STOOLCX  No components found for: BODYFLD    Radiology Data:   Imaging Results (Last 24 Hours)       ** No results found for the last 24 hours. **            I have utilized all available immediate resources to obtain, update, or review the patient's current medications (including all prescriptions, over-the-counter products, herbals, cannabis/cannabidiol products, and vitamin/mineral/dietary (nutritional) supplements).       Assessment/Plan     Active Hospital Problems    Diagnosis     **Lower extremity weakness     Depression     Adjustment disorder with depressed mood     Cannabis abuse     Positive urine drug screen for methamphetamine        Plan:  1.  Left lower extremity weakness: Work-up negative so far.  Continue therapy.  Placement pending.  2.  Suicidal ideation: Resolved.  Cleared by psych.  3.  History of polysubstance abuse: Counseled.  4.  Hypertension: Continue current treatment.  5.  DVT prophylaxis: Lovenox.      Medical Decision Making  Number and Complexity of problems: 2 highly complex and two moderately complex medical problems    Conditions and Status:        Condition is improving.     Discussed with: Patient and nursing     Treatment Plan  As above    Care Planning  Shared decision making: Patient in agreement plan of care  Code status and discussions: Full code    Disposition  Social Determinants of Health that impact treatment or disposition: None  I expect the patient to be discharged to rehab when accepted.      The patient was evaluated during the global COVID-19 pandemic, and the diagnosis was suspected/considered upon their initial presentation.  Evaluation, treatment, and testing were consistent with current guidelines for patients who present with complaints or symptoms that may be related to COVID-19.    I confirmed that the patient's Advance Care Plan is present, code status is documented, or surrogate decision maker is listed in the patient's medical record.        This document  has been electronically signed by Amrit Briggs MD on September 26, 2023 18:34 CDT        Electronically signed by Amrit Briggs MD at 09/26/23 1840     Issa Ventura COTA   Occupational Therapist Assistant  Physical Medicine and Rehabilitation     Plan of Care      Signed     Date of Service: 09/26/23 1710  Creation Time: 09/26/23 1710     Signed         Goal Outcome Evaluation:  Plan of Care Reviewed With: patient  Progress: improving  Outcome Evaluation: Pt mod I for all bed mobility, SBA for sit< > stand, CGA/MIn A for t/f EOB < > toilet  with RW 2/2 decreased sensation/control of LLE. Pt peformed B UE ther ex 2 x 20 via yellow theraband. Pt cooperative and pleasant.        Anticipated Discharge Disposition (OT): other (see comments), skilled nursing facility, inpatient rehabilitation facility, home with assist, home with home health            Issa Ventura COTA   Occupational Therapist Assistant  Physical Medicine and Rehabilitation     Plan of Care      Signed     Date of Service: 09/26/23 1710  Creation Time: 09/26/23 1710     Signed         Goal Outcome Evaluation:  Plan of Care Reviewed With: patient  Progress: improving  Outcome Evaluation: Pt mod I for all bed mobility, SBA for sit< > stand, CGA/MIn A for t/f EOB < > toilet  with RW 2/2 decreased sensation/control of LLE. Pt peformed B UE ther ex 2 x 20 via yellow theraband. Pt cooperative and pleasant.        Anticipated Discharge Disposition (OT): other (see comments), skilled nursing facility, inpatient rehabilitation facility, home with assist, home with home health

## 2023-09-27 NOTE — PLAN OF CARE
Goal Outcome Evaluation:  Plan of Care Reviewed With: patient        Progress: no change  Outcome Evaluation: Pt resting in bed at this time, no falls noted, no new skin issues, pain controlled with medication and rest, no suicidal ideations, pt has been very pleasant

## 2023-09-28 PROCEDURE — 97116 GAIT TRAINING THERAPY: CPT

## 2023-09-28 PROCEDURE — 25010000002 ENOXAPARIN PER 10 MG: Performed by: INTERNAL MEDICINE

## 2023-09-28 PROCEDURE — 97110 THERAPEUTIC EXERCISES: CPT

## 2023-09-28 RX ORDER — TEMAZEPAM 15 MG/1
15 CAPSULE ORAL NIGHTLY PRN
Status: DISCONTINUED | OUTPATIENT
Start: 2023-09-28 | End: 2023-09-30 | Stop reason: HOSPADM

## 2023-09-28 RX ADMIN — METOCLOPRAMIDE 10 MG: 10 TABLET ORAL at 20:15

## 2023-09-28 RX ADMIN — DOCUSATE SODIUM 50 MG AND SENNOSIDES 8.6 MG 2 TABLET: 8.6; 5 TABLET, FILM COATED ORAL at 20:09

## 2023-09-28 RX ADMIN — LISINOPRIL 10 MG: 10 TABLET ORAL at 08:41

## 2023-09-28 RX ADMIN — HYDROCODONE BITARTRATE AND ACETAMINOPHEN 1 TABLET: 7.5; 325 TABLET ORAL at 02:15

## 2023-09-28 RX ADMIN — TEMAZEPAM 15 MG: 15 CAPSULE ORAL at 20:09

## 2023-09-28 RX ADMIN — LISINOPRIL 10 MG: 10 TABLET ORAL at 20:07

## 2023-09-28 RX ADMIN — HYDROCODONE BITARTRATE AND ACETAMINOPHEN 1 TABLET: 7.5; 325 TABLET ORAL at 14:23

## 2023-09-28 RX ADMIN — DOCUSATE SODIUM 50 MG AND SENNOSIDES 8.6 MG 2 TABLET: 8.6; 5 TABLET, FILM COATED ORAL at 08:40

## 2023-09-28 RX ADMIN — HYDROCODONE BITARTRATE AND ACETAMINOPHEN 1 TABLET: 7.5; 325 TABLET ORAL at 08:41

## 2023-09-28 RX ADMIN — ENOXAPARIN SODIUM 40 MG: 40 INJECTION SUBCUTANEOUS at 08:43

## 2023-09-28 RX ADMIN — METOCLOPRAMIDE 10 MG: 10 TABLET ORAL at 16:52

## 2023-09-28 RX ADMIN — HYDROCODONE BITARTRATE AND ACETAMINOPHEN 1 TABLET: 7.5; 325 TABLET ORAL at 20:13

## 2023-09-28 RX ADMIN — METOCLOPRAMIDE 10 MG: 10 TABLET ORAL at 12:10

## 2023-09-28 RX ADMIN — DULOXETINE HYDROCHLORIDE 20 MG: 20 CAPSULE, DELAYED RELEASE ORAL at 08:40

## 2023-09-28 RX ADMIN — NICOTINE 1 PATCH: 21 PATCH, EXTENDED RELEASE TRANSDERMAL at 20:07

## 2023-09-28 RX ADMIN — METOCLOPRAMIDE 10 MG: 10 TABLET ORAL at 04:54

## 2023-09-28 NOTE — THERAPY TREATMENT NOTE
Acute Care - Physical Therapy Treatment Note  [facility]     Patient Name: Hanna Escalante  : 1965  MRN: [id number]  Today's Date: 2023      Visit Dx:     ICD-10-CM ICD-9-CM   1. Weakness of left lower extremity  R29.898 729.89   2. Impaired functional mobility, balance, gait, and endurance [Z74.09 (ICD-10-CM)]  Z74.09 V49.89   3. Impaired mobility and ADLs [Z74.09, Z78.9 (ICD-10-CM)]  Z74.09 V49.89    Z78.9      Patient Active Problem List   Diagnosis    Screening for malignant neoplasm of colon    Neck pain    Tobacco dependence    Insomnia    Back pain    Lower extremity weakness    Depression    Adjustment disorder with depressed mood    Cannabis abuse    Positive urine drug screen for methamphetamine     Past Medical History:   Diagnosis Date    Allergic     Arthritis     COPD (chronic obstructive pulmonary disease)     GERD (gastroesophageal reflux disease)     Hypertension     Infectious viral hepatitis     Low back pain      Past Surgical History:   Procedure Laterality Date    CHOLECYSTECTOMY      COLONOSCOPY N/A 2020    Procedure: COLONOSCOPY;  Surgeon: Joshua Perry MD;  Location: [facility] ENDOSCOPY;  Service: General    HYSTERECTOMY       PT Assessment (last 12 hours)       PT Evaluation and Treatment       Row Name 23 1007          Physical Therapy Time and Intention    Subjective Information no complaints  -CA     Document Type therapy note (daily note)  -CA     Mode of Treatment individual therapy;physical therapy  -CA       Row Name 23 1007          General Information    Existing Precautions/Restrictions fall  -CA       Row Name 23 1007          Pain    Pretreatment Pain Rating 0/10 - no pain  -CA     Posttreatment Pain Rating 0/10 - no pain  -CA       Row Name 23 1007          Cognition    Orientation Status (Cognition) oriented x 4  -CA       Row Name 23 1007          Bed Mobility    Rolling Left [place] (Bed Mobility) modified  independence  -CA     Scooting/Bridging [place] (Bed Mobility) modified independence  -CA     Supine-Sit [place] (Bed Mobility) modified independence  -CA     Sit-Supine [place] (Bed Mobility) modified independence  -CA     Assistive Device (Bed Mobility) bed rails;head of bed elevated  -CA       Row Name 09/28/23 1007          Transfers    Transfers sit-stand transfer;stand-sit transfer  -CA       Row Name 09/28/23 1007          Bed-Chair Transfer    Bed-Chair [place] (Transfers) contact guard  -CA     Assistive Device (Bed-Chair Transfers) walker, front-wheeled  -CA       Row Name 09/28/23 1007          Sit-Stand Transfer    Sit-Stand [place] (Transfers) standby assist  -CA     Assistive Device (Sit-Stand Transfers) walker, front-wheeled  -CA       Row Name 09/28/23 1007          Stand-Sit Transfer    Stand-Sit [place] (Transfers) standby assist  -CA     Assistive Device (Stand-Sit Transfers) walker, front-wheeled  -CA       Row Name 09/28/23 1007          Toilet Transfer    Type (Toilet Transfer) sit-stand;stand-sit  -CA     [place] Level (Toilet Transfer) standby assist;contact guard  -CA     Assistive Device (Toilet Transfer) walker, front-wheeled  -CA       Row Name 09/28/23 1007          Gait/Stairs (Locomotion)    [place] Level (Gait) contact guard  -CA     Assistive Device (Gait) walker, front-wheeled  -CA     Distance in Feet (Gait) 24' 42' and 8'  -CA     Pattern (Gait) step-to  -CA     Deviations/Abnormal Patterns (Gait) stride length decreased;gait speed decreased;ataxic  -CA     Bilateral Gait Deviations forward flexed posture  -CA       Row Name 09/28/23 1007          Knee (Therapeutic Exercise)    Knee (Therapeutic Exercise) AROM (active range of motion)  -CA     Knee AROM (Therapeutic Exercise) other (see comments)  quad sets  -CA       Row Name 09/28/23 1007          Ankle (Therapeutic Exercise)    Ankle (Therapeutic Exercise) AROM (active range of motion)   -CA     Ankle AROM (Therapeutic Exercise) bilateral;dorsiflexion;plantarflexion  -CA       Row Name 09/28/23 1007          Vital Signs    Pre Patient Position Supine  -CA     Intra Patient Position Standing  -CA     Post Patient Position Supine  -CA       Row Name 09/28/23 1007          Positioning and Restraints    Pre-Treatment Position in bed  -CA     Post Treatment Position bed  -CA     In Bed fowlers;call light within reach;encouraged to call for assist;exit alarm on  -CA               User Key  (r) = Recorded By, (t) = Taken By, (c) = Cosigned By      Initials Name Provider Type    CA Dany Whitaker, PTA Physical Therapist Assistant                    Physical Therapy Education       Title: PT OT SLP Therapies (In Progress)       Topic: Physical Therapy (In Progress)       Point: Mobility training (In Progress)       Learning Progress Summary             Patient Acceptance, E, NR by  at 9/20/2023 1052    Comment: PT POC, hand  placenent with transfers, rehab process.                         Point: Home exercise program (Not Started)       Learner Progress:  Not documented in this visit.              Point: Body mechanics (Not Started)       Learner Progress:  Not documented in this visit.              Point: Precautions (Not Started)       Learner Progress:  Not documented in this visit.                              User Key       Initials Effective Dates Name Provider Type Discipline     07/11/23 -  Raul Leung, PT Physical Therapist PT                  PT Recommendation and Plan     Plan of Care Reviewed With: patient  Progress: improving  Outcome Evaluation: Pt. was mod(I) for bed mobility and sba/cga for transfers and amb. 24' 42' and 8' with RW and cga and ataxic gait.  Pt. showed some improvement with endurance and strength this tx. and better control.  Pt. also performed a couple exercies in  bed post gait.  Pt. met no new goals and would benefit from continued skilled PT for gait quality.        Time Calculation:    PT Charges       Row Name 09/28/23 1120             Time Calculation    Start Time 1007  -CA      Stop Time 1031  -CA      Time Calculation (min) 24 min  -CA      PT Received On 09/28/23  -CA         Time Calculation- PT    Total Timed Code Minutes- PT 24 minute(s)  -CA                User Key  (r) = Recorded By, (t) = Taken By, (c) = Cosigned By      Initials Name Provider Type    Dany Rhodes PTA Physical Therapist Assistant                  Therapy Charges for Today       Code Description Service Date Service Provider Modifiers Qty    77713789886 HC GAIT TRAINING EA 15 MIN 9/27/2023 Dany Whitaker, PTA GP 1    69492351839 HC PT THERAPEUTIC ACT EA 15 MIN 9/27/2023 Dany Whitaker, NEVIN GP 1    44615661785 HC GAIT TRAINING EA 15 MIN 9/28/2023 Dany Whitaker, NEVIN GP 2            PT G-Codes  Outcome Measure Options: AM-PAC 6 Clicks Basic Mobility (PT)  AM-PAC 6 Clicks Score (PT): 21  AM-PAC 6 Clicks Score (OT): 24    Dany Whitaker PTA  9/28/2023

## 2023-09-28 NOTE — PROGRESS NOTES
[facility] Medicine Services  INPATIENT PROGRESS NOTE    Length of Stay: 4  Date of Admission: 9/17/2023  Primary Care Physician: Ninfa Bhatti APRN    Subjective   Chief Complaint: Left lower extremity weakness  HPI: Patient states she is feeling well.  States she is able to ambulate some with help and a walker.  Working with therapy.    As of today, 09/28/23  Review of Systems   Constitutional:  Negative for appetite change, chills, fatigue, fever and unexpected weight change.   Respiratory:  Negative for cough, choking, chest tightness, shortness of breath and wheezing.    Cardiovascular:  Negative for chest pain, palpitations and leg swelling.   Gastrointestinal:  Negative for abdominal pain, blood in stool, constipation, diarrhea, nausea and vomiting.   Genitourinary:  Negative for dysuria, flank pain and hematuria.   Neurological:  Negative for dizziness, seizures, syncope, speech difficulty, weakness, light-headedness, numbness and headaches.   Hematological:  Does not bruise/bleed easily.      All pertinent negatives and positives are as above. All other systems have been reviewed and are negative unless otherwise stated.    Objective    Temp:  [97.3 °F (36.3 °C)-97.7 °F (36.5 °C)] 97.7 °F (36.5 °C)  Heart Rate:  [65-76] 70  Resp:  [18] 18  BP: (112-123)/(61-78) 117/71    AM-PAC 6 Clicks Score (PT): 21 (09/28/23 0840)    As of today, 09/28/23  Physical Exam  Vitals reviewed.   Constitutional:       Appearance: She is well-developed.   HENT:      Head: Normocephalic and atraumatic.   Eyes:      Pupils: Pupils are equal, round, and reactive to light.   Cardiovascular:      Rate and Rhythm: Normal rate and regular rhythm.      Heart sounds: Normal heart sounds. No murmur heard.    No friction rub. No gallop.   Pulmonary:      Effort: Pulmonary effort is normal. No respiratory distress.      Breath sounds: Normal breath sounds. No wheezing or rales.   Chest:       Chest wall: No tenderness.   Abdominal:      General: Bowel sounds are normal. There is no distension.      Palpations: Abdomen is soft.      Tenderness: There is no abdominal tenderness. There is no guarding.   Musculoskeletal:      Cervical back: Normal range of motion and neck supple.   Skin:     General: Skin is warm and dry.   Psychiatric:         Behavior: Behavior normal.         Thought Content: Thought content normal.         Results Review:  I have reviewed the labs, radiology results, and diagnostic studies.    Culture Data:   No results found for: BLOODCX  No results found for: URINECX  No results found for: RESPCX  No results found for: WOUNDCX  No results found for: STOOLCX  No components found for: BODYFLD    Radiology Data:   Imaging Results (Last 24 Hours)       ** No results found for the last 24 hours. **            I have utilized all available immediate resources to obtain, update, or review the patient's current medications (including all prescriptions, over-the-counter products, herbals, cannabis/cannabidiol products, and vitamin/mineral/dietary (nutritional) supplements).       Assessment/Plan     Active Hospital Problems    Diagnosis     **Lower extremity weakness     Depression     Adjustment disorder with depressed mood     Cannabis abuse     Positive urine drug screen for methamphetamine        Plan:  1.  Left lower extremity weakness: Work-up negative.  Improving.  Continue therapy.  Placement pending.  2.  Suicidal ideation: Resolved.  Cleared by psych.  3.  History of polysubstance abuse: Counseled.  4.  Hypertension: Continue current treatment.  5.  DVT prophylaxis: Lovenox.      Medical Decision Making  Number and Complexity of problems: 2 highly complex and two moderately complex medical problems    Conditions and Status:        Condition is improving.     Discussed with: Patient and nursing     Treatment Plan  As above    Care Planning  Shared decision making: Patient in  agreement plan of care  Code status and discussions: Full code    Disposition  Social Determinants of Health that impact treatment or disposition: None  I expect the patient to be discharged to rehab versus home tomorrow.      The patient was evaluated during the global COVID-19 pandemic, and the diagnosis was suspected/considered upon their initial presentation.  Evaluation, treatment, and testing were consistent with current guidelines for patients who present with complaints or symptoms that may be related to COVID-19.    I confirmed that the patient's Advance Care Plan is present, code status is documented, or surrogate decision maker is listed in the patient's medical record.        This document has been electronically signed by Amrit Briggs MD on September 28, 2023 17:14 CDT

## 2023-09-28 NOTE — THERAPY TREATMENT NOTE
Patient Name: Hanna Escalante  : 1965    MRN: [id number]                              Today's Date: 2023       Admit Date: 2023    Visit Dx:     ICD-10-CM ICD-9-CM   1. Weakness of left lower extremity  R29.898 729.89   2. Impaired functional mobility, balance, gait, and endurance [Z74.09 (ICD-10-CM)]  Z74.09 V49.89   3. Impaired mobility and ADLs [Z74.09, Z78.9 (ICD-10-CM)]  Z74.09 V49.89    Z78.9      Patient Active Problem List   Diagnosis    Screening for malignant neoplasm of colon    Neck pain    Tobacco dependence    Insomnia    Back pain    Lower extremity weakness    Depression    Adjustment disorder with depressed mood    Cannabis abuse    Positive urine drug screen for methamphetamine     Past Medical History:   Diagnosis Date    Allergic     Arthritis     COPD (chronic obstructive pulmonary disease)     GERD (gastroesophageal reflux disease)     Hypertension     Infectious viral hepatitis     Low back pain      Past Surgical History:   Procedure Laterality Date    CHOLECYSTECTOMY      COLONOSCOPY N/A 2020    Procedure: COLONOSCOPY;  Surgeon: Joshua Perry MD;  Location: [facility] ENDOSCOPY;  Service: General    HYSTERECTOMY        General Information       Row Name 23 1355          OT Time and Intention    Document Type therapy note (daily note)  -KD     Mode of Treatment individual therapy;occupational therapy  -KD       Row Name 23 1357          General Information    Patient Profile Reviewed yes  -KD     Existing Precautions/Restrictions fall  -KD       Row Name 23 1355          Cognition    Orientation Status (Cognition) oriented x 4  -KD       Row Name 23 1355          Safety Issues, Functional Mobility    Impairments Affecting Function (Mobility) endurance/activity tolerance;coordination;balance;pain  -KD               User Key  (r) = Recorded By, (t) = Taken By, (c) = Cosigned By      Initials Name Provider Type    KD Theodora Lewis COTA  Occupational Therapist Assistant                     Mobility/ADL's       Row Name 09/28/23 1355          Bed Mobility    Supine-Sit [place] (Bed Mobility) modified independence  -KD     Sit-Supine [place] (Bed Mobility) modified independence  -KD     Assistive Device (Bed Mobility) bed rails;head of bed elevated  -       Row Name 09/28/23 1355          Sit-Stand Transfer    Sit-Stand [place] (Transfers) standby assist  -KD     Assistive Device (Sit-Stand Transfers) walker, front-wheeled  -KD       Row Name 09/28/23 [facility]          Stand-Sit Transfer    Stand-Sit [place] (Transfers) standby assist  -KD     Assistive Device (Stand-Sit Transfers) walker, front-wheeled  -KD       Row Name 09/28/23 [facility]          Toilet Transfer    [place] Level (Toilet Transfer) standby assist;contact guard  -     Assistive Device (Toilet Transfer) walker, front-wheeled  -KD       Row Name 09/28/23 [facility]          Functional Mobility    Functional Mobility- Ind. Level contact guard assist  -     Functional Mobility- Device walker, front-wheeled  -     Functional Mobility-Distance (Feet) 16  -       Row Name 09/28/23 [facility]          Activities of Daily Living    BADL Assessment/Intervention toileting  -       Row Name 09/28/23 [facility]          Toileting Assessment/Training    [place] Level (Toileting) toileting skills;adjust/manage clothing;perform perineal hygiene;independent  -     Assistive Devices (Toileting) commode  -     Position (Toileting) unsupported sitting  -               User Key  (r) = Recorded By, (t) = Taken By, (c) = Cosigned By      Initials Name Provider Type     Theodora Lewis COTA Occupational Therapist Assistant                   Obj/Interventions       Row Name 09/28/23 [facility]          Shoulder (Therapeutic Exercise)    Shoulder (Therapeutic Exercise) AROM (active range of motion)  -     Shoulder AROM (Therapeutic Exercise)  bilateral;flexion;extension;aBduction;aDduction;external rotation;internal rotation;horizontal aBduction/aDduction  -       Row Name 09/28/23 1355          Elbow/Forearm (Therapeutic Exercise)    Elbow/Forearm (Therapeutic Exercise) AROM (active range of motion)  -KD     Elbow/Forearm AROM (Therapeutic Exercise) bilateral;flexion;extension;supination;pronation  -       Row Name 09/28/23 1355          Wrist (Therapeutic Exercise)    Wrist (Therapeutic Exercise) AROM (active range of motion)  -     Wrist AROM (Therapeutic Exercise) bilateral;flexion;extension  -       Row Name 09/28/23 1355          Hand (Therapeutic Exercise)    Hand (Therapeutic Exercise) AROM (active range of motion)  -KD     Hand AROM/AAROM (Therapeutic Exercise) bilateral;finger flexion;finger extension  -       Row Name 09/28/23 1355          Motor Skills    Therapeutic Exercise shoulder;elbow/forearm;wrist;hand  -KD               User Key  (r) = Recorded By, (t) = Taken By, (c) = Cosigned By      Initials Name Provider Type     Theodora Lewis COTA Occupational Therapist Assistant                   Goals/Plan       Row Name 09/28/23 2454          Transfer Goal 1 (OT)    Activity/Assistive Device (Transfer Goal 1, OT) toilet  -KD     [place] Level/Cues Needed (Transfer Goal 1, OT) supervision required  -KD     Time Frame (Transfer Goal 1, OT) long term goal (LTG);by discharge  -KD     Progress/Outcome (Transfer Goal 1, OT) goal not met  -       Row Name 09/28/23 0066          Bathing Goal 1 (OT)    Activity/Device (Bathing Goal 1, OT) lower body bathing  -KD     [place] Level/Cues Needed (Bathing Goal 1, OT) supervision required  -KD     Time Frame (Bathing Goal 1, OT) long term goal (LTG);by discharge  -KD     Progress/Outcomes (Bathing Goal 1, OT) goal met  -       Row Name 09/28/23 7955          Dressing Goal 1 (OT)    Activity/Device (Dressing Goal 1, OT) lower body dressing  -KD     [place]/Cues Needed  (Dressing Goal 1, OT) supervision required  -KD     Time Frame (Dressing Goal 1, OT) long term goal (LTG);by discharge  -KD     Progress/Outcome (Dressing Goal 1, OT) goal met  -KD       Row Name 09/28/23 5145          Problem Specific Goal 1 (OT)    Problem Specific Goal 1 (OT) Pt will tolerate at least 4 continuous minutes of standing ADL with SPV or less and zero LOB for increased balance required for ADLs and mobility.  -KD     Time Frame (Problem Specific Goal 1, OT) long term goal (LTG);by discharge  -KD     Progress/Outcome (Problem Specific Goal 1, OT) goal not met  -KD               User Key  (r) = Recorded By, (t) = Taken By, (c) = Cosigned By      Initials Name Provider Type     Theodora Lewis COTA Occupational Therapist Assistant                   Clinical Impression       Row Name 09/28/23 3284          Pain Assessment    Pretreatment Pain Rating 0/10 - no pain  -KD     Posttreatment Pain Rating 0/10 - no pain  -KD       Row Name 09/28/23 0907          Plan of Care Review    Plan of Care Reviewed With patient;friend  -KD     Progress improving  -KD     Outcome Evaluation Sup>sit-Ind. Sit>stand-CGA, Fxl mobility ~8' x2 to and from BR w/ CGA and RW. Toilet t/f-CGA. Pericare hygiene-Ind. Grooming-set up. Pt participated in BUE ther ex w/ 2lb HW and good tolerance. Sit>sup-Ind. Pt w/ all needs in reach upon exit. Cont OT POC.  -KD       Row Name 09/28/23 8426          Therapy Assessment/Plan (OT)    Therapy Frequency (OT) other (see comments)  5-7 d/wk  -KD       Row Name 09/28/23 7437          Therapy Plan Review/Discharge Plan (OT)    Anticipated Discharge Disposition (OT) other (see comments);skilled nursing facility;inpatient rehabilitation facility;home with assist;home with home health  -KD       Row Name 09/28/23 8041          Vital Signs    Pre Systolic BP Rehab 117  -KD     Pre Treatment Diastolic BP 71  -KD     Pretreatment Heart Rate (beats/min) 70  -KD     Pre SpO2 (%) 94  -KD     O2  Delivery Pre Treatment room air  -KD     Pre Patient Position Supine  -KD     Intra Patient Position Standing  -KD     Post Patient Position Supine  -KD       Row Name 09/28/23 1350          Positioning and Restraints    Pre-Treatment Position in bed  -KD     Post Treatment Position bed  -KD     In Bed side lying left;call light within reach;encouraged to call for assist;exit alarm on  -KD               User Key  (r) = Recorded By, (t) = Taken By, (c) = Cosigned By      Initials Name Provider Type    Theodora Ruiz COTA Occupational Therapist Assistant                   Outcome Measures       Row Name 09/28/23 1355          How much help from another is currently needed...    Putting on and taking off regular lower body clothing? 4  -KD     Bathing (including washing, rinsing, and drying) 4  -KD     Toileting (which includes using toilet bed pan or urinal) 4  -KD     Putting on and taking off regular upper body clothing 4  -KD     Taking care of personal grooming (such as brushing teeth) 4  -KD     Eating meals 4  -KD     AM-PAC 6 Clicks Score (OT) 24  -KD       Row Name 09/28/23 0840          How much help from another person do you currently need...    Turning from your back to your side while in flat bed without using bedrails? 4  -JH     Moving from lying on back to sitting on the side of a flat bed without bedrails? 4  -JH     Moving to and from a bed to a chair (including a wheelchair)? 3  -JH     Standing up from a chair using your arms (e.g., wheelchair, bedside chair)? 4  -JH     Climbing 3-5 steps with a railing? 3  -JH     To walk in hospital room? 3  -JH     AM-PAC 6 Clicks Score (PT) 21  -JH     Highest level of mobility 6 --> Walked 10 steps or more  -               User Key  (r) = Recorded By, (t) = Taken By, (c) = Cosigned By      Initials Name Provider Type    Theodora Ruiz COTA Occupational Therapist Assistant    Qiana Harrison LPN Licensed Nurse                    Occupational  Therapy Education       Title: PT OT SLP Therapies (In Progress)       Topic: Occupational Therapy (In Progress)       Point: ADL training (Done)       Description:   Instruct learner(s) on proper safety adaptation and remediation techniques during self care or transfers.   Instruct in proper use of assistive devices.                  Learning Progress Summary             Patient Acceptance, E,TB, VU by CM at 9/20/2023 5153    Comment: OT POC, Role of OT, d/c recommendations                         Point: Home exercise program (Not Started)       Description:   Instruct learner(s) on appropriate technique for monitoring, assisting and/or progressing therapeutic exercises/activities.                  Learner Progress:  Not documented in this visit.              Point: Precautions (Not Started)       Description:   Instruct learner(s) on prescribed precautions during self-care and functional transfers.                  Learner Progress:  Not documented in this visit.              Point: Body mechanics (Not Started)       Description:   Instruct learner(s) on proper positioning and spine alignment during self-care, functional mobility activities and/or exercises.                  Learner Progress:  Not documented in this visit.                              User Key       Initials Effective Dates Name Provider Type Discipline     11/18/22 -  Perlita Rich OT Occupational Therapist OT                  OT Recommendation and Plan  Therapy Frequency (OT): other (see comments) (5-7 d/wk)  Plan of Care Review  Plan of Care Reviewed With: patient, friend  Progress: improving  Outcome Evaluation: Sup>sit-Ind. Sit>stand-CGA, Fxl mobility ~8' x2 to and from BR w/ CGA and RW. Toilet t/f-CGA. Pericare hygiene-Ind. Grooming-set up. Pt participated in BUE ther ex w/ 2lb HW and good tolerance. Sit>sup-Ind. Pt w/ all needs in reach upon exit. Cont OT POC.     Time Calculation:         Time Calculation- OT       Row Name 09/28/23  1355             Time Calculation- OT    OT Start Time 1355  -KD      OT Stop Time 1420  -KD      OT Time Calculation (min) 25 min  -KD      Total Timed Code Minutes- OT 25 minute(s)  -KD      OT Received On 09/28/23  -KD         Timed Charges    72283 - OT Therapeutic Exercise Minutes 20  -KD      53634 - OT Self Care/Mgmt Minutes 5  -KD         Total Minutes    Timed Charges Total Minutes 25  -KD       Total Minutes 25  -KD                User Key  (r) = Recorded By, (t) = Taken By, (c) = Cosigned By      Initials Name Provider Type    Theodora Ruiz COTA Occupational Therapist Assistant                  Therapy Charges for Today       Code Description Service Date Service Provider Modifiers Qty    12614860068 HC OT SELF CARE/MGMT/TRAIN EA 15 MIN 9/27/2023 Theodora Lewis COTA GO 4    80851476976 HC OT THER PROC EA 15 MIN 9/28/2023 Theodora Lewis COTA GO 2                 LINDSEY Powers  9/28/2023

## 2023-09-28 NOTE — PLAN OF CARE
Goal Outcome Evaluation:  Plan of Care Reviewed With: patient        Progress: improving  Outcome Evaluation: vss, c/o pain in abdomen, prn meds given, pt ambulated to restroom well with walker, pt resting between care, no suicidal ideations this shift

## 2023-09-28 NOTE — DISCHARGE PLACEMENT REQUEST
"  ATTN:  Hanna Crabtree (57 y.o. Female)       Date of Birth   1965    Social Security Number       Address   [address] [facility] [address]    Home Phone   [phone]    MRN   [id number]       Adventism   Voodoo    Marital Status   Single                            Admission Date   9/17/23    Admission Type   Emergency    Admitting Provider   Collin Linares MD    Attending Provider   Edis Valero MD    Department, Room/Bed   [address], [facility]/1       Discharge Date       Discharge Disposition       Discharge Destination                                 Attending Provider: Edis Valero MD    Allergies: No Known Allergies    Isolation: None   Infection: None   Code Status: CPR    Ht: 154.9 cm (61\")   Wt: 71.6 kg (157 lb 14.4 oz)    Admission Cmt: None   Principal Problem: Lower extremity weakness [R29.898]                   Active Insurance as of 9/17/2023       Primary Coverage       Payor Plan Insurance Group Employer/Plan Group    [facility] MEDICARE REPLACEMENT [facility] MEDICARE REPLACEMENT [id number]       Payor Plan Address Payor Plan Phone Number Payor Plan Fax Number Effective Dates    [address] [address]   1/1/2023 - None Entered    [facility] [id number]         Subscriber Name Subscriber Birth Date Member ID       HANNA GARDUNO ANN 1965 [id number]               Secondary Coverage       Payor Plan Insurance Group Employer/Plan Group    [place]        Payor Plan Address Payor Plan Phone Number Payor Plan Fax Number Effective Dates    [address] [phone]  4/7/2020 - None Entered    [facility] [id number]         Subscriber Name Subscriber Birth Date Member ID       DEREK GARDUNOI ANN 1965 [id number]                     Emergency Contacts        (Rel.) Home Phone Work Phone Mobile Phone    CaseJustina (Sister) [phone] -- --    Jewell Garduno (Relative) [phone] -- " "[phone]    BRENT ANAND (Daughter) [phone] -- [phone]    \"Best Friend\"Jana (Friend) [phone] -- [phone]    LAY BURROWS (Neighbor) [phone] -- --                 History & Physical        Collin Linares MD at 09/17/23 2240              [facility] Medicine  HISTORY AND PHYSICAL      Date of Admission: 9/17/2023  Primary Care Physician: Ninfa Bhatti APRN    Subjective     Chief Complaint: LLE weakness    History of Present Illness  Patient with past medical history of COPD, GERD, hypertension, arthritis presents with left lower extremity weakness.  Patient states that lower extremity weakness began 3 days ago.  Patient admits to history of substance abuse, and was reportedly positive for methamphetamines 3 days ago.  Patient seen in emergency room for left lower extremity weakness, and discharged home.  Patient states that left lower extremity weakness has not improved since original ED evaluation.  States pain is 10/10, focused on lower back, with cold chill like radiation to left gluteal region and then down left leg.  Patient also admits to feeling tingling sensation in left lower extremity due to leg pain.  States that leg pain is affecting ambulation.  Denies previous episodes of leg discomfort.  States that leg discomfort started after scratching her back with a \"fork\" 3 days ago.  Denies bowel/bladder dysfunction.  Patient has adequate rectal tone.  Denies that lower extremity weakness is affecting ambulation.  Also states she has not had a bowel movement since Tuesday.  CT abdomen/pelvis shows no constipation, or acute abdominal signs.    Patient has also admitted to suicidal ideation both to emergency room physician Dr. Mckeon and to admitting hospitalist Dr. Linares.  Patient denies plans for suicide, but does have suicidal thoughts acutely.        Review of Systems   Otherwise complete ROS reviewed and negative except as " "mentioned in the HPI.    Past Medical History:   Past Medical History:   Diagnosis Date    Allergic     Arthritis     COPD (chronic obstructive pulmonary disease)     GERD (gastroesophageal reflux disease)     Hypertension     Infectious viral hepatitis     Low back pain      Past Surgical History:  Past Surgical History:   Procedure Laterality Date    CHOLECYSTECTOMY      COLONOSCOPY N/A 1/21/2020    Procedure: COLONOSCOPY;  Surgeon: Joshua Perry MD;  Location: [facility] ENDOSCOPY;  Service: General    HYSTERECTOMY       Social History:  reports that she has been smoking cigarettes. She has been smoking an average of 1 pack per day. She has never used smokeless tobacco. She reports that she does not currently use alcohol. She reports current drug use. Drug: Marijuana.    Family History: family history includes Cancer in her father; Diabetes in her father; Heart disease in her mother; Hypertension in her mother.        Allergies:  No Known Allergies    Medications:  Prior to Admission medications    Medication Sig Start Date End Date Taking? Authorizing Provider   DULoxetine (CYMBALTA) 20 MG capsule Take 1 capsule by mouth Daily.   Yes Emergency, Nurse Epic, RN   fluconazole (DIFLUCAN) 200 MG tablet Take one at the first sign of infection and may repeat in 3 days as needed. 4/7/20  Yes Luisa Hayes APRN   lisinopril (PRINIVIL,ZESTRIL) 10 MG tablet Take 1 tablet by mouth 2 (Two) Times a Day.   Yes Provider, MD Lianet     I have utilized all available immediate resources to obtain, update, and review the patient's current medications.    Objective     Vital Signs: /88 (BP Location: Left arm, Patient Position: Sitting)   Pulse 99   Temp 98 °F (36.7 °C) (Temporal)   Resp 16   Ht 154.9 cm (61\")   Wt 72.6 kg (160 lb)   SpO2 97%   BMI 30.23 kg/m²   Physical Exam  Constitutional:       Appearance: Normal appearance. She is normal weight.   HENT:      Head: Normocephalic and atraumatic.      " Right Ear: Ear canal normal.      Left Ear: Ear canal normal.      Nose: Nose normal.      Mouth/Throat:      Mouth: Mucous membranes are moist.      Pharynx: Oropharynx is clear.   Eyes:      Extraocular Movements: Extraocular movements intact.      Conjunctiva/sclera: Conjunctivae normal.      Pupils: Pupils are equal, round, and reactive to light.   Cardiovascular:      Rate and Rhythm: Normal rate and regular rhythm.      Pulses: Normal pulses.      Heart sounds: Normal heart sounds.   Pulmonary:      Effort: Pulmonary effort is normal.      Breath sounds: Normal breath sounds.   Abdominal:      General: Abdomen is flat. Bowel sounds are normal.      Palpations: Abdomen is soft.   Musculoskeletal:         General: Normal range of motion.      Cervical back: Normal range of motion and neck supple.   Skin:     General: Skin is warm.      Capillary Refill: Capillary refill takes less than 2 seconds.   Neurological:      Mental Status: She is alert and oriented to person, place, and time.      Motor: Weakness present.            Results Reviewed:  Lab Results (last 24 hours)       Procedure Component Value Units Date/Time    Extra Tubes [[id number]] Collected: 09/17/23 1843    Specimen: Blood Updated: 09/17/23 2246    Narrative:      The following orders were created for panel order Extra Tubes.  Procedure                               Abnormality         Status                     ---------                               -----------         ------                     Gold Top - [facility][[id number]]                                   Final result               Gray Top[[id number]]                                         Final result               Light Blue Top[[id number]]                                   Final result                 Please view results for these tests on the individual orders.    Gray Top [[id number]] Collected: 09/17/23 1843    Specimen: Blood Updated: 09/17/23 2246     Extra Tube Hold for add-ons.      Comment: Auto resulted.       Acetaminophen Level [[id number]]  (Normal) Collected: 09/17/23 1843    Specimen: Blood Updated: 09/17/23 2117     Acetaminophen <5.0 mcg/mL     Ethanol [[id number]] Collected: 09/17/23 1843    Specimen: Blood Updated: 09/17/23 2117     Ethanol <10 mg/dL      Ethanol % <0.010 %     Salicylate Level [[id number]]  (Normal) Collected: 09/17/23 1843    Specimen: Blood Updated: 09/17/23 2117     Salicylate <0.3 mg/dL     Gold Top - SST [[id number]] Collected: 09/17/23 1843    Specimen: Blood Updated: 09/17/23 1945     Extra Tube Hold for add-ons.     Comment: Auto resulted.       Light Blue Top [[id number]] Collected: 09/17/23 1843    Specimen: Blood Updated: 09/17/23 1945     Extra Tube Hold for add-ons.     Comment: Auto resulted       Fentanyl, Urine - Urine, Clean Catch [[id number]]  (Normal) Collected: 09/17/23 1847    Specimen: Urine, Clean Catch Updated: 09/17/23 1916     Fentanyl, Urine Negative    Narrative:      Negative Threshold:      Fentanyl 5 ng/mL     The normal value for the drug tested is negative. This report includes final unconfirmed screening results to be used for medical treatment purposes only. Unconfirmed results must not be used for non-medical purposes such as employment or legal testing. Clinical consideration should be applied to any drug of abuse test, particularly when unconfirmed results are used.           Magnesium [[id number]]  (Normal) Collected: 09/17/23 1843    Specimen: Blood Updated: 09/17/23 1912     Magnesium 2.0 mg/dL     Comprehensive Metabolic Panel [[id number]]  (Abnormal) Collected: 09/17/23 1843    Specimen: Blood Updated: 09/17/23 1912     Glucose 108 mg/dL      BUN 22 mg/dL      Creatinine 1.05 mg/dL      Sodium 139 mmol/L      Potassium 4.2 mmol/L      Chloride 103 mmol/L      CO2 26.0 mmol/L      Calcium 9.0 mg/dL      Total Protein 7.0 g/dL      Albumin 4.0 g/dL      ALT (SGPT) 41 U/L      AST (SGOT) 37 U/L      Alkaline Phosphatase 129 U/L       Total Bilirubin 0.5 mg/dL      Globulin 3.0 gm/dL      A/G Ratio 1.3 g/dL      BUN/Creatinine Ratio 21.0     Anion Gap 10.0 mmol/L      eGFR 61.3 mL/min/1.73     Narrative:      GFR Normal >60  Chronic Kidney Disease <60  Kidney Failure <15      CK [[id number]]  (Normal) Collected: 09/17/23 1843    Specimen: Blood Updated: 09/17/23 1912     Creatine Kinase 56 U/L     Urine Drug Screen - Urine, Clean Catch [[id number]]  (Abnormal) Collected: 09/17/23 1847    Specimen: Urine, Clean Catch Updated: 09/17/23 1909     THC, Screen, Urine Positive     Phencyclidine (PCP), Urine Negative     Cocaine Screen, Urine Negative     Methamphetamine, Ur Negative     Opiate Screen Negative     Amphetamine Screen, Urine Negative     Benzodiazepine Screen, Urine Negative     Tricyclic Antidepressants Screen Negative     Methadone Screen, Urine Negative     Barbiturates Screen, Urine Negative     Oxycodone Screen, Urine Negative     Propoxyphene Screen Negative     Buprenorphine, Screen, Urine Negative    Narrative:      Cutoff For Drugs Screened:    Amphetamines               500 ng/ml  Barbiturates               200 ng/ml  Benzodiazepines            150 ng/ml  Cocaine                    150 ng/ml  Methadone                  200 ng/ml  Opiates                    100 ng/ml  Phencyclidine               25 ng/ml  THC                            50 ng/ml  Methamphetamine            500 ng/ml  Tricyclic Antidepressants  300 ng/ml  Oxycodone                  100 ng/ml  Propoxyphene               300 ng/ml  Buprenorphine               10 ng/ml    The normal value for all drugs tested is negative. This report includes unconfirmed screening results, with the cutoff values listed, to be used for medical treatment purposes only.  Unconfirmed results must not be used for non-medical purposes such as employment or legal testing.  Clinical consideration should be applied to any drug of abuse test, particularly when unconfirmed results are used.       Urinalysis, Microscopic Only - Urine, Clean Catch [[id number]]  (Abnormal) Collected: 09/17/23 1847    Specimen: Urine, Clean Catch Updated: 09/17/23 1859     RBC, UA 0-2 /HPF      WBC, UA 0-2 /HPF      Comment: Urine culture not indicated.        Bacteria, UA None Seen /HPF      Squamous Epithelial Cells, UA 0-2 /HPF      Hyaline Casts, UA None Seen /LPF      Methodology Automated Microscopy    Urinalysis With Culture If Indicated - Urine, Clean Catch [[id number]]  (Abnormal) Collected: 09/17/23 1847    Specimen: Urine, Clean Catch Updated: 09/17/23 1858     Color, UA Yellow     Appearance, UA Clear     pH, UA 5.5     Specific Gravity, UA 1.020     Glucose, UA Negative     Ketones, UA Negative     Bilirubin, UA Negative     Blood, UA Negative     Protein, UA Negative     Leuk Esterase, UA Trace     Nitrite, UA Negative     Urobilinogen, UA 1.0 E.U./dL    Narrative:      In absence of clinical symptoms, the presence of pyuria, bacteria, and/or nitrites on the urinalysis result does not correlate with infection.    CBC & Differential [[id number]]  (Abnormal) Collected: 09/17/23 1843    Specimen: Blood Updated: 09/17/23 1855    Narrative:      The following orders were created for panel order CBC & Differential.  Procedure                               Abnormality         Status                     ---------                               -----------         ------                     CBC Auto Differential[[id number]]        Abnormal            Final result                 Please view results for these tests on the individual orders.    CBC Auto Differential [[id number]]  (Abnormal) Collected: 09/17/23 1843    Specimen: Blood Updated: 09/17/23 1855     WBC 8.17 10*3/mm3      RBC 5.42 10*6/mm3      Hemoglobin 16.4 g/dL      Hematocrit 47.4 %      MCV 87.5 fL      MCH 30.3 pg      MCHC 34.6 g/dL      RDW 12.3 %      RDW-SD 39.1 fl      MPV 9.8 fL      Platelets 236 10*3/mm3      Neutrophil % 49.2 %      Lymphocyte % 37.9 %       Monocyte % 8.7 %      Eosinophil % 2.9 %      Basophil % 1.1 %      Immature Grans % 0.2 %      Neutrophils, Absolute 4.01 10*3/mm3      Lymphocytes, Absolute 3.10 10*3/mm3      Monocytes, Absolute 0.71 10*3/mm3      Eosinophils, Absolute 0.24 10*3/mm3      Basophils, Absolute 0.09 10*3/mm3      Immature Grans, Absolute 0.02 10*3/mm3      nRBC 0.0 /100 WBC           Imaging Results (Last 24 Hours)       Procedure Component Value Units Date/Time    CT Abdomen Pelvis Without Contrast [[id number]] Collected: 09/17/23 2057     Updated: 09/17/23 2102    Narrative:      CT ABDOMEN PELVIS WO CONTRAST    HISTORY: Abdominal pain, acute, nonlocalized    COMPARISON: CT abdomen pelvis 9/13/2023    Automated exposure control was also utilized to decrease patient radiation dose.    TECHNIQUE: Images of the abdomen and pelvis were obtained.  No IV contrast was  ministered.  Multiplanar reformatted images were provided for review.      FINDINGS:      Mild subsegmental atelectasis in the middle lobe.    Visualized mediastinal structures are normal.    Osseous structures are age-appropriate.    Liver is normal.  Spleen is unremarkable.  Bilateral kidneys are within normal limits.  No renal calculus or obstructive  uropathy.  Bilateral adrenal glands are normal.  Pancreas is normal.  Cholecystectomy.    No bowel obstruction.    The rectum is normal.  Bladder is normal.    No free air or free fluid.    No lymphadenopathy.          Impression:      1.  No acute inflammatory process throughout the abdomen or pelvis.        CT Head Without Contrast [[id number]] Collected: 09/17/23 1859     Updated: 09/17/23 1902    Narrative:      HEAD CT WITHOUT IV CONTRAST    HISTORY:  Unable to feel legs.    COMPARISON:  None.    TECHNIQUE:  Routine helical imaging through the brain with axial, coronal and sagittal  two-dimensional reformatted images.    FINDINGS:  There is no evidence of acute intracranial hemorrhage, mass effect or midline  shift.  No abnormal extra-axial fluid collection. No areas of abnormal  attenuation within the brain parenchyma to suggest an acute infarction. The  ventricles, cisterns, and sulci are within normal limits in size and  configuration. The visualized skull base structures and paranasal sinuses are  unremarkable. No calvarial fracture or other lesion.      Impression:      No acute intracranial process.          I have personally reviewed and interpreted the radiology studies and ECG obtained at time of admission.     Scheduled Meds:enoxaparin, 40 mg, Subcutaneous, Daily  nicotine, 1 patch, Transdermal, Q24H  senna-docusate sodium, 2 tablet, Oral, BID  sodium chloride, 10 mL, Intravenous, Q12H      Continuous Infusions:lactated ringers, 100 mL/hr      PRN Meds:.  acetaminophen **OR** acetaminophen **OR** acetaminophen    senna-docusate sodium **AND** polyethylene glycol **AND** bisacodyl **AND** bisacodyl    calcium carbonate    Calcium Replacement - Follow Nurse / BPA Driven Protocol    cyclobenzaprine    Magnesium Low Dose Replacement - Follow Nurse / BPA Driven Protocol    melatonin    Morphine **AND** naloxone    Morphine **AND** naloxone    nitroglycerin    ondansetron **OR** ondansetron    Phosphorus Replacement - Follow Nurse / BPA Driven Protocol    Potassium Replacement - Follow Nurse / BPA Driven Protocol    sodium chloride    sodium chloride    SUMAtriptan   Assessment / Plan     Assessment:   Active Hospital Problems    Diagnosis     **Lower extremity weakness      Plan:   Left lower extremity weakness x3 days:  - Given severity of symptoms, need to rule out spinal nerve root compression and/or CVA.  We will therefore order MRI brain, and MRI lumbar spine.  PT OT evaluation during hospitalization.  As needed pain meds.  Start Flexeril 3 times daily.    Suicidal ideation:  - Patient admitted to both Dr. Mckeon emergency room physician and admitting hospitalist Dr. Linares that she suffers from suicidal ideation.  We  will therefore place patient on one-to-one sitter, and order psych consultation in a.m.    Polysubstance abuse:  - Patient positive for methamphetamines 9/13/2023.  Patient's urine drug screen negative for methamphetamines 9/17.  Recommend patient stop using recreational drugs.  Polysubstance abuse anticipatory guidance given by Dr. Linares at time of admission.    Hypertension: Continue home medications    Medical Decision Making  Number and Complexity of problems: See above   Differential Diagnosis: See above    Conditions and Status:        Condition is improving.    I have utilized all available immediate resources to obtain, update, or review the patient's current medications (including all prescriptions, over-the-counter products, herbals, cannabis/cannabidiol products, and vitamin/mineral/dietary (nutritional) supplements).      MDM Data  External documents reviewed: Up-to-date, care everywhere  My EKG interpretation:    My CT interpretation: No  acute findings  Tests considered but not ordered:       Decision rules/scores evaluated (example [id number], [place], etc):       Discussed with: Patient, nursing staff     Treatment Plan  The above    Care Planning  Shared decision making: Patient, nursing staff, Dr. Linares  Code status and discussions: Full    Disposition  Social Determinants of Health that impact treatment or disposition: None  I expect the patient to be discharged to home or inpatient psychiatry in 2-4 days.     The patient was seen and examined by me on 9/17/2023 at home 2040.    Electronically signed by Collin Linares MD, 09/18/23, 02:35 CDT.               Electronically signed by Collin Linares MD at 09/19/23 0105          Physician Progress Notes (last 24 hours)        Amrit Briggs MD at 09/27/23 1239              [facility] Medicine Services  INPATIENT PROGRESS NOTE    Length of Stay: 3  Date of Admission: 9/17/2023  Primary Care Physician:  Ninfa Bhatti APRN    Subjective   Chief Complaint: Left lower extremity weakness  HPI: Patient states she continues to improve.  States therapy is going well.    As of today, 09/27/23  Review of Systems   Constitutional:  Negative for appetite change, chills, fatigue, fever and unexpected weight change.   Respiratory:  Negative for cough, choking, chest tightness, shortness of breath and wheezing.    Cardiovascular:  Negative for chest pain, palpitations and leg swelling.   Gastrointestinal:  Negative for abdominal pain, blood in stool, constipation, diarrhea, nausea and vomiting.   Genitourinary:  Negative for dysuria, flank pain and hematuria.   Neurological:  Negative for dizziness, seizures, syncope, speech difficulty, weakness, light-headedness, numbness and headaches.   Hematological:  Does not bruise/bleed easily.      All pertinent negatives and positives are as above. All other systems have been reviewed and are negative unless otherwise stated.    Objective    Temp:  [96.6 °F (35.9 °C)-98.2 °F (36.8 °C)] 97.8 °F (36.6 °C)  Heart Rate:  [62-75] 67  Resp:  [18] 18  BP: ()/(56-85) 107/59    AM-PAC 6 Clicks Score (PT): 21 (09/26/23 1658)    As of today, 09/27/23  Physical Exam  Vitals reviewed.   Constitutional:       Appearance: She is well-developed.   HENT:      Head: Normocephalic and atraumatic.   Eyes:      Pupils: Pupils are equal, round, and reactive to light.   Cardiovascular:      Rate and Rhythm: Normal rate and regular rhythm.      Heart sounds: Normal heart sounds. No murmur heard.    No friction rub. No gallop.   Pulmonary:      Effort: Pulmonary effort is normal. No respiratory distress.      Breath sounds: Normal breath sounds. No wheezing or rales.   Chest:      Chest wall: No tenderness.   Abdominal:      General: Bowel sounds are normal. There is no distension.      Palpations: Abdomen is soft.      Tenderness: There is no abdominal tenderness. There is no guarding.    Musculoskeletal:      Cervical back: Normal range of motion and neck supple.   Skin:     General: Skin is warm and dry.   Psychiatric:         Behavior: Behavior normal.         Thought Content: Thought content normal.         Results Review:  I have reviewed the labs, radiology results, and diagnostic studies.    Laboratory Data:   Results from last 7 days   Lab Units 09/21/23  0620   SODIUM mmol/L 137   POTASSIUM mmol/L 4.5   CHLORIDE mmol/L 101   CO2 mmol/L 28.0   BUN mg/dL 25*   CREATININE mg/dL 0.98   GLUCOSE mg/dL 101*   CALCIUM mg/dL 9.3   ANION GAP mmol/L 8.0       Estimated Creatinine Clearance: 55.7 mL/min (by C-G formula based on SCr of 0.98 mg/dL).            Results from last 7 days   Lab Units 09/21/23  0620   WBC 10*3/mm3 6.02   HEMOGLOBIN g/dL 14.6   HEMATOCRIT % 43.8   PLATELETS 10*3/mm3 201             Culture Data:   No results found for: BLOODCX  No results found for: URINECX  No results found for: RESPCX  No results found for: WOUNDCX  No results found for: STOOLCX  No components found for: BODYFLD    Radiology Data:   Imaging Results (Last 24 Hours)       ** No results found for the last 24 hours. **            I have utilized all available immediate resources to obtain, update, or review the patient's current medications (including all prescriptions, over-the-counter products, herbals, cannabis/cannabidiol products, and vitamin/mineral/dietary (nutritional) supplements).       Assessment/Plan     Active Hospital Problems    Diagnosis     **Lower extremity weakness     Depression     Adjustment disorder with depressed mood     Cannabis abuse     Positive urine drug screen for methamphetamine        Plan:  1.  Left lower extremity weakness: Work-up negative so far.  Continue therapy.  Placement pending.  2.  Suicidal ideation: Resolved.  Cleared by psych.  3.  History of polysubstance abuse: Counseled.  4.  Hypertension: Continue current treatment.  5.  DVT prophylaxis: Lovenox.      Medical  "Decision Making  Number and Complexity of problems: 2 highly complex and two moderately complex medical problems    Conditions and Status:        Condition is improving.     Discussed with: Patient and nursing     Treatment Plan  As above    Care Planning  Shared decision making: Patient in agreement plan of care  Code status and discussions: Full code    Disposition  Social Determinants of Health that impact treatment or disposition: None  I expect the patient to be discharged to rehab when accepted.      The patient was evaluated during the global COVID-19 pandemic, and the diagnosis was suspected/considered upon their initial presentation.  Evaluation, treatment, and testing were consistent with current guidelines for patients who present with complaints or symptoms that may be related to COVID-19.    I confirmed that the patient's Advance Care Plan is present, code status is documented, or surrogate decision maker is listed in the patient's medical record.        This document has been electronically signed by Amrit Briggs MD on September 27, 2023 12:39 CDT        Electronically signed by Amrit Briggs MD at 09/27/23 1240          Physical Therapy Notes (last 48 hours)        Nellie Arita PTA at 09/26/23 6503  Version 1 of 1         Goal Outcome Evaluation:  Plan of Care Reviewed With: patient        Progress: improving  Outcome Evaluation: pt is resting in bed at entry. agreeable to PT treatment. she is modified independent with scooting, rolling left, supine to sit and sit to supine in bed. she is SBA for sit to stand with FWW, CGA with fww for bed to chair transfer and CGA for ambulation with FWW. patient has difficulty with placement of L LE when walking and tends to \"hunt\" for where to place it. she needs cues to not take as large of a step on the left and to take a larger step on the right to pass the L LE. cues for heel strike bilaterally. post treatment all needs in reach and " met.      Anticipated Discharge Disposition (PT): home with assist, home with home health, inpatient rehabilitation facility, skilled nursing facility (rehab to home)    Electronically signed by Nellie Arita PTA at 23       Nellie Arita PTA at 23  Version 1 of 1         Patient Name: Hanna Escalante  : 1965    MRN: [id number]                              Today's Date: 2023       Physical Therapy Treatment Note    Admit Date: 2023    Visit Dx:     ICD-10-CM ICD-9-CM   1. Weakness of left lower extremity  R29.898 729.89   2. Impaired functional mobility, balance, gait, and endurance [Z74.09 (ICD-10-CM)]  Z74.09 V49.89   3. Impaired mobility and ADLs [Z74.09, Z78.9 (ICD-10-CM)]  Z74.09 V49.89    Z78.9      Patient Active Problem List   Diagnosis    Screening for malignant neoplasm of colon    Neck pain    Tobacco dependence    Insomnia    Back pain    Lower extremity weakness    Depression    Adjustment disorder with depressed mood    Cannabis abuse    Positive urine drug screen for methamphetamine     Past Medical History:   Diagnosis Date    Allergic     Arthritis     COPD (chronic obstructive pulmonary disease)     GERD (gastroesophageal reflux disease)     Hypertension     Infectious viral hepatitis     Low back pain      Past Surgical History:   Procedure Laterality Date    CHOLECYSTECTOMY      COLONOSCOPY N/A 2020    Procedure: COLONOSCOPY;  Surgeon: Joshua Perry MD;  Location: [facility] ENDOSCOPY;  Service: General    HYSTERECTOMY        General Information       Row Name 23 1634          Physical Therapy Time and Intention    Document Type therapy note (daily note)  -MH     Mode of Treatment individual therapy;physical therapy  -       Row Name 23 1635          General Information    Patient Profile Reviewed yes  -MH     Existing Precautions/Restrictions fall  -       Row Name 23 9400          Cognition    Orientation Status  (Cognition) oriented x 4  -       Row Name 09/26/23 1634          Safety Issues, Functional Mobility    Impairments Affecting Function (Mobility) endurance/activity tolerance;coordination;balance;pain  -               User Key  (r) = Recorded By, (t) = Taken By, (c) = Cosigned By      Initials Name Provider Type    Nellie Boykin PTA Physical Therapist Assistant                   Mobility       Row Name 09/26/23 1635          Bed Mobility    Bed Mobility supine-sit;sit-supine  -     Rolling Left [place] (Bed Mobility) modified independence  -     Scooting/Bridging [place] (Bed Mobility) modified independence  -     Supine-Sit [place] (Bed Mobility) modified independence  -     Sit-Supine [place] (Bed Mobility) modified independence  -     Assistive Device (Bed Mobility) head of bed elevated;bed rails  -       Row Name 09/26/23 1635          Bed-Chair Transfer    Bed-Chair [place] (Transfers) contact guard  -     Assistive Device (Bed-Chair Transfers) walker, front-wheeled  -       Row Name 09/26/23 1635          Sit-Stand Transfer    Sit-Stand [place] (Transfers) standby assist  -     Assistive Device (Sit-Stand Transfers) walker, front-wheeled  -       Row Name 09/26/23 1635          Gait/Stairs (Locomotion)    [place] Level (Gait) contact guard  -     Assistive Device (Gait) walker, front-wheeled  -     Distance in Feet (Gait) 6 feet + 68 feet  -     Deviations/Abnormal Patterns (Gait) stride length decreased;weight shifting decreased;left sided deviations;marley decreased  -     Bilateral Gait Deviations forward flexed posture  -               User Key  (r) = Recorded By, (t) = Taken By, (c) = Cosigned By      Initials Name Provider Type    Nellie Boykin PTA Physical Therapist Assistant                   Obj/Interventions    No documentation.                  Goals/Plan       Row Name 09/26/23 1658          Bed Mobility Goal 1 (PT)     Activity/Assistive Device (Bed Mobility Goal 1, PT) sit to supine/supine to sit  -     [place] Level/Cues Needed (Bed Mobility Goal 1, PT) independent  -MH     Time Frame (Bed Mobility Goal 1, PT) by discharge  -MH     Progress/Outcomes (Bed Mobility Goal 1, PT) goal not met;continuing progress toward goal  -MH       Row Name 09/26/23 1658          Transfer Goal 1 (PT)    Activity/Assistive Device (Transfer Goal 1, PT) sit-to-stand/stand-to-sit;bed-to-chair/chair-to-bed;walker, rolling  -MH     [place] Level/Cues Needed (Transfer Goal 1, PT) modified independence  -MH     Strategies/Barriers (Transfers Goal 1, PT) Strong LEs, decreased sensation, ataxic gait.  -MH     Progress/Outcome (Transfer Goal 1, PT) goal not met;continuing progress toward goal  -       Row Name 09/26/23 1658          Gait Training Goal 1 (PT)    Activity/Assistive Device (Gait Training Goal 1, PT) walker, rolling  -MH     [place] Level (Gait Training Goal 1, PT) modified independence  -MH     Distance (Gait Training Goal 1, PT) 50'x2.  -MH     Time Frame (Gait Training Goal 1, PT) by discharge  -MH     Strategies/Barriers (Gait Training Goal 1, PT) Strong LEs, decreased sensation, ataxic gait.  -MH     Progress/Outcome (Gait Training Goal 1, PT) goal not met;continuing progress toward goal  -       Row Name 09/26/23 1658          Stairs Goal 1 (PT)    Activity/Assistive Device (Stairs Goal 1, PT) ascending stairs;descending stairs  -     [place] Level/Cues Needed (Stairs Goal 1, PT) modified independence  -MH     Number of Stairs (Stairs Goal 1, PT) 1 step, may use FWW.  -MH     Time Frame (Stairs Goal 1, PT) by discharge  -MH     Strategies/Barriers (Stairs Goal 1, PT) Strong LEs, decreased sensation, ataxic gait.  -     Progress/Outcome (Stairs Goal 1, PT) goal not met  -               User Key  (r) = Recorded By, (t) = Taken By, (c) = Cosigned By      Initials Name Provider Type     Nellie Arita, PTA  "Physical Therapist Assistant                   Clinical Impression       Row Name 09/26/23 1636          Pain    Pretreatment Pain Rating 6/10  -     Posttreatment Pain Rating 6/10  -     Pain Location - Side/Orientation Left  -     Pain Location - other (see comments)  left side  -     Pain Intervention(s) Repositioned;Ambulation/increased activity  -       Row Name 09/26/23 1636          Plan of Care Review    Plan of Care Reviewed With patient  -     Progress improving  -     Outcome Evaluation pt is resting in bed at entry. agreeable to PT treatment. she is modified independent with scooting, rolling left, supine to sit and sit to supine in bed. she is SBA for sit to stand with FWW, CGA with fww for bed to chair transfer and CGA for ambulation with FWW. patient has difficulty with placement of L LE when walking and tends to \"hunt\" for where to place it. she needs cues to not take as large of a step on the left and to take a larger step on the right to pass the L LE. cues for heel strike bilaterally. post treatment all needs in reach and met.  -       Row Name 09/26/23 1636          Therapy Assessment/Plan (PT)    Criteria for Skilled Interventions Met (PT) yes;skilled treatment is necessary  -     Therapy Frequency (PT) 5 times/wk  -       Row Name 09/26/23 1636          Vital Signs    Pre Systolic BP Rehab 164  -MH     Pre Treatment Diastolic BP 75  -     Pretreatment Heart Rate (beats/min) 66  -MH     Pre SpO2 (%) 98  -     O2 Delivery Pre Treatment room air  -     Pre Patient Position Sitting  -     Post Patient Position Supine  -       Row Name 09/26/23 1636          Positioning and Restraints    Pre-Treatment Position in bed  -     Post Treatment Position bed  -MH     In Bed notified nsg;fowlers;call light within reach;encouraged to call for assist;exit alarm on  -               User Key  (r) = Recorded By, (t) = Taken By, (c) = Cosigned By      Initials Name Provider Type "    Nellie Boykin PTA Physical Therapist Assistant                   Outcome Measures       Row Name 09/26/23 1658 09/26/23 0855       How much help from another person do you currently need...    Turning from your back to your side while in flat bed without using bedrails? 4  - 4  -JH    Moving from lying on back to sitting on the side of a flat bed without bedrails? 4  - 4  -JH    Moving to and from a bed to a chair (including a wheelchair)? 3  - 3  -JH    Standing up from a chair using your arms (e.g., wheelchair, bedside chair)? 4  - 3  -JH    Climbing 3-5 steps with a railing? 3  - 3  -JH    To walk in hospital room? 3  - 3  -JH    AM-PAC 6 Clicks Score (PT) 21  - 20  -JH    Highest level of mobility 6 --> Walked 10 steps or more  - 6 --> Walked 10 steps or more  -JH              User Key  (r) = Recorded By, (t) = Taken By, (c) = Cosigned By      Initials Name Provider Type    Nellie Boykin PTA Physical Therapist Assistant     Qiana Rice LPN Licensed Nurse                                 Physical Therapy Education       Title: PT OT SLP Therapies (In Progress)       Topic: Physical Therapy (In Progress)       Point: Mobility training (In Progress)       Learning Progress Summary             Patient Acceptance, E, NR by  at 9/20/2023 1052    Comment: PT POC, hand  placenent with transfers, rehab process.                         Point: Home exercise program (Not Started)       Learner Progress:  Not documented in this visit.              Point: Body mechanics (Not Started)       Learner Progress:  Not documented in this visit.              Point: Precautions (Not Started)       Learner Progress:  Not documented in this visit.                              User Key       Initials Effective Dates Name Provider Type Discipline     07/11/23 -  Raul Leung, PT Physical Therapist PT                  PT Recommendation and Plan     Plan of Care Reviewed With: patient  Progress:  "improving  Outcome Evaluation: pt is resting in bed at entry. agreeable to PT treatment. she is modified independent with scooting, rolling left, supine to sit and sit to supine in bed. she is SBA for sit to stand with FWW, CGA with fww for bed to chair transfer and CGA for ambulation with FWW. patient has difficulty with placement of L LE when walking and tends to \"hunt\" for where to place it. she needs cues to not take as large of a step on the left and to take a larger step on the right to pass the L LE. cues for heel strike bilaterally. post treatment all needs in reach and met.     Time Calculation:         PT Charges       Row Name 09/26/23 1635             Time Calculation    Start Time 1630  -      Stop Time 1655  -      Time Calculation (min) 25 min  -      PT Received On 09/26/23  -         Time Calculation- PT    Total Timed Code Minutes- PT 25 minute(s)  -         Timed Charges    50245 - PT Therapeutic Activity Minutes 25  -MH         Total Minutes    Timed Charges Total Minutes 25  -MH       Total Minutes 25  -MH                User Key  (r) = Recorded By, (t) = Taken By, (c) = Cosigned By      Initials Name Provider Type     Nellie Arita PTA Physical Therapist Assistant                  Therapy Charges for Today       Code Description Service Date Service Provider Modifiers Qty    01674636961  PT THERAPEUTIC ACT EA 15 MIN 9/26/2023 Nellie Arita PTA GP 2            PT G-Codes  Outcome Measure Options: AM-PAC 6 Clicks Basic Mobility (PT)  AM-PAC 6 Clicks Score (PT): 21  AM-PAC 6 Clicks Score (OT): 21  PT Discharge Summary  Anticipated Discharge Disposition (PT): home with assist, home with home health, inpatient rehabilitation facility, skilled nursing facility (rehab to home)    Nellie Arita PTA  9/26/2023      Electronically signed by Nellie Arita PTA at 09/26/23 3400       Dany Whitaker PTA at 09/27/23 1401  Version 1 of 1         Goal Outcome Evaluation:  Plan of Care " Reviewed With: patient        Progress: improving  Outcome Evaluation: Pt. was Mod (I) for bed mobility and sba for sit to stand transfers and performed several during tx.  Pt. was cga for gait and amb. 8' and then 24' x 2 with seated rest break between gait trips and L LE ataxic gait.  No new goals met and pt. mobility improving and would benefit from cotinued skilled PT.           Electronically signed by Dany Whitaker, PTA at 23 1401       Dany Whitaker PTA at 23 1401  Version 1 of 1         Acute Care - Physical Therapy Treatment Note  [facility]     Patient Name: Hanna Escalante  : 1965  MRN: [id number]  Today's Date: 2023      Visit Dx:     ICD-10-CM ICD-9-CM   1. Weakness of left lower extremity  R29.898 729.89   2. Impaired functional mobility, balance, gait, and endurance [Z74.09 (ICD-10-CM)]  Z74.09 V49.89   3. Impaired mobility and ADLs [Z74.09, Z78.9 (ICD-10-CM)]  Z74.09 V49.89    Z78.9      Patient Active Problem List   Diagnosis    Screening for malignant neoplasm of colon    Neck pain    Tobacco dependence    Insomnia    Back pain    Lower extremity weakness    Depression    Adjustment disorder with depressed mood    Cannabis abuse    Positive urine drug screen for methamphetamine     Past Medical History:   Diagnosis Date    Allergic     Arthritis     COPD (chronic obstructive pulmonary disease)     GERD (gastroesophageal reflux disease)     Hypertension     Infectious viral hepatitis     Low back pain      Past Surgical History:   Procedure Laterality Date    CHOLECYSTECTOMY      COLONOSCOPY N/A 2020    Procedure: COLONOSCOPY;  Surgeon: Joshua Perry MD;  Location: [facility] ENDOSCOPY;  Service: General    HYSTERECTOMY       PT Assessment (last 12 hours)       PT Evaluation and Treatment       Row Name 23 1329          Physical Therapy Time and Intention    Subjective Information no complaints  -CA     Document Type therapy note (daily note)  -CA      Mode of Treatment individual therapy;physical therapy  -CA       Row Name 09/27/23 1329          General Information    Existing Precautions/Restrictions fall  -CA       Row Name 09/27/23 1329          Pain    Pretreatment Pain Rating 0/10 - no pain  -CA     Posttreatment Pain Rating 0/10 - no pain  -CA       Row Name 09/27/23 1329          Cognition    Orientation Status (Cognition) oriented x 4  -CA       Row Name 09/27/23 1329          Bed Mobility    Bed Mobility rolling left;scooting/bridging;supine-sit;sit-supine  -CA     Rolling Left [place] (Bed Mobility) modified independence  -CA     Scooting/Bridging [place] (Bed Mobility) modified independence  -CA     Supine-Sit [place] (Bed Mobility) modified independence  -CA     Sit-Supine [place] (Bed Mobility) modified independence  -CA       Row Name 09/27/23 1329          Transfers    Transfers sit-stand transfer;stand-sit transfer  -CA       Row Name 09/27/23 1329          Bed-Chair Transfer    Bed-Chair [place] (Transfers) contact guard  -CA     Assistive Device (Bed-Chair Transfers) walker, front-wheeled  -CA       Row Name 09/27/23 1329          Sit-Stand Transfer    Sit-Stand [place] (Transfers) standby assist  -CA     Assistive Device (Sit-Stand Transfers) walker, front-wheeled  -CA       Row Name 09/27/23 1329          Stand-Sit Transfer    Stand-Sit [place] (Transfers) standby assist  -CA     Assistive Device (Stand-Sit Transfers) walker, front-wheeled  -CA       Row Name 09/27/23 1329          Toilet Transfer    Type (Toilet Transfer) sit-stand;stand-sit  -CA     [place] Level (Toilet Transfer) standby assist;contact guard  -CA     Assistive Device (Toilet Transfer) walker, front-wheeled  -CA       Row Name 09/27/23 1329          Gait/Stairs (Locomotion)    [place] Level (Gait) contact guard  -CA     Assistive Device (Gait) walker, front-wheeled  -CA     Distance in Feet (Gait) 24' x 2 and 8  -CA      Pattern (Gait) step-to  -CA     Deviations/Abnormal Patterns (Gait) stride length decreased  -CA     Bilateral Gait Deviations forward flexed posture  -CA     Comment, (Gait/Stairs) L LE ataxic gait  -CA       Row Name 09/27/23 1329          Vital Signs    Pre Patient Position Supine  -CA     Intra Patient Position Standing  -CA     Post Patient Position Supine  -CA       Row Name 09/27/23 1329          Positioning and Restraints    Pre-Treatment Position in bed  -CA     Post Treatment Position bed  -CA     In Bed fowlers;call light within reach;encouraged to call for assist  -CA               User Key  (r) = Recorded By, (t) = Taken By, (c) = Cosigned By      Initials Name Provider Type    CA Dany Whitaker, PTA Physical Therapist Assistant                    Physical Therapy Education       Title: PT OT SLP Therapies (In Progress)       Topic: Physical Therapy (In Progress)       Point: Mobility training (In Progress)       Learning Progress Summary             Patient Acceptance, E, NR by SARAH at 9/20/2023 1052    Comment: PT POC, hand  placenent with transfers, rehab process.                         Point: Home exercise program (Not Started)       Learner Progress:  Not documented in this visit.              Point: Body mechanics (Not Started)       Learner Progress:  Not documented in this visit.              Point: Precautions (Not Started)       Learner Progress:  Not documented in this visit.                              User Key       Initials Effective Dates Name Provider Type Discipline     07/11/23 -  Raul Leung, PT Physical Therapist PT                  PT Recommendation and Plan     Plan of Care Reviewed With: patient  Progress: improving  Outcome Evaluation: Pt. was Mod (I) for bed mobility and sba for sit to stand transfers and performed several during tx.  Pt. was cga for gait and amb. 8' and then 24' x 2 with seated rest break between gait trips and L LE ataxic gait.  No new goals met and pt.  mobility improving and would benefit from cotinued skilled PT.       Time Calculation:    PT Charges       Row Name 23 1359             Time Calculation    Start Time 1329  -CA      Stop Time 1353  -CA      Time Calculation (min) 24 min  -CA      PT Received On 23  -CA         Time Calculation- PT    Total Timed Code Minutes- PT 24 minute(s)  -CA                User Key  (r) = Recorded By, (t) = Taken By, (c) = Cosigned By      Initials Name Provider Type    Dany Rhodes PTA Physical Therapist Assistant                  Therapy Charges for Today       Code Description Service Date Service Provider Modifiers Qty    57982276052 HC GAIT TRAINING EA 15 MIN 2023 Dany Whitaker PTA GP 1    33951667353 HC PT THERAPEUTIC ACT EA 15 MIN 2023 Dany Whitaker PTA GP 1            PT G-Codes  Outcome Measure Options: AM-PAC 6 Clicks Basic Mobility (PT)  AM-PAC 6 Clicks Score (PT): 21  AM-PAC 6 Clicks Score (OT): 24    Dany Whitaker PTA  2023      Electronically signed by Dany Whitaker PTA at 23 1401          Occupational Therapy Notes (last 48 hours)        Theodora Lewis COTA at 23 1046          Patient Name: Hanna Escalante  : 1965    MRN: [id number]                              Today's Date: 2023       Admit Date: 2023    Visit Dx:     ICD-10-CM ICD-9-CM   1. Weakness of left lower extremity  R29.898 729.89   2. Impaired functional mobility, balance, gait, and endurance [Z74.09 (ICD-10-CM)]  Z74.09 V49.89   3. Impaired mobility and ADLs [Z74.09, Z78.9 (ICD-10-CM)]  Z74.09 V49.89    Z78.9      Patient Active Problem List   Diagnosis    Screening for malignant neoplasm of colon    Neck pain    Tobacco dependence    Insomnia    Back pain    Lower extremity weakness    Depression    Adjustment disorder with depressed mood    Cannabis abuse    Positive urine drug screen for methamphetamine     Past Medical History:   Diagnosis Date    Allergic     Arthritis     COPD  (chronic obstructive pulmonary disease)     GERD (gastroesophageal reflux disease)     Hypertension     Infectious viral hepatitis     Low back pain      Past Surgical History:   Procedure Laterality Date    CHOLECYSTECTOMY      COLONOSCOPY N/A 1/21/2020    Procedure: COLONOSCOPY;  Surgeon: Joshua Perry MD;  Location: [facility] ENDOSCOPY;  Service: General    HYSTERECTOMY        General Information       Row Name 09/27/23 0940          OT Time and Intention    Document Type therapy note (daily note)  -KD     Mode of Treatment individual therapy;occupational therapy  -KD       Row Name 09/27/23 0940          General Information    Patient Profile Reviewed yes  -KD     Existing Precautions/Restrictions fall  -KD       Row Name 09/27/23 0940          Cognition    Orientation Status (Cognition) oriented x 4  -KD       Row Name 09/27/23 0940          Safety Issues, Functional Mobility    Impairments Affecting Function (Mobility) endurance/activity tolerance;coordination;balance;pain  -KD               User Key  (r) = Recorded By, (t) = Taken By, (c) = Cosigned By      Initials Name Provider Type    Theodora Ruiz COTA Occupational Therapist Assistant                     Mobility/ADL's       Row Name 09/27/23 0940          Bed Mobility    Supine-Sit [place] (Bed Mobility) modified independence  -KD     Sit-Supine [place] (Bed Mobility) modified independence  -KD     Assistive Device (Bed Mobility) head of bed elevated;bed rails  -KD       Row Name 09/27/23 0940          Sit-Stand Transfer    Sit-Stand [place] (Transfers) standby assist  -KD     Assistive Device (Sit-Stand Transfers) walker, front-wheeled  -KD       Row Name 09/27/23 0940          Stand-Sit Transfer    Stand-Sit [place] (Transfers) standby assist  -KD     Assistive Device (Stand-Sit Transfers) walker, front-wheeled  -KD       Row Name 09/27/23 0940          Toilet Transfer    [place] Level (Toilet Transfer) contact  guard  -KD     Assistive Device (Toilet Transfer) walker, front-wheeled  -KD       Row Name 09/27/23 0940          Functional Mobility    Functional Mobility- Ind. Level contact guard assist  -KD     Functional Mobility- Device walker, front-wheeled  -KD     Functional Mobility-Distance (Feet) 4  x2  -KD       Row Name 09/27/23 0940          Activities of Daily Living    BADL Assessment/Intervention bathing;upper body dressing;lower body dressing;grooming;toileting  -KD       Row Name 09/27/23 0940          Lower Body Dressing Assessment/Training    [place] Level (Lower Body Dressing) lower body dressing skills;doff;don;socks;supervision  -KD     Position (Lower Body Dressing) edge of bed sitting  -KD       Row Name 09/27/23 0940          Toileting Assessment/Training    [place] Level (Toileting) toileting skills;adjust/manage clothing;perform perineal hygiene;standby assist;set up  -KD     Position (Toileting) unsupported sitting  -       Row Name 09/27/23 0940          Bathing Assessment/Intervention    [place] Level (Bathing) bathing skills;lower body;upper body;standby assist  -KD     Assistive Devices (Bathing) bath mitt;grab bar, tub/shower;long-handled sponge;shower chair  -KD     Position (Bathing) unsupported sitting  -       Row Name 09/27/23 0940          Upper Body Dressing Assessment/Training    [place] Level (Upper Body Dressing) upper body dressing skills;doff;don;standby assist  -KD     Position (Upper Body Dressing) edge of bed sitting  -       Row Name 09/27/23 0940          Grooming Assessment/Training    [place] Level (Grooming) grooming skills;hair care, combing/brushing;oral care regimen;wash face, hands;set up  -KD     Position (Grooming) edge of bed sitting  -KD               User Key  (r) = Recorded By, (t) = Taken By, (c) = Cosigned By      Initials Name Provider Type    Theodora Ruiz COTA Occupational Therapist Assistant                    Obj/Interventions    No documentation.                  Goals/Plan       Row Name 09/27/23 0940          Transfer Goal 1 (OT)    Activity/Assistive Device (Transfer Goal 1, OT) toilet  -KD     [place] Level/Cues Needed (Transfer Goal 1, OT) supervision required  -KD     Time Frame (Transfer Goal 1, OT) long term goal (LTG);by discharge  -KD     Progress/Outcome (Transfer Goal 1, OT) goal not met  -KD       Row Name 09/27/23 0940          Bathing Goal 1 (OT)    Activity/Device (Bathing Goal 1, OT) lower body bathing  -KD     [place] Level/Cues Needed (Bathing Goal 1, OT) supervision required  -KD     Time Frame (Bathing Goal 1, OT) long term goal (LTG);by discharge  -KD     Progress/Outcomes (Bathing Goal 1, OT) goal met  -KD       Row Name 09/27/23 0940          Dressing Goal 1 (OT)    Activity/Device (Dressing Goal 1, OT) lower body dressing  -KD     [place]/Cues Needed (Dressing Goal 1, OT) supervision required  -KD     Time Frame (Dressing Goal 1, OT) long term goal (LTG);by discharge  -KD     Progress/Outcome (Dressing Goal 1, OT) goal met  -KD       Row Name 09/27/23 0940          Problem Specific Goal 1 (OT)    Problem Specific Goal 1 (OT) Pt will tolerate at least 4 continuous minutes of standing ADL with SPV or less and zero LOB for increased balance required for ADLs and mobility.  -KD     Time Frame (Problem Specific Goal 1, OT) long term goal (LTG);by discharge  -KD     Progress/Outcome (Problem Specific Goal 1, OT) goal not met  -KD               User Key  (r) = Recorded By, (t) = Taken By, (c) = Cosigned By      Initials Name Provider Type    KD Theodora Lewis COTA Occupational Therapist Assistant                   Clinical Impression       Row Name 09/27/23 0940          Pain Assessment    Pretreatment Pain Rating 0/10 - no pain  -KD     Posttreatment Pain Rating 0/10 - no pain  -KD       Row Name 09/27/23 0940          Plan of Care Review    Plan of Care Reviewed With patient  -KD      Progress improving  -KD     Outcome Evaluation OT tx complete this date. Sup>sit-Mod I. Sit>stand-SBA. Fxl mobility to BR-CGA. Toilet t/f-SBA. Pericare-Ind. Shower t/f-SBA. Pt sat on shower chair andcompleted shower. UB/LB bathing-SBA w/ LH sponge. UB/LB dressing-SBA. Grooming (oral care) comb hair-set up. Pt then returned to sup-Mod I. Pt w/ all needs in reach upon exit. Cont OT POC.  -KD       Row Name 09/27/23 0940          Therapy Assessment/Plan (OT)    Therapy Frequency (OT) other (see comments)  5-7 d/wk  -KD       Row Name 09/27/23 0940          Therapy Plan Review/Discharge Plan (OT)    Anticipated Discharge Disposition (OT) other (see comments);skilled nursing facility;inpatient rehabilitation facility;home with assist;home with home health  -KD       Row Name 09/27/23 0940          Vital Signs    Pre Systolic BP Rehab 94  -KD     Pre Treatment Diastolic BP 56  -KD     Pretreatment Heart Rate (beats/min) 66  -KD     Pre SpO2 (%) 98  -KD     O2 Delivery Pre Treatment room air  -KD     Pre Patient Position Supine  -KD     Intra Patient Position Standing  -KD     Post Patient Position Supine  -KD       Row Name 09/27/23 0940          Positioning and Restraints    Pre-Treatment Position in bed  -KD     Post Treatment Position bed  -KD     In Bed fowlers;call light within reach;encouraged to call for assist;exit alarm on  -KD               User Key  (r) = Recorded By, (t) = Taken By, (c) = Cosigned By      Initials Name Provider Type    KD Theodora Lewis COTA Occupational Therapist Assistant                   Outcome Measures       Row Name 09/27/23 0940          How much help from another is currently needed...    Putting on and taking off regular lower body clothing? 4  -KD     Bathing (including washing, rinsing, and drying) 4  -KD     Toileting (which includes using toilet bed pan or urinal) 4  -KD     Putting on and taking off regular upper body clothing 4  -KD     Taking care of personal grooming (such  as brushing teeth) 4  -KD     Eating meals 4  -KD     AM-PAC 6 Clicks Score (OT) 24  -KD               User Key  (r) = Recorded By, (t) = Taken By, (c) = Cosigned By      Initials Name Provider Type    Theodora Ruiz COTA Occupational Therapist Assistant                    Occupational Therapy Education       Title: PT OT SLP Therapies (In Progress)       Topic: Occupational Therapy (In Progress)       Point: ADL training (Done)       Description:   Instruct learner(s) on proper safety adaptation and remediation techniques during self care or transfers.   Instruct in proper use of assistive devices.                  Learning Progress Summary             Patient Acceptance, E,TB, VU by  at 9/20/2023 8455    Comment: OT POC, Role of OT, d/c recommendations                         Point: Home exercise program (Not Started)       Description:   Instruct learner(s) on appropriate technique for monitoring, assisting and/or progressing therapeutic exercises/activities.                  Learner Progress:  Not documented in this visit.              Point: Precautions (Not Started)       Description:   Instruct learner(s) on prescribed precautions during self-care and functional transfers.                  Learner Progress:  Not documented in this visit.              Point: Body mechanics (Not Started)       Description:   Instruct learner(s) on proper positioning and spine alignment during self-care, functional mobility activities and/or exercises.                  Learner Progress:  Not documented in this visit.                              User Key       Initials Effective Dates Name Provider Type Discipline     11/18/22 -  Perlita Rich OT Occupational Therapist OT                  OT Recommendation and Plan  Therapy Frequency (OT): other (see comments) (5-7 d/wk)  Plan of Care Review  Plan of Care Reviewed With: patient  Progress: improving  Outcome Evaluation: OT tx complete this date. Sup>sit-Mod I.  Sit>stand-SBA. Fxl mobility to BR-CGA. Toilet t/f-SBA. Pericare-Ind. Shower t/f-SBA. Pt sat on shower chair andcompleted shower. UB/LB bathing-SBA w/ LH sponge. UB/LB dressing-SBA. Grooming (oral care) comb hair-set up. Pt then returned to sup-Mod I. Pt w/ all needs in reach upon exit. Cont OT POC.     Time Calculation:         Time Calculation- OT       Row Name 09/27/23 0940             Time Calculation- OT    OT Start Time 0940  -KD      OT Stop Time 1033  -KD      OT Time Calculation (min) 53 min  -KD      Total Timed Code Minutes- OT 53 minute(s)  -KD      OT Received On 09/27/23  -KD         Timed Charges    13040 - OT Self Care/Mgmt Minutes 53  -KD         Total Minutes    Timed Charges Total Minutes 53  -KD       Total Minutes 53  -KD                User Key  (r) = Recorded By, (t) = Taken By, (c) = Cosigned By      Initials Name Provider Type     Theodora Lewis COTA Occupational Therapist Assistant                  Therapy Charges for Today       Code Description Service Date Service Provider Modifiers Qty    12980067133 HC OT SELF CARE/MGMT/TRAIN EA 15 MIN 9/27/2023 Theodora Lewis COTA GO 4                 LINDSEY Powers  9/27/2023    Electronically signed by Theodora Lewis COTA at 09/27/23 1046       Theodora Lewis COTA at 09/27/23 1045             Goal Outcome Evaluation:  Plan of Care Reviewed With: patient        Progress: improving  Outcome Evaluation: OT tx complete this date. Sup>sit-Mod I. Sit>stand-SBA. Fxl mobility to BR-CGA. Toilet t/f-SBA. [facility]icare-Ind. Shower t/f-SBA. Pt sat on shower chair andcompleted shower. UB/LB bathing-SBA w/ LH sponge. UB/LB dressing-SBA. Grooming (oral care) comb hair-set up. Pt then returned to sup-Mod I. Pt w/ all needs in reach upon exit. Cont OT POC.      Anticipated Discharge Disposition (OT): other (see comments), skilled nursing facility, inpatient rehabilitation facility, home with assist, home with home health    Electronically signed by  Theodora Lewis, PORTILLO at 23 1045       Issa Ventura, PORTILLO at 23 1711          Patient Name: Hanna Escalante  : 1965    MRN: [id number]                              Today's Date: 2023       Admit Date: 2023    Visit Dx:     ICD-10-CM ICD-9-CM   1. Weakness of left lower extremity  R29.898 729.89   2. Impaired functional mobility, balance, gait, and endurance [Z74.09 (ICD-10-CM)]  Z74.09 V49.89   3. Impaired mobility and ADLs [Z74.09, Z78.9 (ICD-10-CM)]  Z74.09 V49.89    Z78.9      Patient Active Problem List   Diagnosis    Screening for malignant neoplasm of colon    Neck pain    Tobacco dependence    Insomnia    Back pain    Lower extremity weakness    Depression    Adjustment disorder with depressed mood    Cannabis abuse    Positive urine drug screen for methamphetamine     Past Medical History:   Diagnosis Date    Allergic     Arthritis     COPD (chronic obstructive pulmonary disease)     GERD (gastroesophageal reflux disease)     Hypertension     Infectious viral hepatitis     Low back pain      Past Surgical History:   Procedure Laterality Date    CHOLECYSTECTOMY      COLONOSCOPY N/A 2020    Procedure: COLONOSCOPY;  Surgeon: Joshua Perry MD;  Location: [facility] ENDOSCOPY;  Service: General    HYSTERECTOMY        General Information       Row Name 23 1052          OT Time and Intention    Document Type therapy note (daily note)  -TO     Mode of Treatment individual therapy;occupational therapy  -TO       Row Name 23 1052          General Information    Patient Profile Reviewed yes  -TO     Existing Precautions/Restrictions fall  -TO       Row Name 23 1052          Cognition    Orientation Status (Cognition) oriented x 4  -TO       Row Name 23 1052          Safety Issues, Functional Mobility    Impairments Affecting Function (Mobility) endurance/activity tolerance;coordination;balance;pain  -TO               User Key  (r) = Recorded By, (t)  = Taken By, (c) = Cosigned By      Initials Name Provider Type    TO Issa Ventura COTA Occupational Therapist Assistant                     Mobility/ADL's       Row Name 09/26/23 1052          Bed Mobility    Bed Mobility supine-sit;sit-supine  -TO     All Activities, [place] (Bed Mobility) contact guard  -TO     Rolling Left [place] (Bed Mobility) modified independence  -TO     Rolling Right [place] (Bed Mobility) modified independence  -TO     Scooting/Bridging [place] (Bed Mobility) modified independence  -TO     Supine-Sit [place] (Bed Mobility) modified independence  -TO     Sit-Supine [place] (Bed Mobility) modified independence  -TO     Assistive Device (Bed Mobility) head of bed elevated;bed rails  -TO       [facility] Name 09/26/23 1052          Transfers    Transfers sit-stand transfer;stand-sit transfer;toilet transfer  -TO       [facility] Name 09/26/23 1052          Sit-Stand Transfer    Sit-Stand [place] (Transfers) standby assist  -TO     Assistive Device (Sit-Stand Transfers) walker, front-wheeled  -TO       [facility] Name 09/26/23 1052          Stand-Sit Transfer    Stand-Sit [place] (Transfers) standby assist  -TO     Assistive Device (Stand-Sit Transfers) walker, front-wheeled  -TO       [facility] Name 09/26/23 1052          Toilet Transfer    Type (Toilet Transfer) sit-stand;stand-sit  -TO     [place] Level (Toilet Transfer) contact guard  -TO     Assistive Device (Toilet Transfer) walker, front-wheeled  -TO       Row Name 09/26/23 1052          Functional Mobility    Functional Mobility- Ind. Level contact guard assist  -TO     Functional Mobility- Device walker, front-wheeled  -TO     Functional Mobility-Distance (Feet) 10  -TO     Functional Mobility- Comment ataxia with LLE  -TO       Row Name 09/26/23 1052          Activities of Daily Living    BADL Assessment/Intervention toileting  -TO       [facility] Name 09/26/23 1052          Lower Body Dressing Assessment/Training     [place] Level (Lower Body Dressing) lower body dressing skills;doff;don;socks;set up  -TO     Position (Lower Body Dressing) edge of bed sitting  -TO       [facility] Name 09/26/23 1052          Toileting Assessment/Training    [place] Level (Toileting) toileting skills;adjust/manage clothing;perform perineal hygiene;contact guard assist  -TO     Position (Toileting) supported standing  -TO               User Key  (r) = Recorded By, (t) = Taken By, (c) = Cosigned By      Initials Name Provider Type    TO Issa Ventura COTA Occupational Therapist Assistant                   Obj/Interventions       Row Name 09/26/23 1052          Range of Motion Comprehensive    General Range of Motion bilateral lower extremity ROM WFL  -TO       Row Name 09/26/23 1052          Shoulder (Therapeutic Exercise)    Shoulder (Therapeutic Exercise) strengthening exercise  -TO     Shoulder Strengthening (Therapeutic Exercise) bilateral;aBduction;aDduction;horizontal aBduction/aDduction;resisted diagonal exercise;scapular stabilization;yellow;20 repititions;sitting;2 sets  -TO       Row Name 09/26/23 1052          Elbow/Forearm (Therapeutic Exercise)    Elbow/Forearm (Therapeutic Exercise) strengthening exercise  -TO     Elbow/Forearm AROM (Therapeutic Exercise) bilateral;flexion;extension;2 sets;20 repititions  -TO               User Key  (r) = Recorded By, (t) = Taken By, (c) = Cosigned By      Initials Name Provider Type    TO Issa Ventura COTA Occupational Therapist Assistant                   Goals/Plan       Row Name 09/26/23 1052          Transfer Goal 1 (OT)    Activity/Assistive Device (Transfer Goal 1, OT) toilet  -TO     [place] Level/Cues Needed (Transfer Goal 1, OT) supervision required  -TO     Time Frame (Transfer Goal 1, OT) long term goal (LTG);by discharge  -TO     Progress/Outcome (Transfer Goal 1, OT) goal not met  -TO       Row Name 09/26/23 1052          Bathing Goal 1 (OT)    Activity/Device (Bathing  Goal 1, OT) lower body bathing  -TO     [place] Level/Cues Needed (Bathing Goal 1, OT) supervision required  -TO     Time Frame (Bathing Goal 1, OT) long term goal (LTG);by discharge  -TO     Progress/Outcomes (Bathing Goal 1, OT) goal not met  -TO       Row Name 09/26/23 1052          Dressing Goal 1 (OT)    Activity/Device (Dressing Goal 1, OT) lower body dressing  -TO     [place]/Cues Needed (Dressing Goal 1, OT) supervision required  -TO     Time Frame (Dressing Goal 1, OT) long term goal (LTG);by discharge  -TO     Progress/Outcome (Dressing Goal 1, OT) goal not met  -TO       Row Name 09/26/23 1052          Problem Specific Goal 1 (OT)    Problem Specific Goal 1 (OT) Pt will tolerate at least 4 continuous minutes of standing ADL with SPV or less and zero LOB for increased balance required for ADLs and mobility.  -TO     Time Frame (Problem Specific Goal 1, OT) long term goal (LTG);by discharge  -TO     Progress/Outcome (Problem Specific Goal 1, OT) goal not met  -TO               User Key  (r) = Recorded By, (t) = Taken By, (c) = Cosigned By      Initials Name Provider Type    TO Issa Ventura COTA Occupational Therapist Assistant                   Clinical Impression       Row Name 09/26/23 1032          Pain Assessment    Pretreatment Pain Rating 6/10  -TO     Posttreatment Pain Rating 6/10  -TO       Row Name 09/26/23 1032          Plan of Care Review    Plan of Care Reviewed With patient  -TO     Progress improving  -TO     Outcome Evaluation Pt mod I for all bed mobility, SBA for sit< > stand, CGA/MIn A for t/f EOB < > toilet  with RW 2/2 decreased sensation/control of LLE. Pt peformed B UE ther ex 2 x 20 via yellow theraband. Pt cooperative and pleasant.  -TO       Row Name 09/26/23 1032          Therapy Assessment/Plan (OT)    Rehab Potential (OT) good, to achieve stated therapy goals  -TO     Criteria for Skilled Therapeutic Interventions Met (OT) yes;meets criteria  -TO     Therapy  Frequency (OT) other (see comments)  5-7d/wk  -TO       Row Name 09/26/23 1032          Therapy Plan Review/Discharge Plan (OT)    Anticipated Discharge Disposition (OT) other (see comments);skilled nursing facility;inpatient rehabilitation facility;home with assist;home with home health  -TO       Row Name 09/26/23 1032          Positioning and Restraints    In Bed call light within reach;encouraged to call for assist;exit alarm on;supine  -TO               User Key  (r) = Recorded By, (t) = Taken By, (c) = Cosigned By      Initials Name Provider Type    TO Issa Ventura COTA Occupational Therapist Assistant                   Outcome Measures       Row Name 09/26/23 1052          How much help from another is currently needed...    Putting on and taking off regular lower body clothing? 3  -TO     Bathing (including washing, rinsing, and drying) 3  -TO     Toileting (which includes using toilet bed pan or urinal) 3  -TO     Putting on and taking off regular upper body clothing 4  -TO     Taking care of personal grooming (such as brushing teeth) 4  -TO     Eating meals 4  -TO     AM-PAC 6 Clicks Score (OT) 21  -TO       Row Name 09/26/23 1658 09/26/23 0855       How much help from another person do you currently need...    Turning from your back to your side while in flat bed without using bedrails? 4  - 4  -JH    Moving from lying on back to sitting on the side of a flat bed without bedrails? 4  - 4  -JH    Moving to and from a bed to a chair (including a wheelchair)? 3  - 3  -JH    Standing up from a chair using your arms (e.g., wheelchair, bedside chair)? 4  - 3  -JH    Climbing 3-5 steps with a railing? 3  - 3  -JH    To walk in hospital room? 3  - 3  -JH    AM-PAC 6 Clicks Score (PT) 21  - 20  -JH    Highest level of mobility 6 --> Walked 10 steps or more  - 6 --> Walked 10 steps or more  -JH              User Key  (r) = Recorded By, (t) = Taken By, (c) = Cosigned By      Initials Name  Provider Type     Nellie Arita, NEVIN Physical Therapist Assistant    TO Issa Ventura COTA Occupational Therapist Assistant    Qiana Harrison LPN Licensed Nurse                    Occupational Therapy Education       Title: PT OT SLP Therapies (In Progress)       Topic: Occupational Therapy (In Progress)       Point: ADL training (Done)       Description:   Instruct learner(s) on proper safety adaptation and remediation techniques during self care or transfers.   Instruct in proper use of assistive devices.                  Learning Progress Summary             Patient Acceptance, E,TB, VU by  at 9/20/2023 4817    Comment: OT POC, Role of OT, d/c recommendations                         Point: Home exercise program (Not Started)       Description:   Instruct learner(s) on appropriate technique for monitoring, assisting and/or progressing therapeutic exercises/activities.                  Learner Progress:  Not documented in this visit.              Point: Precautions (Not Started)       Description:   Instruct learner(s) on prescribed precautions during self-care and functional transfers.                  Learner Progress:  Not documented in this visit.              Point: Body mechanics (Not Started)       Description:   Instruct learner(s) on proper positioning and spine alignment during self-care, functional mobility activities and/or exercises.                  Learner Progress:  Not documented in this visit.                              User Key       Initials Effective Dates Name Provider Type Discipline     11/18/22 -  Perlita Rich OT Occupational Therapist OT                  OT Recommendation and Plan  Therapy Frequency (OT): other (see comments) (5-7d/wk)  Plan of Care Review  Plan of Care Reviewed With: patient  Progress: improving  Outcome Evaluation: Pt mod I for all bed mobility, SBA for sit< > stand, CGA/MIn A for t/f EOB < > toilet  with RW 2/2 decreased sensation/control of LLE. Pt  peformed B UE ther ex 2 x 20 via yellow theraband. Pt cooperative and pleasant.     Time Calculation:         Time Calculation- OT       Row Name 09/26/23 1710             Time Calculation- OT    OT Start Time 1052  -TO      OT Stop Time 1122  -TO      OT Time Calculation (min) 30 min  -TO      Total Timed Code Minutes- OT 30 minute(s)  -TO      OT Received On 09/26/23  -TO         Timed Charges    63252 - OT Therapeutic Exercise Minutes 15  -TO      21471 - OT Self Care/Mgmt Minutes 15  -TO         Total Minutes    Timed Charges Total Minutes 30  -TO       Total Minutes 30  -TO                User Key  (r) = Recorded By, (t) = Taken By, (c) = Cosigned By      Initials Name Provider Type    TO Issa Ventura COTA Occupational Therapist Assistant                  Therapy Charges for Today       Code Description Service Date Service Provider Modifiers Qty    52756831161 HC OT THER PROC EA 15 MIN 9/26/2023 Issa Ventura COTA GO 1    85644489217 HC OT SELF CARE/MGMT/TRAIN EA 15 MIN 9/26/2023 Issa Ventura COTA GO 1                 LINDSEY Bragg  9/26/2023    Electronically signed by Issa Ventura COTA at 09/26/23 1711       Issa Ventura COTA at 09/26/23 1710          Goal Outcome Evaluation:  Plan of Care Reviewed With: patient        Progress: improving  Outcome Evaluation: Pt mod I for all bed mobility, SBA for sit< > stand, CGA/MIn A for t/f EOB < > toilet  with RW 2/2 decreased sensation/control of LLE. Pt peformed B UE ther ex 2 x 20 via yellow theraband. Pt cooperative and pleasant.      Anticipated Discharge Disposition (OT): other (see comments), skilled nursing facility, inpatient rehabilitation facility, home with assist, home with home health    Electronically signed by Issa Ventura COTA at 09/26/23 1710

## 2023-09-28 NOTE — PLAN OF CARE
Goal Outcome Evaluation:  Plan of Care Reviewed With: patient        Progress: improving  Outcome Evaluation: Pt resting in bed at this time, no falls noted, no new skin issues, pain controlled with medication and rest, no suicidal ideations noted

## 2023-09-28 NOTE — PLAN OF CARE
Goal Outcome Evaluation:  Plan of Care Reviewed With: patient        Progress: improving  Outcome Evaluation: Pt. was mod(I) for bed mobility and sba/cga for transfers and amb. 24' 42' and 8' with RW and cga and ataxic gait.  Pt. showed some improvement with endurance and strength this tx. and better control.  Pt. also performed a couple exercies in  bed post gait.  Pt. met no new goals and would benefit from continued skilled PT for gait quality.

## 2023-09-28 NOTE — PLAN OF CARE
Goal Outcome Evaluation:  Plan of Care Reviewed With: patient, friend        Progress: improving  Outcome Evaluation: Sup>sit-Ind. Sit>stand-CGA, Fxl mobility ~8' x2 to and from BR w/ CGA and RW. Toilet t/f-CGA. Pericare hygiene-Ind. Grooming-set up. Pt participated in BUE ther ex w/ 2lb HW and good tolerance. Sit>sup-Ind. Pt w/ all needs in reach upon exit. Cont OT POC.      Anticipated Discharge Disposition (OT): other (see comments), skilled nursing facility, inpatient rehabilitation facility, home with assist, home with home health

## 2023-09-29 PROCEDURE — 97535 SELF CARE MNGMENT TRAINING: CPT

## 2023-09-29 PROCEDURE — 97116 GAIT TRAINING THERAPY: CPT

## 2023-09-29 PROCEDURE — 25010000002 ENOXAPARIN PER 10 MG: Performed by: INTERNAL MEDICINE

## 2023-09-29 RX ORDER — HYDROCODONE BITARTRATE AND ACETAMINOPHEN 7.5; 325 MG/1; MG/1
1 TABLET ORAL EVERY 4 HOURS PRN
Qty: 18 TABLET | Refills: 0 | Status: SHIPPED | OUTPATIENT
Start: 2023-09-29 | End: 2023-09-30 | Stop reason: SDUPTHER

## 2023-09-29 RX ORDER — METOCLOPRAMIDE 10 MG/1
10 TABLET ORAL EVERY 6 HOURS
Qty: 120 TABLET | Refills: 0 | Status: SHIPPED | OUTPATIENT
Start: 2023-09-29 | End: 2023-09-30 | Stop reason: SDUPTHER

## 2023-09-29 RX ADMIN — OFLOXACIN 50000 UNITS: 300 TABLET, COATED ORAL at 08:41

## 2023-09-29 RX ADMIN — LISINOPRIL 10 MG: 10 TABLET ORAL at 20:28

## 2023-09-29 RX ADMIN — HYDROCODONE BITARTRATE AND ACETAMINOPHEN 1 TABLET: 7.5; 325 TABLET ORAL at 22:42

## 2023-09-29 RX ADMIN — NICOTINE 1 PATCH: 21 PATCH, EXTENDED RELEASE TRANSDERMAL at 20:27

## 2023-09-29 RX ADMIN — HYDROCODONE BITARTRATE AND ACETAMINOPHEN 1 TABLET: 7.5; 325 TABLET ORAL at 18:44

## 2023-09-29 RX ADMIN — HYDROCODONE BITARTRATE AND ACETAMINOPHEN 1 TABLET: 7.5; 325 TABLET ORAL at 10:47

## 2023-09-29 RX ADMIN — DOCUSATE SODIUM 50 MG AND SENNOSIDES 8.6 MG 2 TABLET: 8.6; 5 TABLET, FILM COATED ORAL at 08:42

## 2023-09-29 RX ADMIN — METOCLOPRAMIDE 10 MG: 10 TABLET ORAL at 10:47

## 2023-09-29 RX ADMIN — METOCLOPRAMIDE 10 MG: 10 TABLET ORAL at 20:27

## 2023-09-29 RX ADMIN — METOCLOPRAMIDE 10 MG: 10 TABLET ORAL at 16:53

## 2023-09-29 RX ADMIN — HYDROCODONE BITARTRATE AND ACETAMINOPHEN 1 TABLET: 7.5; 325 TABLET ORAL at 14:39

## 2023-09-29 RX ADMIN — LISINOPRIL 10 MG: 10 TABLET ORAL at 08:41

## 2023-09-29 RX ADMIN — HYDROCODONE BITARTRATE AND ACETAMINOPHEN 1 TABLET: 7.5; 325 TABLET ORAL at 06:31

## 2023-09-29 RX ADMIN — METOCLOPRAMIDE 10 MG: 10 TABLET ORAL at 05:19

## 2023-09-29 RX ADMIN — DOCUSATE SODIUM 50 MG AND SENNOSIDES 8.6 MG 2 TABLET: 8.6; 5 TABLET, FILM COATED ORAL at 20:26

## 2023-09-29 RX ADMIN — HYDROCODONE BITARTRATE AND ACETAMINOPHEN 1 TABLET: 7.5; 325 TABLET ORAL at 02:22

## 2023-09-29 RX ADMIN — DULOXETINE HYDROCHLORIDE 20 MG: 20 CAPSULE, DELAYED RELEASE ORAL at 08:41

## 2023-09-29 RX ADMIN — Medication 5 MG: at 20:28

## 2023-09-29 RX ADMIN — ENOXAPARIN SODIUM 40 MG: 40 INJECTION SUBCUTANEOUS at 08:42

## 2023-09-29 NOTE — THERAPY TREATMENT NOTE
Acute Care - Physical Therapy Treatment Note  [facility]     Patient Name: Hanna Escalante  : 1965  MRN: [id number]  Today's Date: 2023      Visit Dx:     ICD-10-CM ICD-9-CM   1. Weakness of left lower extremity  R29.898 729.89   2. Impaired functional mobility, balance, gait, and endurance [Z74.09 (ICD-10-CM)]  Z74.09 V49.89   3. Impaired mobility and ADLs [Z74.09, Z78.9 (ICD-10-CM)]  Z74.09 V49.89    Z78.9      Patient Active Problem List   Diagnosis    Screening for malignant neoplasm of colon    Neck pain    Tobacco dependence    Insomnia    Back pain    Lower extremity weakness    Depression    Adjustment disorder with depressed mood    Cannabis abuse    Positive urine drug screen for methamphetamine     Past Medical History:   Diagnosis Date    Allergic     Arthritis     COPD (chronic obstructive pulmonary disease)     GERD (gastroesophageal reflux disease)     Hypertension     Infectious viral hepatitis     Low back pain      Past Surgical History:   Procedure Laterality Date    CHOLECYSTECTOMY      COLONOSCOPY N/A 2020    Procedure: COLONOSCOPY;  Surgeon: Joshua Perry MD;  Location: [facility] ENDOSCOPY;  Service: General    HYSTERECTOMY       PT Assessment (last 12 hours)       PT Evaluation and Treatment       Row Name 23 1309          Physical Therapy Time and Intention    Subjective Information no complaints  -TA     Document Type therapy note (daily note)  -TA     Mode of Treatment individual therapy;physical therapy  -TA       Row Name 23 1309          General Information    Patient Profile Reviewed yes  -TA     Existing Precautions/Restrictions fall  -TA       Row Name 23 1309          Pain    Pretreatment Pain Rating 0/10 - no pain  -TA     Posttreatment Pain Rating 0/10 - no pain  -TA       Row Name 23 1309          Cognition    Orientation Status (Cognition) oriented x 4  -TA     Personal Safety Interventions fall prevention program  maintained;gait belt;muscle strengthening facilitated;nonskid shoes/slippers when out of bed;supervised activity  -TA       Row Name 09/29/23 1309          Bed Mobility    Rolling Left [place] (Bed Mobility) modified independence  -TA     Scooting/Bridging [place] (Bed Mobility) modified independence  -TA     Supine-Sit [place] (Bed Mobility) modified independence  -TA     Sit-Supine [place] (Bed Mobility) not tested  -TA     Assistive Device (Bed Mobility) bed rails;head of bed elevated  -TA       Row Name 09/29/23 1309          Transfers    Transfers sit-stand transfer;stand-sit transfer  -TA       Row Name 09/29/23 1309          Sit-Stand Transfer    Sit-Stand [place] (Transfers) standby assist  -TA     Assistive Device (Sit-Stand Transfers) walker, front-wheeled  -TA       Row Name 09/29/23 1309          Stand-Sit Transfer    Stand-Sit [place] (Transfers) standby assist  -TA     Assistive Device (Stand-Sit Transfers) walker, front-wheeled  -TA       Row Name 09/29/23 1309          Toilet Transfer    Type (Toilet Transfer) sit-stand;stand-sit  -TA     [place] Level (Toilet Transfer) standby assist;contact guard  -TA     Assistive Device (Toilet Transfer) walker, front-wheeled  -TA       Row Name 09/29/23 1309          Gait/Stairs (Locomotion)    [place] Level (Gait) contact guard  -TA     Assistive Device (Gait) walker, front-wheeled  -TA     Distance in Feet (Gait) 160`  -TA     Pattern (Gait) step-to  -TA     Deviations/Abnormal Patterns (Gait) stride length decreased;gait speed decreased;ataxic  -TA     Bilateral Gait Deviations forward flexed posture  -TA       Row Name 09/29/23 1309          Plan of Care Review    Plan of Care Reviewed With patient  -TA     Progress improving  -TA     Outcome Evaluation pt sup>sit with independence, sit<>stand with SBA, pt ambulated 160` with RW & CGA. pt would benefit from 24/7 care & continued PT services  -TA       Row Name 09/29/23  1309          Vital Signs    Pre Systolic BP Rehab 137  -TA     Pre Treatment Diastolic BP 77  -TA     Post Systolic BP Rehab 146  -TA     Post Treatment Diastolic BP 80  -TA     Pretreatment Heart Rate (beats/min) 67  -TA     Posttreatment Heart Rate (beats/min) 59  -TA     Pre SpO2 (%) 98  -TA     O2 Delivery Pre Treatment room air  -TA     Post SpO2 (%) 97  -TA     O2 Delivery Post Treatment room air  -TA     Pre Patient Position Supine  -TA     Post Patient Position Sitting  -TA       Row Name 09/29/23 1309          Positioning and Restraints    Pre-Treatment Position in bed  -TA     Post Treatment Position bathroom  -TA     In Chair sitting;call light within reach;notified nsg  -TA       Row Name 09/29/23 1309          Therapy Assessment/Plan (PT)    Comment, Therapy Assessment/Plan (PT) continue  -TA       Row Name 09/29/23 1309          Bed Mobility Goal 1 (PT)    Activity/Assistive Device (Bed Mobility Goal 1, PT) sit to supine/supine to sit  -TA     [place] Level/Cues Needed (Bed Mobility Goal 1, PT) independent  -TA     Time Frame (Bed Mobility Goal 1, PT) by discharge  -TA     Progress/Outcomes (Bed Mobility Goal 1, PT) goal not met;continuing progress toward goal  -TA       Row Name 09/29/23 1309          Transfer Goal 1 (PT)    Activity/Assistive Device (Transfer Goal 1, PT) sit-to-stand/stand-to-sit;bed-to-chair/chair-to-bed;walker, rolling  -TA     [place] Level/Cues Needed (Transfer Goal 1, PT) modified independence  -TA     Strategies/Barriers (Transfers Goal 1, PT) Strong LEs, decreased sensation, ataxic gait.  -TA     Progress/Outcome (Transfer Goal 1, PT) goal not met;continuing progress toward goal  -TA       Row Name 09/29/23 1309          Gait Training Goal 1 (PT)    Activity/Assistive Device (Gait Training Goal 1, PT) walker, rolling  -TA     [place] Level (Gait Training Goal 1, PT) modified independence  -TA     Distance (Gait Training Goal 1, PT) 50'x2.  -TA     Time Frame  (Gait Training Goal 1, PT) by discharge  -TA     Strategies/Barriers (Gait Training Goal 1, PT) Strong LEs, decreased sensation, ataxic gait.  -TA     Progress/Outcome (Gait Training Goal 1, PT) goal not met;continuing progress toward goal  -TA       Row Name 09/29/23 1309          Stairs Goal 1 (PT)    Activity/Assistive Device (Stairs Goal 1, PT) ascending stairs;descending stairs  -TA     [place] Level/Cues Needed (Stairs Goal 1, PT) modified independence  -TA     Number of Stairs (Stairs Goal 1, PT) 1 step, may use FWW.  -TA     Time Frame (Stairs Goal 1, PT) by discharge  -TA     Strategies/Barriers (Stairs Goal 1, PT) Strong LEs, decreased sensation, ataxic gait.  -TA     Progress/Outcome (Stairs Goal 1, PT) goal not met  -TA               User Key  (r) = Recorded By, (t) = Taken By, (c) = Cosigned By      Initials Name Provider Type    TA Aida Salvador, PTA Physical Therapist Assistant                    Physical Therapy Education       Title: PT OT SLP Therapies (In Progress)       Topic: Physical Therapy (In Progress)       Point: Mobility training (In Progress)       Learning Progress Summary             Patient Acceptance, E, NR by  at 9/20/2023 1052    Comment: PT POC, hand  placenent with transfers, rehab process.                         Point: Home exercise program (Not Started)       Learner Progress:  Not documented in this visit.              Point: Body mechanics (Not Started)       Learner Progress:  Not documented in this visit.              Point: Precautions (Not Started)       Learner Progress:  Not documented in this visit.                              User Key       Initials Effective Dates Name Provider Type Discipline     07/11/23 -  Raul Leung, PT Physical Therapist PT                  PT Recommendation and Plan     Plan of Care Reviewed With: patient  Progress: improving  Outcome Evaluation: pt sup>sit with independence, sit<>stand with SBA, pt ambulated 160` with RW &  CGA. pt would benefit from 24/7 care & continued PT services   Outcome Measures       Row Name 09/29/23 1400             How much help from another person do you currently need...    Turning from your back to your side while in flat bed without using bedrails? 4  -TA      Moving from lying on back to sitting on the side of a flat bed without bedrails? 4  -TA      Moving to and from a bed to a chair (including a wheelchair)? 3  -TA      Standing up from a chair using your arms (e.g., wheelchair, bedside chair)? 3  -TA      Climbing 3-5 steps with a railing? 3  -TA      To walk in hospital room? 3  -TA      AM-PAC 6 Clicks Score (PT) 20  -TA         Functional Assessment    Outcome Measure Options AM-PAC 6 Clicks Basic Mobility (PT)  -TA                User Key  (r) = Recorded By, (t) = Taken By, (c) = Cosigned By      Initials Name Provider Type    Aida Chicas PTA Physical Therapist Assistant                     Time Calculation:    PT Charges       Row Name 09/29/23 1414             Time Calculation    Start Time 1309  -TA      Stop Time 1326  -TA      Time Calculation (min) 17 min  -TA      PT Received On 09/29/23  -TA         Time Calculation- PT    Total Timed Code Minutes- PT 17 minute(s)  -TA         Timed Charges    03362 - Gait Training Minutes  17  -TA         Total Minutes    Timed Charges Total Minutes 17  -TA       Total Minutes 17  -TA                User Key  (r) = Recorded By, (t) = Taken By, (c) = Cosigned By      Initials Name Provider Type    Adia Chicas PTA Physical Therapist Assistant                  Therapy Charges for Today       Code Description Service Date Service Provider Modifiers Qty    43056992311 HC GAIT TRAINING EA 15 MIN 9/29/2023 Aida Salvador PTA GP 1            PT G-Codes  Outcome Measure Options: AM-PAC 6 Clicks Basic Mobility (PT)  AM-PAC 6 Clicks Score (PT): 20  AM-PAC 6 Clicks Score (OT): 24    Aida Salvador PTA  9/29/2023

## 2023-09-29 NOTE — THERAPY TREATMENT NOTE
Patient Name: Hanna Escalante  : 1965    MRN: [id number]                              Today's Date: 2023       Admit Date: 2023    Visit Dx:     ICD-10-CM ICD-9-CM   1. Weakness of left lower extremity  R29.898 729.89   2. Impaired functional mobility, balance, gait, and endurance [Z74.09 (ICD-10-CM)]  Z74.09 V49.89   3. Impaired mobility and ADLs [Z74.09, Z78.9 (ICD-10-CM)]  Z74.09 V49.89    Z78.9      Patient Active Problem List   Diagnosis    Screening for malignant neoplasm of colon    Neck pain    Tobacco dependence    Insomnia    Back pain    Lower extremity weakness    Depression    Adjustment disorder with depressed mood    Cannabis abuse    Positive urine drug screen for methamphetamine     Past Medical History:   Diagnosis Date    Allergic     Arthritis     COPD (chronic obstructive pulmonary disease)     GERD (gastroesophageal reflux disease)     Hypertension     Infectious viral hepatitis     Low back pain      Past Surgical History:   Procedure Laterality Date    CHOLECYSTECTOMY      COLONOSCOPY N/A 2020    Procedure: COLONOSCOPY;  Surgeon: Joshua Perry MD;  Location: [facility] ENDOSCOPY;  Service: General    HYSTERECTOMY        General Information       Row Name 23 1045          OT Time and Intention    Document Type therapy note (daily note)  -KD     Mode of Treatment individual therapy;occupational therapy  -KD       Row Name 23 1045          General Information    Patient Profile Reviewed yes  -KD     Existing Precautions/Restrictions fall  -KD       Row Name 23 1045          Cognition    Orientation Status (Cognition) oriented x 4  -KD       Row Name 23 1045          Safety Issues, Functional Mobility    Impairments Affecting Function (Mobility) endurance/activity tolerance;coordination;balance;pain  -KD               User Key  (r) = Recorded By, (t) = Taken By, (c) = Cosigned By      Initials Name Provider Type    KD Theodora Lewis COTA  Occupational Therapist Assistant                     Mobility/ADL's       Row Name 09/29/23 1045          Bed Mobility    Supine-Sit [place] (Bed Mobility) independent  -     Sit-Supine [place] (Bed Mobility) independent  -KD       Row Name 09/29/23 1045          Sit-Stand Transfer    Sit-Stand [place] (Transfers) standby assist  -KD     Assistive Device (Sit-Stand Transfers) walker, front-wheeled  -KD       Row Name 09/29/23 1045          Stand-Sit Transfer    Stand-Sit [place] (Transfers) standby assist  -KD     Assistive Device (Stand-Sit Transfers) walker, front-wheeled  -KD       Row Name 09/29/23 1045          Toilet Transfer    [place] Level (Toilet Transfer) standby assist;contact guard  -     Assistive Device (Toilet Transfer) walker, front-wheeled  -       Row Name 09/29/23 1045          Functional Mobility    Functional Mobility- Ind. Level contact guard assist  -KD     Functional Mobility- Device walker, front-wheeled  -     Functional Mobility-Distance (Feet) 10  -KD       Row Name 09/29/23 1045          Activities of Daily Living    BADL Assessment/Intervention bathing;upper body dressing;lower body dressing;grooming;toileting  -KD       Row Name 09/29/23 1045          Lower Body Dressing Assessment/Training    [place] Level (Lower Body Dressing) lower body dressing skills;doff;don;socks;supervision  -     Position (Lower Body Dressing) edge of bed sitting  -       Row Name 09/29/23 1045          Toileting Assessment/Training    [place] Level (Toileting) toileting skills;adjust/manage clothing;perform perineal hygiene;independent  -     Assistive Devices (Toileting) commode  -     Position (Toileting) unsupported sitting  -       Row Name 09/29/23 1045          Bathing Assessment/Intervention    [place] Level (Bathing) bathing skills;lower body;upper body;standby assist  -KD     Assistive Devices (Bathing) bath mitt;grab bar,  tub/shower;long-handled sponge;shower chair  -KD     Position (Bathing) unsupported sitting  -KD       Row Name 09/29/23 1045          Upper Body Dressing Assessment/Training    [place] Level (Upper Body Dressing) upper body dressing skills;doff;don;set up  -KD     Position (Upper Body Dressing) edge of bed sitting  -KD       Row Name 09/29/23 1045          Grooming Assessment/Training    [place] Level (Grooming) grooming skills;hair care, combing/brushing;wash face, hands;oral care regimen;set up  -KD     Position (Grooming) unsupported sitting  -KD               User Key  (r) = Recorded By, (t) = Taken By, (c) = Cosigned By      Initials Name Provider Type    Theodora Ruiz COTA Occupational Therapist Assistant                   Obj/Interventions    No documentation.                  Goals/Plan       Row Name 09/29/23 1045          Transfer Goal 1 (OT)    Activity/Assistive Device (Transfer Goal 1, OT) toilet  -KD     [place] Level/Cues Needed (Transfer Goal 1, OT) supervision required  -KD     Time Frame (Transfer Goal 1, OT) long term goal (LTG);by discharge  -KD     Progress/Outcome (Transfer Goal 1, OT) goal not met  -KD       [place] Name 09/29/23 1045          Bathing Goal 1 (OT)    Activity/Device (Bathing Goal 1, OT) lower body bathing  -KD     [place] Level/Cues Needed (Bathing Goal 1, OT) supervision required  -KD     Time Frame (Bathing Goal 1, OT) long term goal (LTG);by discharge  -KD     Progress/Outcomes (Bathing Goal 1, OT) goal met  -KD       [place] Name 09/29/23 1045          Dressing Goal 1 (OT)    Activity/Device (Dressing Goal 1, OT) lower body dressing  -KD     [place]/Cues Needed (Dressing Goal 1, OT) supervision required  -KD     Time Frame (Dressing Goal 1, OT) long term goal (LTG);by discharge  -KD     Progress/Outcome (Dressing Goal 1, OT) goal met  -KD       Row Name 09/29/23 1045          Problem Specific Goal 1 (OT)    Problem Specific Goal 1 (OT) Pt will  tolerate at least 4 continuous minutes of standing ADL with SPV or less and zero LOB for increased balance required for ADLs and mobility.  -KD     Time Frame (Problem Specific Goal 1, OT) long term goal (LTG);by discharge  -KD     Progress/Outcome (Problem Specific Goal 1, OT) goal not met  -KD               User Key  (r) = Recorded By, (t) = Taken By, (c) = Cosigned By      Initials Name Provider Type     Theodora Lewis COTA Occupational Therapist Assistant                   Clinical Impression       Row Name 09/29/23 1045          Pain Assessment    Pretreatment Pain Rating 0/10 - no pain  -KD     Posttreatment Pain Rating 0/10 - no pain  -KD       Row Name 09/29/23 1045          Plan of Care Review    Plan of Care Reviewed With patient  -KD     Progress improving  -KD     Outcome Evaluation OT tx complete this date. Sup>sit-Ind. Sit>stand-SBA. Fxl mobility to BR sba/CGA w/ RW. Toilet t/f-CGA. Pericare-Ind. Shower t/f CGA. UB/LB bathing-Mod I. UB/LBdressing, oral care/comb hair-set up. Sit>sup-Ind. Pt w/ all needs in reach upon exit. Cont OT POC.  -KD       Row Name 09/29/23 1045          Therapy Assessment/Plan (OT)    Therapy Frequency (OT) other (see comments)  5-7 d/wk  -KD       Row Name 09/29/23 1045          Therapy Plan Review/Discharge Plan (OT)    Anticipated Discharge Disposition (OT) other (see comments);skilled nursing facility;inpatient rehabilitation facility;home with assist;home with home health  -KD       Row Name 09/29/23 1045          Vital Signs    Pre Systolic BP Rehab 127  -KD     Pre Treatment Diastolic BP 76  -KD     Pretreatment Heart Rate (beats/min) 75  -KD     Pre SpO2 (%) 95  -KD     O2 Delivery Pre Treatment room air  -KD     Pre Patient Position Supine  -KD     Intra Patient Position Standing  -KD     Post Patient Position Supine  -KD       Row Name 09/29/23 1045          Positioning and Restraints    Pre-Treatment Position in bed  -KD     Post Treatment Position bed  -KD      In Bed fowlers;call light within reach;encouraged to call for assist;exit alarm on  -KD               User Key  (r) = Recorded By, (t) = Taken By, (c) = Cosigned By      Initials Name Provider Type    Theodora Ruiz COTA Occupational Therapist Assistant                   Outcome Measures       Row Name 09/29/23 1045          How much help from another is currently needed...    Putting on and taking off regular lower body clothing? 4  -KD     Bathing (including washing, rinsing, and drying) 4  -KD     Toileting (which includes using toilet bed pan or urinal) 4  -KD     Putting on and taking off regular upper body clothing 4  -KD     Taking care of personal grooming (such as brushing teeth) 4  -KD     Eating meals 4  -KD     AM-PAC 6 Clicks Score (OT) 24  -KD       Row Name 09/29/23 0733          How much help from another person do you currently need...    Turning from your back to your side while in flat bed without using bedrails? 3  -HE     Moving from lying on back to sitting on the side of a flat bed without bedrails? 3  -HE     Moving to and from a bed to a chair (including a wheelchair)? 3  -HE     Standing up from a chair using your arms (e.g., wheelchair, bedside chair)? 3  -HE     Climbing 3-5 steps with a railing? 3  -HE     To walk in hospital room? 3  -HE     AM-PAC 6 Clicks Score (PT) 18  -HE     Highest level of mobility 6 --> Walked 10 steps or more  -HE               User Key  (r) = Recorded By, (t) = Taken By, (c) = Cosigned By      Initials Name Provider Type    Theodora Ruiz COTA Occupational Therapist Assistant    Louise Marquez, RN Registered Nurse                    Occupational Therapy Education       Title: PT OT SLP Therapies (In Progress)       Topic: Occupational Therapy (In Progress)       Point: ADL training (Done)       Description:   Instruct learner(s) on proper safety adaptation and remediation techniques during self care or transfers.   Instruct in proper use of  assistive devices.                  Learning Progress Summary             Patient Acceptance, E,TB, VU by CM at 9/20/2023 0425    Comment: OT POC, Role of OT, d/c recommendations                         Point: Home exercise program (Not Started)       Description:   Instruct learner(s) on appropriate technique for monitoring, assisting and/or progressing therapeutic exercises/activities.                  Learner Progress:  Not documented in this visit.              Point: Precautions (Not Started)       Description:   Instruct learner(s) on prescribed precautions during self-care and functional transfers.                  Learner Progress:  Not documented in this visit.              Point: Body mechanics (Not Started)       Description:   Instruct learner(s) on proper positioning and spine alignment during self-care, functional mobility activities and/or exercises.                  Learner Progress:  Not documented in this visit.                              User Key       Initials Effective Dates Name Provider Type Discipline     11/18/22 -  Perlita Rich, OT Occupational Therapist OT                  OT Recommendation and Plan  Therapy Frequency (OT): other (see comments) (5-7 d/wk)  Plan of Care Review  Plan of Care Reviewed With: patient  Progress: improving  Outcome Evaluation: OT tx complete this date. Sup>sit-Ind. Sit>stand-SBA. Fxl mobility to BR sba/CGA w/ RW. Toilet t/f-CGA. Pericare-Ind. Shower t/f CGA. UB/LB bathing-Mod I. UB/LBdressing, oral care/comb hair-set up. Sit>sup-Ind. Pt w/ all needs in reach upon exit. Cont OT POC.     Time Calculation:         Time Calculation- OT       Row Name 09/29/23 1045             Time Calculation- OT    OT Start Time 1045  -KD      OT Stop Time 1127  -KD      OT Time Calculation (min) 42 min  -KD      Total Timed Code Minutes- OT 42 minute(s)  -KD      OT Received On 09/29/23  -KD         Timed Charges    98611 - OT Self Care/Mgmt Minutes 42  -KD         Total  Minutes    Timed Charges Total Minutes 42  -KD       Total Minutes 42  -KD                User Key  (r) = Recorded By, (t) = Taken By, (c) = Cosigned By      Initials Name Provider Type    Theodora Ruiz COTA Occupational Therapist Assistant                  Therapy Charges for Today       Code Description Service Date Service Provider Modifiers Qty    07759991983 HC OT THER PROC EA 15 MIN 9/28/2023 Theodora Lewis COTA GO 2    91336890007 HC OT SELF CARE/MGMT/TRAIN EA 15 MIN 9/29/2023 Theodora Lewis COTA GO 3                 LINDSEY Powers  9/29/2023

## 2023-09-29 NOTE — PLAN OF CARE
Goal Outcome Evaluation:  Plan of Care Reviewed With: patient        Progress: improving  Outcome Evaluation: pt sup>sit with independence, sit<>stand with SBA, pt ambulated 160` with RW & CGA. pt would benefit from 24/7 care & continued PT services

## 2023-09-29 NOTE — PLAN OF CARE
Goal Outcome Evaluation:  Plan of Care Reviewed With: patient        Progress: no change  Outcome Evaluation: VSS; no new complaints.

## 2023-09-29 NOTE — DISCHARGE SUMMARY
[facility] Medicine Services  DISCHARGE SUMMARY       Date of Admission: 9/17/2023  Date of Discharge:  9/29/2023  Primary Care Physician: Ninfa Bhatti APRN    Presenting Problem/History of Present Illness:  Lower extremity weakness [R29.898]  Weakness of left lower extremity [R29.898]       Final Discharge Diagnoses:  Active Hospital Problems    Diagnosis     **Lower extremity weakness     Depression     Adjustment disorder with depressed mood     Cannabis abuse     Positive urine drug screen for methamphetamine        Consults:   Consults       Date and Time Order Name Status Description    9/19/2023 11:40 AM Inpatient Neurology Consult General Completed     9/18/2023  9:03 AM Inpatient Psychiatrist Consult Completed     9/18/2023  9:03 AM Inpatient Psychiatrist Consult Completed     9/17/2023  9:46 PM Inpatient Neurology Consult Stroke Completed     9/17/2023  9:46 PM Hospitalist (on-call MD unless specified)            Pertinent Test Results:   Lab Results (most recent)       Procedure Component Value Units Date/Time    Copper, Serum [[id number]] Collected: 09/19/23 1257    Specimen: Blood Updated: 09/26/23 1609     Copper 100 ug/dL      Comment:                                 Detection Limit = 5       Narrative:      Test(s) 001586-Copper, Serum or Plasma  was developed and its performance characteristics determined  by [facility]. It has not been cleared or approved by the Food  and Drug Administration.  Performed at:   - [address]  [id number]  : Yina Hunter MD, Phone:  [phone]    Jannet Barr Virus PCR CSF - Cerebrospinal Fluid, Lumbar Puncture [[id number]] Collected: 09/19/23 1357    Specimen: Cerebrospinal Fluid from Lumbar Puncture Updated: 09/24/23 1008     Jannet-Barr Virus Real Time Negative     Comment: No Jannet-Barr Virus DNA Detected.  This test was developed and its performance  characteristics determined  by [facility]. It has not been cleared or approved by the  [place] Food and Drug Administration. The FDA has determined that such  clearance or approval is not necessary. This test is used for clinical  purposes. It should not be regarded as investigational or research.       Narrative:      Performed at:  01 - [address]  [id number]  : Yina Hunter MD, Phone:  [phone]    IgG Index & Synthesis Rate (Collect RED Top) - Cerebrospinal Fluid, Lumbar Puncture [[id number]]  (Abnormal) Collected: 09/19/23 1357    Specimen: Cerebrospinal Fluid from Lumbar Puncture Updated: 09/22/23 1312     IgG, CSF 2.7 mg/dL      Albumin, CSF 23 mg/dL      IgG 768 mg/dL      Albumin 3.6 g/dL      IgG/Alb Ratio, CSF 0.12     IgG Index, CSF 0.6     IgG Synthesis Rate, CSF    -.3 mg/day      CSF/Serum Albumin Index 6     Comment:          Relationship to blood-brain barrier:            Consistent with an intact barrier        <9            Slight impairment                   9 -  14            Moderate impairment                14 -  30            Severe impairment                  30 - 100            Complete breakdown                     >100       Narrative:      Performed at:  [address] - [address]  [id number]  : Samson Ervin PhD, Phone:  [phone]    Vitamin E [[id number]]  (Abnormal) Collected: 09/19/23 1257    Specimen: Blood Updated: 09/22/23 1312     Vitamin E (Alpha Tocopherol) 6.4 mg/L      Vitamin E (Gamma Tocopherol) 1.0 mg/L      Comment: Reference intervals for alpha and gamma-tocopherol determined from  National Health and Nutrition Examination Survey, 8008-7218.  Individuals with alpha-tocopherol levels less than 5.0 mg/L are  considered vitamin E deficient.       Narrative:      Test(s) 070141-Vitamin E(Alpha Tocopherol); 212424-  Vitamin E(Gamma Tocopherol)  was developed and its  performance characteristics determined  by [facility]. It has not been cleared or approved by the Food  and Drug Administration.  Performed at:  [address] - [address]  [id number]  : Yina Hunter MD, Phone:  [phone]    VDRL, CSF - Cerebrospinal Fluid, Lumbar Puncture [[id number]] Collected: 09/19/23 1357    Specimen: Cerebrospinal Fluid from Lumbar Puncture Updated: 09/21/23 1609     VDRL, Quantitative, CSF Non Reactive    Narrative:      Performed at:  [address] - [address]  [id number]  : Yina Hunter MD, Phone:  [phone]    Oligoclonal Banding (COLLECT RED TUBE) [[id number]] Collected: 09/19/23 1257    Specimen: Cerebrospinal Fluid from Lumbar Puncture Updated: 09/21/23 1512    Narrative:      The following orders were created for panel order Oligoclonal Banding (COLLECT RED TUBE).  Procedure                               Abnormality         Status                     ---------                               -----------         ------                     Oligoclonal Banding - Ce...[[id number]]                      Final result               Red Top[[id number]]                                          Final result                 Please view results for these tests on the individual orders.    Oligoclonal Banding - Cerebrospinal Fluid, Lumbar Puncture [[id number]] Collected: 09/19/23 1357    Specimen: Cerebrospinal Fluid from Lumbar Puncture Updated: 09/21/23 1512     Oligoclonal Bands, CSF Comment     Comment: Zero (0) oligoclonal bands were observed in the CSF.  Interpretation:   Criteria for Positivity: Four (4) or more oligoclonal bands observed   only in the CSF have been shown to be most consistent with MS   using our method. [Gudelia AS, Lavelle EL, Alejandro BG, and   Anderson JA: Cerebrospinal Fluid Oligoclonal Bands in the Diagnosis   of Multiple Sclerosis. Am J Clin Pathol 120(5):672-675, 2003].   Oligoclonal  bands that are present only in the CSF have been   associated with a variety of inflammatory brain diseases such as   multiple sclerosis (MS), subacute encephalitis, neurosyphilis, etc.   Increased IgG in the CSF is not specific for MS, but is an indication   of chronic neural inflammation. Clinical correlation indicated.   Approximately 2-3% of clinically confirmed MS patients show little   or no evidence of oligoclonal bands in the CSF; however oligoclonal   bands may develop as the disease progresses.   Oligoclonal Banding testing performed using Isoelectric Focusing   (IEF) and immunoblotting methodology.       Narrative:      Performed at:  [address] [place] [address]  [id number]  : Samson Ervin PhD, Phone:  [phone]    Basic Metabolic Panel [[id number]]  (Abnormal) Collected: 09/21/23 0620    Specimen: Blood Updated: 09/21/23 0649     Glucose 101 mg/dL      BUN 25 mg/dL      Creatinine 0.98 mg/dL      Sodium 137 mmol/L      Potassium 4.5 mmol/L      Chloride 101 mmol/L      CO2 28.0 mmol/L      Calcium 9.3 mg/dL      BUN/Creatinine Ratio 25.5     Anion Gap 8.0 mmol/L      eGFR 66.6 mL/min/1.73     Narrative:      GFR Normal >60  Chronic Kidney Disease <60  Kidney Failure <15      CBC & Differential [[id number]]  (Abnormal) Collected: 09/21/23 0620    Specimen: Blood Updated: 09/21/23 0624    Narrative:      The following orders were created for panel order CBC & Differential.  Procedure                               Abnormality         Status                     ---------                               -----------         ------                     CBC Auto Differential[[id number]]        Abnormal            Final result                 Please view results for these tests on the individual orders.    CBC Auto Differential [[id number]]  (Abnormal) Collected: 09/21/23 0620    Specimen: Blood Updated: 09/21/23 0624     WBC 6.02 10*3/mm3      RBC 4.98 10*6/mm3      Hemoglobin  14.6 g/dL      Hematocrit 43.8 %      MCV 88.0 fL      MCH 29.3 pg      MCHC 33.3 g/dL      RDW 12.2 %      RDW-SD 39.6 fl      MPV 10.2 fL      Platelets 201 10*3/mm3      Neutrophil % 40.4 %      Lymphocyte % 43.5 %      Monocyte % 9.6 %      Eosinophil % 4.8 %      Basophil % 1.2 %      Immature Grans % 0.5 %      Neutrophils, Absolute 2.43 10*3/mm3      Lymphocytes, Absolute 2.62 10*3/mm3      Monocytes, Absolute 0.58 10*3/mm3      Eosinophils, Absolute 0.29 10*3/mm3      Basophils, Absolute 0.07 10*3/mm3      Immature Grans, Absolute 0.03 10*3/mm3      nRBC 0.0 /100 WBC     Basic Metabolic Panel [[id number]]  (Abnormal) Collected: 09/20/23 0511    Specimen: Blood Updated: 09/20/23 0602     Glucose 101 mg/dL      BUN 16 mg/dL      Creatinine 0.92 mg/dL      Sodium 137 mmol/L      Potassium 4.1 mmol/L      Chloride 101 mmol/L      CO2 28.0 mmol/L      Calcium 8.9 mg/dL      BUN/Creatinine Ratio 17.4     Anion Gap 8.0 mmol/L      eGFR 71.9 mL/min/1.73     Narrative:      GFR Normal >60  Chronic Kidney Disease <60  Kidney Failure <15      CBC & Differential [[id number]]  (Abnormal) Collected: 09/20/23 0511    Specimen: Blood Updated: 09/20/23 0547    Narrative:      The following orders were created for panel order CBC & Differential.  Procedure                               Abnormality         Status                     ---------                               -----------         ------                     CBC Auto Differential[[id number]]        Abnormal            Final result                 Please view results for these tests on the individual orders.    CBC Auto Differential [[id number]]  (Abnormal) Collected: 09/20/23 0511    Specimen: Blood Updated: 09/20/23 0547     WBC 6.43 10*3/mm3      RBC 4.83 10*6/mm3      Hemoglobin 14.2 g/dL      Hematocrit 42.4 %      MCV 87.8 fL      MCH 29.4 pg      MCHC 33.5 g/dL      RDW 12.2 %      RDW-SD 39.2 fl      MPV 10.4 fL      Platelets 212 10*3/mm3      Neutrophil % 54.7 %       Lymphocyte % 32.8 %      Monocyte % 6.7 %      Eosinophil % 4.2 %      Basophil % 1.1 %      Immature Grans % 0.5 %      Neutrophils, Absolute 3.52 10*3/mm3      Lymphocytes, Absolute 2.11 10*3/mm3      Monocytes, Absolute 0.43 10*3/mm3      Eosinophils, Absolute 0.27 10*3/mm3      Basophils, Absolute 0.07 10*3/mm3      Immature Grans, Absolute 0.03 10*3/mm3      nRBC 0.0 /100 WBC     Vitamin D,25-Hydroxy [[id number]]  (Abnormal) Collected: 09/19/23 1257    Specimen: Blood Updated: 09/19/23 2001     25 Hydroxy, Vitamin D 13.3 ng/ml     Narrative:      Reference Range for Total Vitamin D 25(OH)     Deficiency <20.0 ng/mL   Insufficiency 21-29 ng/mL   Sufficiency  ng/mL  Toxicity >100 ng/ml      Folate [[id number]]  (Normal) Collected: 09/19/23 1257    Specimen: Blood Updated: 09/19/23 2001     Folate 10.10 ng/mL     Narrative:      Results may be falsely increased if patient taking Biotin.      Vitamin B12 [[id number]]  (Normal) Collected: 09/19/23 1257    Specimen: Blood Updated: 09/19/23 2001     Vitamin B-12 354 pg/mL     Narrative:      Results may be falsely increased if patient taking Biotin.      Meningitis / Encephalitis Panel, PCR - Cerebrospinal Fluid, Lumbar Puncture [[id number]]  (Normal) Collected: 09/19/23 1357    Specimen: Cerebrospinal Fluid from Lumbar Puncture Updated: 09/19/23 1525     ESCHERICHIA COLI K1, PCR Not Detected     HAEMOPHILUS INFLUENZAE, PCR Not Detected     LISTERIA MONOCYTOGENES, PCR Not Detected     NEISSERIA MENINGITIDIS, PCR Not Detected     STREPTOCOCCUS AGALACTIAE, PCR Not Detected     STREPTOCOCCUS PNEUMONIAE, PCR Not Detected     CYTOMEGALOVIRUS (CMV), PCR Not Detected     ENTEROVIRUS, PCR Not Detected     HERPES SIMPLEX VIRUS 1 (HSV-1), PCR Not Detected     HERPES SIMPLEX VIRUS 2 (HSV-2), PCR Not Detected     HUMAN PARECHOVIRUS, PCR Not Detected     VARICELLA ZOSTER VIRUS (VZV), PCR Not Detected     CRYPTOCOCCUS NEOFORMANS / GATTII, PCR Not Detected     HUMAN HERPES  VIRUS 6 PCR Not Detected    Narrative:      This test is performed by utilizing real time polymerace chain recation (PCR).   The [employer] ME Panel does not distinguish between latent and active CMV and HHV-6 infections. Detection of these viruses may indicate primary infection, secondary reactivation, or the presence of latent virus. Results should always be interpreted in conjunction with other clinical, laboratory, and epidemiological information.    Glucose, CSF - Cerebrospinal Fluid, Lumbar Puncture [[id number]]  (Abnormal) Collected: 09/19/23 1357    Specimen: Cerebrospinal Fluid from Lumbar Puncture Updated: 09/19/23 1501     Glucose, CSF 76 mg/dL     Cell Count With Differential, CSF Use CSF Tube: 1 [[id number]] Collected: 09/19/23 1357    Specimen: Cerebrospinal Fluid from Lumbar Puncture Updated: 09/19/23 1439    Narrative:      The following orders were created for panel order Cell Count With Differential, CSF Use CSF Tube: 1.  Procedure                               Abnormality         Status                     ---------                               -----------         ------                     Cell Count, CSF - Cerebr...[[id number]]                      Final result                 Please view results for these tests on the individual orders.    Cell Count, CSF - Cerebrospinal Fluid, Lumbar Puncture [[id number]] Collected: 09/19/23 1357    Specimen: Cerebrospinal Fluid from Lumbar Puncture Updated: 09/19/23 1439     Color, CSF Colorless     Appearance, CSF Clear     Volume, CSF 8.5 mL      Tube Number, CSF 1     WBC, CSF 2 /mm3      RBC, CSF 0 /mm3     Narrative:      Differential not indicated.    Red Top [[id number]] Collected: 09/19/23 1257    Specimen: Blood Updated: 09/19/23 1400     Extra Tube Hold for add-ons.     Comment: Auto resulted.       Magnesium [[id number]]  (Normal) Collected: 09/19/23 0556    Specimen: Blood Updated: 09/19/23 0701     Magnesium 1.9 mg/dL     Extra Tubes [[id number]]  Collected: 09/18/23 0831    Specimen: Blood, Venous Line Updated: 09/18/23 0945    Narrative:      The following orders were created for panel order Extra Tubes.  Procedure                               Abnormality         Status                     ---------                               -----------         ------                     Green Top (Gel)[[id number]]                                  Final result                 Please view results for these tests on the individual orders.    Green Top (Gel) [[id number]] Collected: 09/18/23 0831    Specimen: Blood Updated: 09/18/23 0945     Extra Tube Hold for add-ons.     Comment: Auto resulted.       Extra Tubes [[id number]] Collected: 09/17/23 1843    Specimen: Blood Updated: 09/17/23 2246    Narrative:      The following orders were created for panel order Extra Tubes.  Procedure                               Abnormality         Status                     ---------                               -----------         ------                     Gold Top - SST[[id number]]                                   Final result               Gray Top[[id number]]                                         Final result               Light Blue Top[[id number]]                                   Final result                 Please view results for these tests on the individual orders.    Gray Top [[id number]] Collected: 09/17/23 1843    Specimen: Blood Updated: 09/17/23 2246     Extra Tube Hold for add-ons.     Comment: Auto resulted.       Acetaminophen Level [[id number]]  (Normal) Collected: 09/17/23 1843    Specimen: Blood Updated: 09/17/23 2117     Acetaminophen <5.0 mcg/mL     Ethanol [[id number]] Collected: 09/17/23 1843    Specimen: Blood Updated: 09/17/23 2117     Ethanol <10 mg/dL      Ethanol % <0.010 %     Salicylate Level [[id number]]  (Normal) Collected: 09/17/23 1843    Specimen: Blood Updated: 09/17/23 2117     Salicylate <0.3 mg/dL     Gold Top - SST [[id number]] Collected: 09/17/23  1843    Specimen: Blood Updated: 09/17/23 1945     Extra Tube Hold for add-ons.     Comment: Auto resulted.       Light Blue Top [[id number]] Collected: 09/17/23 1843    Specimen: Blood Updated: 09/17/23 1945     Extra Tube Hold for add-ons.     Comment: Auto resulted       Fentanyl, Urine - Urine, Clean Catch [[id number]]  (Normal) Collected: 09/17/23 1847    Specimen: Urine, Clean Catch Updated: 09/17/23 1916     Fentanyl, Urine Negative    Narrative:      Negative Threshold:      Fentanyl 5 ng/mL     The normal value for the drug tested is negative. This report includes final unconfirmed screening results to be used for medical treatment purposes only. Unconfirmed results must not be used for non-medical purposes such as employment or legal testing. Clinical consideration should be applied to any drug of abuse test, particularly when unconfirmed results are used.           Magnesium [[id number]]  (Normal) Collected: 09/17/23 1843    Specimen: Blood Updated: 09/17/23 1912     Magnesium 2.0 mg/dL     Comprehensive Metabolic Panel [[id number]]  (Abnormal) Collected: 09/17/23 1843    Specimen: Blood Updated: 09/17/23 1912     Glucose 108 mg/dL      BUN 22 mg/dL      Creatinine 1.05 mg/dL      Sodium 139 mmol/L      Potassium 4.2 mmol/L      Chloride 103 mmol/L      CO2 26.0 mmol/L      Calcium 9.0 mg/dL      Total Protein 7.0 g/dL      Albumin 4.0 g/dL      ALT (SGPT) 41 U/L      AST (SGOT) 37 U/L      Alkaline Phosphatase 129 U/L      Total Bilirubin 0.5 mg/dL      Globulin 3.0 gm/dL      A/G Ratio 1.3 g/dL      BUN/Creatinine Ratio 21.0     Anion Gap 10.0 mmol/L      eGFR 61.3 mL/min/1.73     Narrative:      GFR Normal >60  Chronic Kidney Disease <60  Kidney Failure <15      CK [[id number]]  (Normal) Collected: 09/17/23 1843    Specimen: Blood Updated: 09/17/23 1912     Creatine Kinase 56 U/L     Urine Drug Screen - Urine, Clean Catch [[id number]]  (Abnormal) Collected: 09/17/23 1847    Specimen: Urine, Clean Catch  Updated: 09/17/23 1909     THC, Screen, Urine Positive     Phencyclidine (PCP), Urine Negative     Cocaine Screen, Urine Negative     Methamphetamine, Ur Negative     Opiate Screen Negative     Amphetamine Screen, Urine Negative     Benzodiazepine Screen, Urine Negative     Tricyclic Antidepressants Screen Negative     Methadone Screen, Urine Negative     Barbiturates Screen, Urine Negative     Oxycodone Screen, Urine Negative     Propoxyphene Screen Negative     Buprenorphine, Screen, Urine Negative    Narrative:      Cutoff For Drugs Screened:    Amphetamines               500 ng/ml  Barbiturates               200 ng/ml  Benzodiazepines            150 ng/ml  Cocaine                    150 ng/ml  Methadone                  200 ng/ml  Opiates                    100 ng/ml  Phencyclidine               25 ng/ml  THC                            50 ng/ml  Methamphetamine            500 ng/ml  Tricyclic Antidepressants  300 ng/ml  Oxycodone                  100 ng/ml  Propoxyphene               300 ng/ml  Buprenorphine               10 ng/ml    The normal value for all drugs tested is negative. This report includes unconfirmed screening results, with the cutoff values listed, to be used for medical treatment purposes only.  Unconfirmed results must not be used for non-medical purposes such as employment or legal testing.  Clinical consideration should be applied to any drug of abuse test, particularly when unconfirmed results are used.      Urinalysis, Microscopic Only - Urine, Clean Catch [[id number]]  (Abnormal) Collected: 09/17/23 1847    Specimen: Urine, Clean Catch Updated: 09/17/23 1859     RBC, UA 0-2 /HPF      WBC, UA 0-2 /HPF      Comment: Urine culture not indicated.        Bacteria, UA None Seen /HPF      Squamous Epithelial Cells, UA 0-2 /HPF      Hyaline Casts, UA None Seen /LPF      Methodology Automated Microscopy    Urinalysis With Culture If Indicated - Urine, Clean Catch [[id number]]  (Abnormal) Collected:  09/17/23 1847    Specimen: Urine, Clean Catch Updated: 09/17/23 1858     Color, UA Yellow     Appearance, UA Clear     pH, UA 5.5     Specific Gravity, UA 1.020     Glucose, UA Negative     Ketones, UA Negative     Bilirubin, UA Negative     Blood, UA Negative     Protein, UA Negative     Leuk Esterase, UA Trace     Nitrite, UA Negative     Urobilinogen, UA 1.0 E.U./dL    Narrative:      In absence of clinical symptoms, the presence of pyuria, bacteria, and/or nitrites on the urinalysis result does not correlate with infection.          Imaging Results (Most Recent)       Procedure Component Value Units Date/Time    IR LUMBAR PUNCTURE DIAGNOSTIC [[id number]] Collected: 09/19/23 1440     Updated: 09/19/23 1447    Narrative:      PROCEDURE: Fluoroscopic guided lumbar puncture    COMPARISON: No comparison    HISTORY: Lower extremity weakness and numbness. Abnormal thoracic spine MRI.    Total fluoroscopy time: 22 seconds  DAP: 86.4 uGy/m^2    TECHNIQUE:  The risks including infection, headaches and bleeding, as well as the benefits  and alternatives were thoroughly explained to the patient. The patient had ample  opportunity to ask questions and agreed to proceed.  Written, informed consent  was obtained.    The patient was placed in the prone position on the fluoroscopy table. A midline  approach was selected at the L4-5 level. The skin was prepped and draped in  sterile fashion. Approximately 10 mL of 1% lidocaine solution was used for local  anesthesia. A 3.5 inch, 20 gauge needle and stylet were advanced into the thecal  sac under direct fluoroscopic guidance.    FINDINGS:  Approximately 9 mL of clear CSF was removed and sent to the laboratory for  further analysis. The patient tolerated the procedure well. No immediate post  procedure complications.      Impression:      1. Successful fluoroscopic guided lumbar, as described above.      MRI Cervical Spine With & Without Contrast [[id number]] Collected: 09/19/23 1111      Updated: 09/19/23 1118    Narrative:      History: Neck pain, chronicLLE hyperreflexia and weakness    COMPARISON: None    TECHNIQUE: Multiplanar, multisequence images were obtained of the cervical  spine. IV contrast was administered    FINDINGS:      Normal alignment of the cervical vertebra.  Bone marrow signal is age-appropriate.  There is no fracture.    Cervical spinal cord is normal in course and caliber, without signal  abnormality.    DEGENERATIVE CHANGES:    C2-C3: No significant spinal stenosis or foraminal narrowing.    C3-C4: No significant spinal stenosis or foraminal narrowing.    C4-C5: Mild bilateral foraminal narrowing secondary to facet and uncovertebral  hypertrophy.    C5-C6: Mild bilateral foraminal narrowing secondary to facet and uncovertebral  hypertrophy.    C6-C7: No significant spinal stenosis or foraminal narrowing.    C7-T1: No significant spinal stenosis or foraminal narrowing.      Impression:      1.At the C4-5 and C5-6 levels there is mild bilateral foraminal narrowing  secondary to facet and uncovertebral hypertrophy.    MRI Thoracic Spine With & Without Contrast [[id number]] Collected: 09/19/23 1102     Updated: 09/19/23 1115    Narrative:      History: Ataxia, nontraumatic, thoracic pathology suspectedLLE hyperreflexia and  weakness    COMPARISON: None    TECHNIQUE: Multiplanar, multisequence images were obtained of the thoracic  spine.    FINDINGS:      Normal alignment of the thoracic vertebra.  Bone marrow signal is age-appropriate.  There is no fracture.    Thoracic spinal cord is normal course and caliber.  There is signal abnormality  within the dorsal column of the spinal cord extending from the T4 level through  the T8 level.  No abnormal enhancement.    Mild multilevel degenerative disc disease.  No significant disc herniation,  spinal stenosis or foraminal narrowing.          Impression:      1.Abnormal T2 hyperintensity within the dorsal column of the thoracic  spinal  cord from the T4 level through the T8 level.  No abnormal enhancement.  2.  These findings are nonspecific.  This may represent a demyelinating process.  An infectious/viral myelitis is also consideration.  This is not have the  typical appearance of neoplasm which is considered unlikely.  Possibility of  cord infarct is also a consideration.  3.  Mild multilevel degenerative disc disease.  No significant disc herniation,  spinal stenosis or foraminal narrowing.      MRI Brain Without Contrast [[id number]] Collected: 09/18/23 1134     Updated: 09/18/23 1607    Narrative:      INDICATION:  Neuro deficit, acute, stroke suspected.    COMPARISON:  None relevant.    TECHNIQUE:  Multisequence, multiplanar MRI of the brain was performed without intravenous  contrast.    FINDINGS:  No acute infarction, hemorrhage, extra-axial fluid collection, hydrocephalus, or  mass.  Mild/moderate supratentorial white matter changes.  Mild mucosal  thickening right maxillary sinus.      Impression:      No acute intracranial abnormality.    Mild/moderate white matter changes.  Differentials include microangiopathy and  sequela of migraine headaches          XR Abdomen 2+ VW with Chest 1 VW [[id number]] Collected: 09/18/23 1533     Updated: 09/18/23 1556    Narrative:      INDICATION:  rule out obstruction.    COMPARISON:  None relevant.    FINDINGS:  Cardiac size is not enlarged.  No pleural effusion or pneumothorax.  No dense  airspace consolidation. Visualized osseous structures are intact.    Nonobstructive bowel gas pattern.  Moderate to large volume of colonic stool.  No free air.          MRI Lumbar Spine Without Contrast [[id number]] Collected: 09/18/23 1103     Updated: 09/18/23 1109    Narrative:      HISTORY:  Low back pain, cauda equina syndrome suspected      COMPARISON:  None.    TECHNIQUE:  Multiplanar multisequence images were obtained of the lumbar spine.    FINDINGS:      Normal alignment of the lumbar  vertebra.  Bone marrow signal is age-appropriate.  There is no fracture.    DEGENERATIVE CHANGES:    L5-S1: Moderate facet hypertrophy left greater than right.  Mild degenerative  disc disease.  No significant disc herniation.  Moderate left-sided foraminal  narrowing.    L4-5: Moderate facet hypertrophy.  Mild degenerative disc disease.  Smallest  bulge.  Moderate spinal stenosis prominent transverse dimension and moderate  bilateral foraminal narrowing.    L3-4: Moderate degenerative disc disease.  Smallest bulge and facet hypertrophy.  Mild spinal stenosis moderate bilateral foraminal narrowing.    L2-3: Mild degenerative disease.  Tiny disc bulge without significant spinal  stenosis.  Mild bilateral foraminal narrowing    L1-2: Tiny disc bulge without spinal stenosis or foraminal narrowing        Impression:      1.  Changes of spondylosis and facet hypertrophy as detailed above.    US Arterial Doppler Lower Extremity Complete [[id number]] Collected: 09/18/23 0811     Updated: 09/18/23 0815    Narrative:      Indication:  Bilateral lower extremity weakness    TECHNIQUE:  High-resolution gray scale imaging, color flow Doppler, spectral waveform  analysis and ankle/brachial indices were obtained for the bilateral lower  extremity.    Comparison:  None    FINDINGS:  There is normal multiphasic flow identified throughout the arterial vasculature  of the bilateral lower extremity.  Ankle/brachial indices were not obtained.  No  hemodynamically significant stenosis is noted.      Impression:      Normal arterial duplex ultrasound of the bilateral lower extremity based on  normal arterial waveforms.        CT Abdomen Pelvis Without Contrast [[id number]] Collected: 09/17/23 2057     Updated: 09/17/23 2102    Narrative:      CT ABDOMEN PELVIS WO CONTRAST    HISTORY: Abdominal pain, acute, nonlocalized    COMPARISON: CT abdomen pelvis 9/13/2023    Automated exposure control was also utilized to decrease patient radiation  dose.    TECHNIQUE: Images of the abdomen and pelvis were obtained.  No IV contrast was  ministered.  Multiplanar reformatted images were provided for review.      FINDINGS:      Mild subsegmental atelectasis in the middle lobe.    Visualized mediastinal structures are normal.    Osseous structures are age-appropriate.    Liver is normal.  Spleen is unremarkable.  Bilateral kidneys are within normal limits.  No renal calculus or obstructive  uropathy.  Bilateral adrenal glands are normal.  Pancreas is normal.  Cholecystectomy.    No bowel obstruction.    The rectum is normal.  Bladder is normal.    No free air or free fluid.    No lymphadenopathy.          Impression:      1.  No acute inflammatory process throughout the abdomen or pelvis.        CT Head Without Contrast [[id number]] Collected: 09/17/23 1859     Updated: 09/17/23 1902    Narrative:      HEAD CT WITHOUT IV CONTRAST    HISTORY:  Unable to feel legs.    COMPARISON:  None.    TECHNIQUE:  Routine helical imaging through the brain with axial, coronal and sagittal  two-dimensional reformatted images.    FINDINGS:  There is no evidence of acute intracranial hemorrhage, mass effect or midline  shift. No abnormal extra-axial fluid collection. No areas of abnormal  attenuation within the brain parenchyma to suggest an acute infarction. The  ventricles, cisterns, and sulci are within normal limits in size and  configuration. The visualized skull base structures and paranasal sinuses are  unremarkable. No calvarial fracture or other lesion.      Impression:      No acute intracranial process.            Chief Complaint on Day of Discharge: Improving weakness    Hospital Course:  The patient is a 57 y.o. female who presented to [facility] with left lower extremity weakness.  Patient underwent MRI.  Neurology was consulted.  Patient was found to have abnormal T2 hyperintensity within the dorsal column of the thoracic spinal cord from T4-T8.  She  "did express suicidal ideations and was seen by psychiatry.  As her symptoms improved her suicidality resolved and she was cleared by Dr. Carlin.  Due to abnormal MRI, lumbar puncture was completed and was unremarkable.  Patient's symptoms proved significantly throughout the remainder of her hospital course.  She worked with physical therapy and Occupational Therapy.  Patient was evaluated by multiple rehabilitation facilities, but unfortunately due to her history of drug abuse she was denied by all of them.  The only option left was home with home health.  This was arranged and patient was discharged home.  She will follow-up with neurology in 6 weeks and her primary care provider in 1 to 2 weeks    Condition on Discharge: Hemodynamically stable    Physical Exam on Discharge:  /76 (BP Location: Right arm, Patient Position: Lying)   Pulse 75   Temp 97.3 °F (36.3 °C) (Temporal)   Resp 16   Ht 154.9 cm (61\")   Wt 71.7 kg (158 lb)   SpO2 95%   BMI 29.85 kg/m²   Physical Exam  Vitals reviewed.   Constitutional:       Appearance: She is well-developed.   HENT:      Head: Normocephalic and atraumatic.   Eyes:      Pupils: Pupils are equal, round, and reactive to light.   Cardiovascular:      Rate and Rhythm: Normal rate and regular rhythm.      Heart sounds: Normal heart sounds. No murmur heard.    No friction rub. No gallop.   Pulmonary:      Effort: Pulmonary effort is normal. No respiratory distress.      Breath sounds: Normal breath sounds. No wheezing or rales.   Chest:      Chest wall: No tenderness.   Abdominal:      General: Bowel sounds are normal. There is no distension.      Palpations: Abdomen is soft.      Tenderness: There is no abdominal tenderness. There is no guarding.   Musculoskeletal:      Cervical back: Normal range of motion and neck supple.   Skin:     General: Skin is warm and dry.   Psychiatric:         Behavior: Behavior normal.         Thought Content: Thought content normal. "         Discharge Disposition:  Home-Health Care [facility]    Discharge Medications:     Discharge Medications        New Medications        Instructions Start Date   HYDROcodone-acetaminophen 7.5-325 MG per tablet  Commonly known as: NORCO   1 tablet, Oral, Every 4 Hours PRN      metoclopramide 10 MG tablet  Commonly known as: REGLAN   10 mg, Oral, Every 6 Hours             Continue These Medications        Instructions Start Date   DULoxetine 20 MG capsule  Commonly known as: CYMBALTA   20 mg, Oral, Daily      lisinopril 10 MG tablet  Commonly known as: PRINIVIL,ZESTRIL   10 mg, Oral, 2 Times Daily             Stop These Medications      fluconazole 200 MG tablet  Commonly known as: DIFLUCAN              Discharge Diet:   Diet Instructions       Diet: Cardiac Diets; Healthy Heart (2-3 Na+); Regular Texture (IDDSI 7); Thin (IDDSI 0)      Discharge Diet: Cardiac Diets    Cardiac Diet: Healthy Heart (2-3 Na+)    Texture: Regular Texture (IDDSI 7)    Fluid Consistency: Thin (IDDSI 0)            Activity at Discharge:   Activity Instructions       Activity as Tolerated            Follow-up Appointments:   Future Appointments   Date Time Provider Department Center   11/1/2023 10:00 AM Justo Avina APRN [facility] None   PCP in 1-2 weeks  Neurology in 6 weeks      This document has been electronically signed by Amrit Briggs MD on September 29, 2023 14:37 CDT      Time: 35 minutes

## 2023-09-29 NOTE — DISCHARGE PLACEMENT REQUEST
"GardunoHanna (57 y.o. Female)       Date of Birth   1965    Social Security Number       Address   [address] [facility] [address]    Home Phone   [phone]    MRN   [id number]       Islam   Roman Catholic    Marital Status   Single                            Admission Date   9/17/23    Admission Type   Emergency    Admitting Provider   Collin Linares MD    Attending Provider   Edis Valero MD    Department, Room/Bed   [address], 432/1       Discharge Date       Discharge Disposition   Home-Health Care [facility]    Discharge Destination                                 Attending Provider: Edis Valero MD    Allergies: No Known Allergies    Isolation: None   Infection: None   Code Status: CPR    Ht: 154.9 cm (61\")   Wt: 71.7 kg (158 lb)    Admission Cmt: None   Principal Problem: Lower extremity weakness [R29.898]                   Active Insurance as of 9/17/2023       Primary Coverage       Payor Plan Insurance Group Employer/Plan Group    [facility] MEDICARE REPLACEMENT [facility] MEDICARE [place]PLACEMENT [id number]       Payor Plan Address Payor Plan Phone Number Payor Plan Fax Number Effective Dates    [address] [address]   1/1/2023 - None Entered    [facility] [id number]         Subscriber Name Subscriber Birth Date Member ID       HANNA GARDUNO ANN 1965 [id number]               Secondary Coverage       Payor Plan Insurance Group Employer/Plan Group    [place]        Payor Plan Address Payor Plan Phone Number Payor Plan Fax Number Effective Dates    [address] [phone]  4/7/2020 - None Entered    [place] [address]         Subscriber Name Subscriber Birth Date Member ID       HANNA GARDUNO ANN 1965 [id number]                     Emergency Contacts        (Rel.) Home Phone Work Phone Mobile Phone    CaseJustina (Sister) [phone] -- --    Jewell Garduno (Relative) [phone] -- [phone] " "   BRENT ANAND (Daughter) [phone] -- [phone]    \"Best Friend\",Jana (Friend) [phone] -- [phone]    LAY BURROWS (Neighbor) [phone] -- --                 Physician Progress Notes (most recent note)        Amrit Briggs MD at 09/28/23 1714              [facility] Medicine Services  INPATIENT PROGRESS NOTE    Length of Stay: 4  Date of Admission: 9/17/2023  Primary Care Physician: Ninfa Bhatti APRN    Subjective   Chief Complaint: Left lower extremity weakness  HPI: Patient states she is feeling well.  States she is able to ambulate some with help and a walker.  Working with therapy.    As of today, 09/28/23  Review of Systems   Constitutional:  Negative for appetite change, chills, fatigue, fever and unexpected weight change.   Respiratory:  Negative for cough, choking, chest tightness, shortness of breath and wheezing.    Cardiovascular:  Negative for chest pain, palpitations and leg swelling.   Gastrointestinal:  Negative for abdominal pain, blood in stool, constipation, diarrhea, nausea and vomiting.   Genitourinary:  Negative for dysuria, flank pain and hematuria.   Neurological:  Negative for dizziness, seizures, syncope, speech difficulty, weakness, light-headedness, numbness and headaches.   Hematological:  Does not bruise/bleed easily.      All pertinent negatives and positives are as above. All other systems have been reviewed and are negative unless otherwise stated.    Objective    Temp:  [97.3 °F (36.3 °C)-97.7 °F (36.5 °C)] 97.7 °F (36.5 °C)  Heart Rate:  [65-76] 70  Resp:  [18] 18  BP: (112-123)/(61-78) 117/71    AM-PAC 6 Clicks Score (PT): 21 (09/28/23 0840)    As of today, 09/28/23  Physical Exam  Vitals reviewed.   Constitutional:       Appearance: She is well-developed.   HENT:      Head: Normocephalic and atraumatic.   Eyes:      Pupils: Pupils are equal, round, and reactive to light.   Cardiovascular:      Rate and " Rhythm: Normal rate and regular rhythm.      Heart sounds: Normal heart sounds. No murmur heard.    No friction rub. No gallop.   Pulmonary:      Effort: Pulmonary effort is normal. No respiratory distress.      Breath sounds: Normal breath sounds. No wheezing or rales.   Chest:      Chest wall: No tenderness.   Abdominal:      General: Bowel sounds are normal. There is no distension.      Palpations: Abdomen is soft.      Tenderness: There is no abdominal tenderness. There is no guarding.   Musculoskeletal:      Cervical back: Normal range of motion and neck supple.   Skin:     General: Skin is warm and dry.   Psychiatric:         Behavior: Behavior normal.         Thought Content: Thought content normal.         Results Review:  I have reviewed the labs, radiology results, and diagnostic studies.    Culture Data:   No results found for: BLOODCX  No results found for: URINECX  No results found for: RESPCX  No results found for: WOUNDCX  No results found for: STOOLCX  No components found for: BODYFLD    Radiology Data:   Imaging Results (Last 24 Hours)       ** No results found for the last 24 hours. **            I have utilized all available immediate resources to obtain, update, or review the patient's current medications (including all prescriptions, over-the-counter products, herbals, cannabis/cannabidiol products, and vitamin/mineral/dietary (nutritional) supplements).       Assessment/Plan     Active Hospital Problems    Diagnosis     **Lower extremity weakness     Depression     Adjustment disorder with depressed mood     Cannabis abuse     Positive urine drug screen for methamphetamine        Plan:  1.  Left lower extremity weakness: Work-up negative.  Improving.  Continue therapy.  Placement pending.  2.  Suicidal ideation: Resolved.  Cleared by psych.  3.  History of polysubstance abuse: Counseled.  4.  Hypertension: Continue current treatment.  5.  DVT prophylaxis: Lovenox.      Medical Decision  "Making  Number and Complexity of problems: 2 highly complex and two moderately complex medical problems    Conditions and Status:        Condition is improving.     Discussed with: Patient and nursing     Treatment Plan  As above    Care Planning  Shared decision making: Patient in agreement plan of care  Code status and discussions: Full code    Disposition  Social Determinants of Health that impact treatment or disposition: None  I expect the patient to be discharged to rehab versus home tomorrow.      The patient was evaluated during the global COVID-19 pandemic, and the diagnosis was suspected/considered upon their initial presentation.  Evaluation, treatment, and testing were consistent with current guidelines for patients who present with complaints or symptoms that may be related to COVID-19.    I confirmed that the patient's Advance Care Plan is present, code status is documented, or surrogate decision maker is listed in the patient's medical record.        This document has been electronically signed by Amrit Briggs MD on 2023 17:14 CDT        Electronically signed by Amrit Briggs MD at 23 1716        [address] [address]  Dept. Phone:  [phone]  Dept. Fax:   Date Ordered: Sep 29, 2023         Patient:  Hanna Escalante MRN:  [id number]   [address]  [facility] [address] :  1965  SSN:    Phone: [phone] Sex:  F     Weight: 71.7 kg (158 lb)         Ht Readings from Last 1 Encounters:   23 154.9 cm (61\")         Miscellaneous DME   (Order ID: [id number])    Diagnosis:  Impaired mobility and ADLs (Z74.09,Z78.9 [ICD-10-CM] V49.89 [ICD-9-CM])   Quantity:  1     Type of DME: Rollerator  Length of Need (99 Months = Lifetime): 99 Months = Lifetime        Authorizing Provider's Phone: [phone]  Authorizing Provider:Amrit Briggs MD  Authorizing Provider's NPI: " [id number]  Order Entered By: Amrit Briggs MD 9/29/2023  4:46 PM     Electronically signed by: Amrit Briggs MD 9/29/2023  4:46 PM

## 2023-09-29 NOTE — PLAN OF CARE
Goal Outcome Evaluation:  Plan of Care Reviewed With: patient        Progress: improving  Outcome Evaluation: OT tx complete this date. Sup>sit-Ind. Sit>stand-SBA. Fxl mobility to BR sba/CGA w/ RW. Toilet t/f-CGA. Pericare-Ind. Shower t/f CGA. UB/LB bathing-Mod I. UB/LBdressing, oral care/comb hair-set up. Sit>sup-Ind. Pt w/ all needs in reach upon exit. Cont OT POC.      Anticipated Discharge Disposition (OT): other (see comments), skilled nursing facility, inpatient rehabilitation facility, home with assist, home with home health

## 2023-09-29 NOTE — PLAN OF CARE
Goal Outcome Evaluation:  Plan of Care Reviewed With: patient        Progress: improving  Outcome Evaluation: vss, pain controlled with prn meds, ambulated to bathroom with GB/walker, pt resting between care

## 2023-09-30 ENCOUNTER — READMISSION MANAGEMENT (OUTPATIENT)
Dept: CALL CENTER | Facility: HOSPITAL | Age: 58
End: 2023-09-30
Payer: MEDICARE

## 2023-09-30 VITALS
TEMPERATURE: 98.7 F | HEIGHT: 61 IN | HEART RATE: 67 BPM | SYSTOLIC BLOOD PRESSURE: 116 MMHG | DIASTOLIC BLOOD PRESSURE: 66 MMHG | OXYGEN SATURATION: 94 % | BODY MASS INDEX: 30.38 KG/M2 | WEIGHT: 160.9 LBS | RESPIRATION RATE: 16 BRPM

## 2023-09-30 PROCEDURE — 25010000002 ENOXAPARIN PER 10 MG: Performed by: INTERNAL MEDICINE

## 2023-09-30 RX ORDER — NALOXONE HYDROCHLORIDE 4 MG/.1ML
SPRAY NASAL
Qty: 2 EACH | Refills: 0 | Status: SHIPPED | OUTPATIENT
Start: 2023-09-30

## 2023-09-30 RX ORDER — HYDROCODONE BITARTRATE AND ACETAMINOPHEN 7.5; 325 MG/1; MG/1
1 TABLET ORAL EVERY 4 HOURS PRN
Qty: 18 TABLET | Refills: 0 | Status: SHIPPED | OUTPATIENT
Start: 2023-09-30 | End: 2023-10-03

## 2023-09-30 RX ORDER — METOCLOPRAMIDE 10 MG/1
10 TABLET ORAL EVERY 6 HOURS
Qty: 120 TABLET | Refills: 0 | Status: SHIPPED | OUTPATIENT
Start: 2023-09-30

## 2023-09-30 RX ADMIN — ENOXAPARIN SODIUM 40 MG: 40 INJECTION SUBCUTANEOUS at 08:25

## 2023-09-30 RX ADMIN — DULOXETINE HYDROCHLORIDE 20 MG: 20 CAPSULE, DELAYED RELEASE ORAL at 08:24

## 2023-09-30 RX ADMIN — LISINOPRIL 10 MG: 10 TABLET ORAL at 08:24

## 2023-09-30 RX ADMIN — DOCUSATE SODIUM 50 MG AND SENNOSIDES 8.6 MG 2 TABLET: 8.6; 5 TABLET, FILM COATED ORAL at 08:24

## 2023-09-30 RX ADMIN — METOCLOPRAMIDE 10 MG: 10 TABLET ORAL at 04:42

## 2023-09-30 RX ADMIN — HYDROCODONE BITARTRATE AND ACETAMINOPHEN 1 TABLET: 7.5; 325 TABLET ORAL at 08:24

## 2023-09-30 RX ADMIN — METOCLOPRAMIDE 10 MG: 10 TABLET ORAL at 08:24

## 2023-09-30 RX ADMIN — HYDROCODONE BITARTRATE AND ACETAMINOPHEN 1 TABLET: 7.5; 325 TABLET ORAL at 04:07

## 2023-09-30 NOTE — PROGRESS NOTES
[facility] Medicine Services  INPATIENT PROGRESS NOTE    Length of Stay: 6  Date of Admission: 9/17/2023  Primary Care Physician: Ninfa Bhatti APRN    Subjective   Chief Complaint: Left lower extremity weakness  HPI: Doing okay this morning.  Ready to go home.    As of today, 09/30/23  Review of Systems   Constitutional:  Negative for appetite change, chills, fatigue, fever and unexpected weight change.   Respiratory:  Negative for cough, choking, chest tightness, shortness of breath and wheezing.    Cardiovascular:  Negative for chest pain, palpitations and leg swelling.   Gastrointestinal:  Negative for abdominal pain, blood in stool, constipation, diarrhea, nausea and vomiting.   Genitourinary:  Negative for dysuria, flank pain and hematuria.   Neurological:  Negative for dizziness, seizures, syncope, speech difficulty, weakness, light-headedness, numbness and headaches.   Hematological:  Does not bruise/bleed easily.      All pertinent negatives and positives are as above. All other systems have been reviewed and are negative unless otherwise stated.    Objective    Temp:  [98.7 °F (37.1 °C)-99.3 °F (37.4 °C)] 98.7 °F (37.1 °C)  Heart Rate:  [67] 67  Resp:  [16] 16  BP: (112-116)/(66-67) 116/66    AM-PAC 6 Clicks Score (PT): 20 (09/29/23 2000)    As of today, 09/30/23  Physical Exam  Vitals reviewed.   Constitutional:       Appearance: She is well-developed.   HENT:      Head: Normocephalic and atraumatic.   Eyes:      Pupils: Pupils are equal, round, and reactive to light.   Cardiovascular:      Rate and Rhythm: Normal rate and regular rhythm.      Heart sounds: Normal heart sounds. No murmur heard.    No friction rub. No gallop.   Pulmonary:      Effort: Pulmonary effort is normal. No respiratory distress.      Breath sounds: Normal breath sounds. No wheezing or rales.   Chest:      Chest wall: No tenderness.   Abdominal:      General: Bowel sounds are  normal. There is no distension.      Palpations: Abdomen is soft.      Tenderness: There is no abdominal tenderness. There is no guarding.   Musculoskeletal:      Cervical back: Normal range of motion and neck supple.   Skin:     General: Skin is warm and dry.   Psychiatric:         Behavior: Behavior normal.         Thought Content: Thought content normal.         Results Review:  I have reviewed the labs, radiology results, and diagnostic studies.    Culture Data:   No results found for: BLOODCX  No results found for: URINECX  No results found for: RESPCX  No results found for: WOUNDCX  No results found for: STOOLCX  No components found for: BODYFLD    Radiology Data:   Imaging Results (Last 24 Hours)       ** No results found for the last 24 hours. **            I have utilized all available immediate resources to obtain, update, or review the patient's current medications (including all prescriptions, over-the-counter products, herbals, cannabis/cannabidiol products, and vitamin/mineral/dietary (nutritional) supplements).       Assessment/Plan     Active Hospital Problems    Diagnosis     **Lower extremity weakness     Depression     Adjustment disorder with depressed mood     Cannabis abuse     Positive urine drug screen for methamphetamine        Plan:  1.  Left lower extremity weakness: Work-up negative.  Improving.  Continue therapy.    2.  Suicidal ideation: Resolved.  Cleared by psych.  3.  History of polysubstance abuse: Counseled.  4.  Hypertension: Continue current treatment.  5.  DVT prophylaxis: Lovenox.    Remains stable for discharge.    Medical Decision Making  Number and Complexity of problems: 2 highly complex and two moderately complex medical problems    Conditions and Status:        Condition is improving.     Discussed with: Patient and nursing     Treatment Plan  As above    Care Planning  Shared decision making: Patient in agreement plan of care  Code status and discussions: Full  code    Disposition  Social Determinants of Health that impact treatment or disposition: None  I expect the patient to be discharged to home today.      The patient was evaluated during the global COVID-19 pandemic, and the diagnosis was suspected/considered upon their initial presentation.  Evaluation, treatment, and testing were consistent with current guidelines for patients who present with complaints or symptoms that may be related to COVID-19.    I confirmed that the patient's Advance Care Plan is present, code status is documented, or surrogate decision maker is listed in the patient's medical record.        This document has been electronically signed by Amrit Briggs MD on September 30, 2023 11:59 CDT

## 2023-09-30 NOTE — DISCHARGE PLACEMENT REQUEST
"Garduno, Hanna Cordero (58 y.o. Female)       Date of Birth   1965    Social Security Number       Address   [address] [facility] [address]    Home Phone   [phone]    MRN   [id number]       [employer]   [employer]    Marital Status   Single                            Admission Date   9/17/23    Admission Type   Emergency    Admitting Provider   Collin Linares MD    Attending Provider   Edis Valero MD    Department, Room/Bed   [address], 432/1       Discharge Date       Discharge Disposition   Home-Health Care [facility]    Discharge Destination                                 Attending Provider: Edis Valero MD    Allergies: No Known Allergies    Isolation: None   Infection: None   Code Status: CPR    Ht: 154.9 cm (61\")   Wt: 73 kg (160 lb 14.4 oz)    Admission Cmt: None   Principal Problem: Lower extremity weakness [R29.898]                   Active Insurance as of 9/17/2023       Primary Coverage       Payor Plan Insurance Group Employer/Plan Group    [facility] MEDICARE REPLACEMENT [facility] MEDICARE REPLACEMENT [id number]       Payor Plan Address Payor Plan Phone Number Payor Plan Fax Number Effective Dates    [address] [address]   1/1/2023 - None Entered    [facility] [id number]         Subscriber Name Subscriber Birth Date Member ID       HANNA GARDUNO ANN 1965 [id number]               Secondary Coverage       Payor Plan Insurance Group Employer/Plan Group    [place]        Payor Plan Address Payor Plan Phone Number Payor Plan Fax Number Effective Dates    [address] [phone]  4/7/2020 - None Entered    [place] [address]         Subscriber Name Subscriber Birth Date Member ID       HANNA GARDUNO ANN 1965 [id number]                     Emergency Contacts        (Rel.) Home Phone Work Phone Mobile Phone    CaseJustina (Sister) [phone] -- --    Jewell Garduno (Relative) [phone] -- " "[phone]    BRENT ANAND (Daughter) [phone] -- [phone]    \"Best Friend\"Jana (Friend) [phone] -- [phone]    LAY BURROWS (Neighbor) [phone] -- --                 History & Physical        Collin Linares MD at 09/17/23 2240              [facility] Medicine  HISTORY AND PHYSICAL      Date of Admission: 9/17/2023  Primary Care Physician: Ninfa Bhatti APRN    Subjective     Chief Complaint: LLE weakness    History of Present Illness  Patient with past medical history of COPD, GERD, hypertension, arthritis presents with left lower extremity weakness.  Patient states that lower extremity weakness began 3 days ago.  Patient admits to history of substance abuse, and was reportedly positive for methamphetamines 3 days ago.  Patient seen in emergency room for left lower extremity weakness, and discharged home.  Patient states that left lower extremity weakness has not improved since original ED evaluation.  States pain is 10/10, focused on lower back, with cold chill like radiation to left gluteal region and then down left leg.  Patient also admits to feeling tingling sensation in left lower extremity due to leg pain.  States that leg pain is affecting ambulation.  Denies previous episodes of leg discomfort.  States that leg discomfort started after scratching her back with a \"fork\" 3 days ago.  Denies bowel/bladder dysfunction.  Patient has adequate rectal tone.  Denies that lower extremity weakness is affecting ambulation.  Also states she has not had a bowel movement since Tuesday.  CT abdomen/pelvis shows no constipation, or acute abdominal signs.    Patient has also admitted to suicidal ideation both to emergency room physician Dr. Mckeon and to admitting hospitalist Dr. Linares.  Patient denies plans for suicide, but does have suicidal thoughts acutely.        Review of Systems   Otherwise complete ROS reviewed and negative except as " "mentioned in the HPI.    Past Medical History:   Past Medical History:   Diagnosis Date    Allergic     Arthritis     COPD (chronic obstructive pulmonary disease)     GERD (gastroesophageal reflux disease)     Hypertension     Infectious viral hepatitis     Low back pain      Past Surgical History:  Past Surgical History:   Procedure Laterality Date    CHOLECYSTECTOMY      COLONOSCOPY N/A 1/21/2020    Procedure: COLONOSCOPY;  Surgeon: Joshua Perry MD;  Location: [facility] ENDOSCOPY;  Service: General    HYSTERECTOMY       Social History:  reports that she has been smoking cigarettes. She has been smoking an average of 1 pack per day. She has never used smokeless tobacco. She reports that she does not currently use alcohol. She reports current drug use. Drug: Marijuana.    Family History: family history includes Cancer in her father; Diabetes in her father; Heart disease in her mother; Hypertension in her mother.        Allergies:  No Known Allergies    Medications:  Prior to Admission medications    Medication Sig Start Date End Date Taking? Authorizing Provider   DULoxetine (CYMBALTA) 20 MG capsule Take 1 capsule by mouth Daily.   Yes Emergency, Nurse Epic, RN   fluconazole (DIFLUCAN) 200 MG tablet Take one at the first sign of infection and may repeat in 3 days as needed. 4/7/20  Yes Luisa Hayes APRN   lisinopril (PRINIVIL,ZESTRIL) 10 MG tablet Take 1 tablet by mouth 2 (Two) Times a Day.   Yes Provider, MD Lianet     I have utilized all available immediate resources to obtain, update, and review the patient's current medications.    Objective     Vital Signs: /88 (BP Location: Left arm, Patient Position: Sitting)   Pulse 99   Temp 98 °F (36.7 °C) (Temporal)   Resp 16   Ht 154.9 cm (61\")   Wt 72.6 kg (160 lb)   SpO2 97%   BMI 30.23 kg/m²   Physical Exam  Constitutional:       Appearance: Normal appearance. She is normal weight.   HENT:      Head: Normocephalic and atraumatic.      " Right Ear: Ear canal normal.      Left Ear: Ear canal normal.      Nose: Nose normal.      Mouth/Throat:      Mouth: Mucous membranes are moist.      Pharynx: Oropharynx is clear.   Eyes:      Extraocular Movements: Extraocular movements intact.      Conjunctiva/sclera: Conjunctivae normal.      Pupils: Pupils are equal, round, and reactive to light.   Cardiovascular:      Rate and Rhythm: Normal rate and regular rhythm.      Pulses: Normal pulses.      Heart sounds: Normal heart sounds.   Pulmonary:      Effort: Pulmonary effort is normal.      Breath sounds: Normal breath sounds.   Abdominal:      General: Abdomen is flat. Bowel sounds are normal.      Palpations: Abdomen is soft.   Musculoskeletal:         General: Normal range of motion.      Cervical back: Normal range of motion and neck supple.   Skin:     General: Skin is warm.      Capillary Refill: Capillary refill takes less than 2 seconds.   Neurological:      Mental Status: She is alert and oriented to person, place, and time.      Motor: Weakness present.            Results Reviewed:  Lab Results (last 24 hours)       Procedure Component Value Units Date/Time    Extra Tubes [[id number]] Collected: 09/17/23 1843    Specimen: Blood Updated: 09/17/23 2246    Narrative:      The following orders were created for panel order Extra Tubes.  Procedure                               Abnormality         Status                     ---------                               -----------         ------                     Gold Top - [facility][[id number]]                                   Final result               Gray Top[[id number]]                                         Final result               Light Blue Top[[id number]]                                   Final result                 Please view results for these tests on the individual orders.    Gray Top [[id number]] Collected: 09/17/23 1843    Specimen: Blood Updated: 09/17/23 2246     Extra Tube Hold for add-ons.      Comment: Auto resulted.       Acetaminophen Level [[id number]]  (Normal) Collected: 09/17/23 1843    Specimen: Blood Updated: 09/17/23 2117     Acetaminophen <5.0 mcg/mL     Ethanol [[id number]] Collected: 09/17/23 1843    Specimen: Blood Updated: 09/17/23 2117     Ethanol <10 mg/dL      Ethanol % <0.010 %     Salicylate Level [[id number]]  (Normal) Collected: 09/17/23 1843    Specimen: Blood Updated: 09/17/23 2117     Salicylate <0.3 mg/dL     Gold Top - SST [[id number]] Collected: 09/17/23 1843    Specimen: Blood Updated: 09/17/23 1945     Extra Tube Hold for add-ons.     Comment: Auto resulted.       Light Blue Top [[id number]] Collected: 09/17/23 1843    Specimen: Blood Updated: 09/17/23 1945     Extra Tube Hold for add-ons.     Comment: Auto resulted       Fentanyl, Urine - Urine, Clean Catch [[id number]]  (Normal) Collected: 09/17/23 1847    Specimen: Urine, Clean Catch Updated: 09/17/23 1916     Fentanyl, Urine Negative    Narrative:      Negative Threshold:      Fentanyl 5 ng/mL     The normal value for the drug tested is negative. This report includes final unconfirmed screening results to be used for medical treatment purposes only. Unconfirmed results must not be used for non-medical purposes such as employment or legal testing. Clinical consideration should be applied to any drug of abuse test, particularly when unconfirmed results are used.           Magnesium [[id number]]  (Normal) Collected: 09/17/23 1843    Specimen: Blood Updated: 09/17/23 1912     Magnesium 2.0 mg/dL     Comprehensive Metabolic Panel [[id number]]  (Abnormal) Collected: 09/17/23 1843    Specimen: Blood Updated: 09/17/23 1912     Glucose 108 mg/dL      BUN 22 mg/dL      Creatinine 1.05 mg/dL      Sodium 139 mmol/L      Potassium 4.2 mmol/L      Chloride 103 mmol/L      CO2 26.0 mmol/L      Calcium 9.0 mg/dL      Total Protein 7.0 g/dL      Albumin 4.0 g/dL      ALT (SGPT) 41 U/L      AST (SGOT) 37 U/L      Alkaline Phosphatase 129 U/L       Total Bilirubin 0.5 mg/dL      Globulin 3.0 gm/dL      A/G Ratio 1.3 g/dL      BUN/Creatinine Ratio 21.0     Anion Gap 10.0 mmol/L      eGFR 61.3 mL/min/1.73     Narrative:      GFR Normal >60  Chronic Kidney Disease <60  Kidney Failure <15      CK [[id number]]  (Normal) Collected: 09/17/23 1843    Specimen: Blood Updated: 09/17/23 1912     Creatine Kinase 56 U/L     Urine Drug Screen - Urine, Clean Catch [[id number]]  (Abnormal) Collected: 09/17/23 1847    Specimen: Urine, Clean Catch Updated: 09/17/23 1909     THC, Screen, Urine Positive     Phencyclidine (PCP), Urine Negative     Cocaine Screen, Urine Negative     Methamphetamine, Ur Negative     Opiate Screen Negative     Amphetamine Screen, Urine Negative     Benzodiazepine Screen, Urine Negative     Tricyclic Antidepressants Screen Negative     Methadone Screen, Urine Negative     Barbiturates Screen, Urine Negative     Oxycodone Screen, Urine Negative     Propoxyphene Screen Negative     Buprenorphine, Screen, Urine Negative    Narrative:      Cutoff For Drugs Screened:    Amphetamines               500 ng/ml  Barbiturates               200 ng/ml  Benzodiazepines            150 ng/ml  Cocaine                    150 ng/ml  Methadone                  200 ng/ml  Opiates                    100 ng/ml  Phencyclidine               25 ng/ml  THC                            50 ng/ml  Methamphetamine            500 ng/ml  Tricyclic Antidepressants  300 ng/ml  Oxycodone                  100 ng/ml  Propoxyphene               300 ng/ml  Buprenorphine               10 ng/ml    The normal value for all drugs tested is negative. This report includes unconfirmed screening results, with the cutoff values listed, to be used for medical treatment purposes only.  Unconfirmed results must not be used for non-medical purposes such as employment or legal testing.  Clinical consideration should be applied to any drug of abuse test, particularly when unconfirmed results are used.       Urinalysis, Microscopic Only - Urine, Clean Catch [[id number]]  (Abnormal) Collected: 09/17/23 1847    Specimen: Urine, Clean Catch Updated: 09/17/23 1859     RBC, UA 0-2 /HPF      WBC, UA 0-2 /HPF      Comment: Urine culture not indicated.        Bacteria, UA None Seen /HPF      Squamous Epithelial Cells, UA 0-2 /HPF      Hyaline Casts, UA None Seen /LPF      Methodology Automated Microscopy    Urinalysis With Culture If Indicated - Urine, Clean Catch [[id number]]  (Abnormal) Collected: 09/17/23 1847    Specimen: Urine, Clean Catch Updated: 09/17/23 1858     Color, UA Yellow     Appearance, UA Clear     pH, UA 5.5     Specific Gravity, UA 1.020     Glucose, UA Negative     Ketones, UA Negative     Bilirubin, UA Negative     Blood, UA Negative     Protein, UA Negative     Leuk Esterase, UA Trace     Nitrite, UA Negative     Urobilinogen, UA 1.0 E.U./dL    Narrative:      In absence of clinical symptoms, the presence of pyuria, bacteria, and/or nitrites on the urinalysis result does not correlate with infection.    CBC & Differential [[id number]]  (Abnormal) Collected: 09/17/23 1843    Specimen: Blood Updated: 09/17/23 1855    Narrative:      The following orders were created for panel order CBC & Differential.  Procedure                               Abnormality         Status                     ---------                               -----------         ------                     CBC Auto Differential[[id number]]        Abnormal            Final result                 Please view results for these tests on the individual orders.    CBC Auto Differential [[id number]]  (Abnormal) Collected: 09/17/23 1843    Specimen: Blood Updated: 09/17/23 1855     WBC 8.17 10*3/mm3      RBC 5.42 10*6/mm3      Hemoglobin 16.4 g/dL      Hematocrit 47.4 %      MCV 87.5 fL      MCH 30.3 pg      MCHC 34.6 g/dL      RDW 12.3 %      RDW-SD 39.1 fl      MPV 9.8 fL      Platelets 236 10*3/mm3      Neutrophil % 49.2 %      Lymphocyte % 37.9 %       Monocyte % 8.7 %      Eosinophil % 2.9 %      Basophil % 1.1 %      Immature Grans % 0.2 %      Neutrophils, Absolute 4.01 10*3/mm3      Lymphocytes, Absolute 3.10 10*3/mm3      Monocytes, Absolute 0.71 10*3/mm3      Eosinophils, Absolute 0.24 10*3/mm3      Basophils, Absolute 0.09 10*3/mm3      Immature Grans, Absolute 0.02 10*3/mm3      nRBC 0.0 /100 WBC           Imaging Results (Last 24 Hours)       Procedure Component Value Units Date/Time    CT Abdomen Pelvis Without Contrast [[id number]] Collected: 09/17/23 2057     Updated: 09/17/23 2102    Narrative:      CT ABDOMEN PELVIS WO CONTRAST    HISTORY: Abdominal pain, acute, nonlocalized    COMPARISON: CT abdomen pelvis 9/13/2023    Automated exposure control was also utilized to decrease patient radiation dose.    TECHNIQUE: Images of the abdomen and pelvis were obtained.  No IV contrast was  ministered.  Multiplanar reformatted images were provided for review.      FINDINGS:      Mild subsegmental atelectasis in the middle lobe.    Visualized mediastinal structures are normal.    Osseous structures are age-appropriate.    Liver is normal.  Spleen is unremarkable.  Bilateral kidneys are within normal limits.  No renal calculus or obstructive  uropathy.  Bilateral adrenal glands are normal.  Pancreas is normal.  Cholecystectomy.    No bowel obstruction.    The rectum is normal.  Bladder is normal.    No free air or free fluid.    No lymphadenopathy.          Impression:      1.  No acute inflammatory process throughout the abdomen or pelvis.        CT Head Without Contrast [[id number]] Collected: 09/17/23 1859     Updated: 09/17/23 1902    Narrative:      HEAD CT WITHOUT IV CONTRAST    HISTORY:  Unable to feel legs.    COMPARISON:  None.    TECHNIQUE:  Routine helical imaging through the brain with axial, coronal and sagittal  two-dimensional reformatted images.    FINDINGS:  There is no evidence of acute intracranial hemorrhage, mass effect or midline  shift.  No abnormal extra-axial fluid collection. No areas of abnormal  attenuation within the brain parenchyma to suggest an acute infarction. The  ventricles, cisterns, and sulci are within normal limits in size and  configuration. The visualized skull base structures and paranasal sinuses are  unremarkable. No calvarial fracture or other lesion.      Impression:      No acute intracranial process.          I have personally reviewed and interpreted the radiology studies and ECG obtained at time of admission.     Scheduled Meds:enoxaparin, 40 mg, Subcutaneous, Daily  nicotine, 1 patch, Transdermal, Q24H  senna-docusate sodium, 2 tablet, Oral, BID  sodium chloride, 10 mL, Intravenous, Q12H      Continuous Infusions:lactated ringers, 100 mL/hr      PRN Meds:.  acetaminophen **OR** acetaminophen **OR** acetaminophen    senna-docusate sodium **AND** polyethylene glycol **AND** bisacodyl **AND** bisacodyl    calcium carbonate    Calcium Replacement - Follow Nurse / BPA Driven Protocol    cyclobenzaprine    Magnesium Low Dose Replacement - Follow Nurse / BPA Driven Protocol    melatonin    Morphine **AND** naloxone    Morphine **AND** naloxone    nitroglycerin    ondansetron **OR** ondansetron    Phosphorus Replacement - Follow Nurse / BPA Driven Protocol    Potassium Replacement - Follow Nurse / BPA Driven Protocol    sodium chloride    sodium chloride    SUMAtriptan   Assessment / Plan     Assessment:   Active Hospital Problems    Diagnosis     **Lower extremity weakness      Plan:   Left lower extremity weakness x3 days:  - Given severity of symptoms, need to rule out spinal nerve root compression and/or CVA.  We will therefore order MRI brain, and MRI lumbar spine.  PT OT evaluation during hospitalization.  As needed pain meds.  Start Flexeril 3 times daily.    Suicidal ideation:  - Patient admitted to both Dr. Mckeon emergency room physician and admitting hospitalist Dr. Linares that she suffers from suicidal ideation.  We  will therefore place patient on one-to-one sitter, and order psych consultation in a.m.    Polysubstance abuse:  - Patient positive for methamphetamines 9/13/2023.  Patient's urine drug screen negative for methamphetamines 9/17.  Recommend patient stop using recreational drugs.  Polysubstance abuse anticipatory guidance given by Dr. Linares at time of admission.    Hypertension: Continue home medications    Medical Decision Making  Number and Complexity of problems: See above   Differential Diagnosis: See above    Conditions and Status:        Condition is improving.    I have utilized all available immediate resources to obtain, update, or review the patient's current medications (including all prescriptions, over-the-counter products, herbals, cannabis/cannabidiol products, and vitamin/mineral/dietary (nutritional) supplements).      MDM Data  External documents reviewed: Up-to-date, care everywhere  My EKG interpretation:    My CT interpretation: No  acute findings  Tests considered but not ordered:       Decision rules/scores evaluated (example [id number], Wells, etc):       Discussed with: Patient, nursing staff     Treatment Plan  The above    Care Planning  Shared decision making: Patient, nursing staff, Dr. Linares  Code status and discussions: Full    Disposition  Social Determinants of Health that impact treatment or disposition: None  I expect the patient to be discharged to home or inpatient psychiatry in 2-4 days.     The patient was seen and examined by me on 9/17/2023 at home 2040.    Electronically signed by Collin Linares MD, 09/18/23, 02:35 CDT.               Electronically signed by Collin Linares MD at 09/19/23 0105          Physician Progress Notes (most recent note)        Amrit Briggs MD at 09/28/23 1714              [facility] Medicine Services  INPATIENT PROGRESS NOTE    Length of Stay: 4  Date of Admission: 9/17/2023  Primary Care Physician:  Ninfa Bhatti APRN    Subjective   Chief Complaint: Left lower extremity weakness  HPI: Patient states she is feeling well.  States she is able to ambulate some with help and a walker.  Working with therapy.    As of today, 09/28/23  Review of Systems   Constitutional:  Negative for appetite change, chills, fatigue, fever and unexpected weight change.   Respiratory:  Negative for cough, choking, chest tightness, shortness of breath and wheezing.    Cardiovascular:  Negative for chest pain, palpitations and leg swelling.   Gastrointestinal:  Negative for abdominal pain, blood in stool, constipation, diarrhea, nausea and vomiting.   Genitourinary:  Negative for dysuria, flank pain and hematuria.   Neurological:  Negative for dizziness, seizures, syncope, speech difficulty, weakness, light-headedness, numbness and headaches.   Hematological:  Does not bruise/bleed easily.      All pertinent negatives and positives are as above. All other systems have been reviewed and are negative unless otherwise stated.    Objective    Temp:  [97.3 °F (36.3 °C)-97.7 °F (36.5 °C)] 97.7 °F (36.5 °C)  Heart Rate:  [65-76] 70  Resp:  [18] 18  BP: (112-123)/(61-78) 117/71    AM-PAC 6 Clicks Score (PT): 21 (09/28/23 0840)    As of today, 09/28/23  Physical Exam  Vitals reviewed.   Constitutional:       Appearance: She is well-developed.   HENT:      Head: Normocephalic and atraumatic.   Eyes:      Pupils: Pupils are equal, round, and reactive to light.   Cardiovascular:      Rate and Rhythm: Normal rate and regular rhythm.      Heart sounds: Normal heart sounds. No murmur heard.    No friction rub. No gallop.   Pulmonary:      Effort: Pulmonary effort is normal. No respiratory distress.      Breath sounds: Normal breath sounds. No wheezing or rales.   Chest:      Chest wall: No tenderness.   Abdominal:      General: Bowel sounds are normal. There is no distension.      Palpations: Abdomen is soft.      Tenderness: There is no abdominal  tenderness. There is no guarding.   Musculoskeletal:      Cervical back: Normal range of motion and neck supple.   Skin:     General: Skin is warm and dry.   Psychiatric:         Behavior: Behavior normal.         Thought Content: Thought content normal.         Results Review:  I have reviewed the labs, radiology results, and diagnostic studies.    Culture Data:   No results found for: BLOODCX  No results found for: URINECX  No results found for: RESPCX  No results found for: WOUNDCX  No results found for: STOOLCX  No components found for: BODYFLD    Radiology Data:   Imaging Results (Last 24 Hours)       ** No results found for the last 24 hours. **            I have utilized all available immediate resources to obtain, update, or review the patient's current medications (including all prescriptions, over-the-counter products, herbals, cannabis/cannabidiol products, and vitamin/mineral/dietary (nutritional) supplements).       Assessment/Plan     Active Hospital Problems    Diagnosis     **Lower extremity weakness     Depression     Adjustment disorder with depressed mood     Cannabis abuse     Positive urine drug screen for methamphetamine        Plan:  1.  Left lower extremity weakness: Work-up negative.  Improving.  Continue therapy.  Placement pending.  2.  Suicidal ideation: Resolved.  Cleared by psych.  3.  History of polysubstance abuse: Counseled.  4.  Hypertension: Continue current treatment.  5.  DVT prophylaxis: Lovenox.      Medical Decision Making  Number and Complexity of problems: 2 highly complex and two moderately complex medical problems    Conditions and Status:        Condition is improving.     Discussed with: Patient and nursing     Treatment Plan  As above    Care Planning  Shared decision making: Patient in agreement plan of care  Code status and discussions: Full code    Disposition  Social Determinants of Health that impact treatment or disposition: None  I expect the patient to be  discharged to rehab versus home tomorrow.      The patient was evaluated during the global COVID-19 pandemic, and the diagnosis was suspected/considered upon their initial presentation.  Evaluation, treatment, and testing were consistent with current guidelines for patients who present with complaints or symptoms that may be related to COVID-19.    I confirmed that the patient's Advance Care Plan is present, code status is documented, or surrogate decision maker is listed in the patient's medical record.        This document has been electronically signed by Amrit Briggs MD on 2023 17:14 CDT        Electronically signed by Amrit Briggs MD at 23 [phone]          Physical Therapy Notes (most recent note)        Aida Salvador PTA at 23 1415  Version 1 of 1         Acute Care - Physical Therapy Treatment Note  [facility]     Patient Name: Hanna Escalante  : 1965  MRN: [id number]  Today's Date: 2023      Visit Dx:     ICD-10-CM ICD-9-CM   1. Weakness of left lower extremity  R29.898 729.89   2. Impaired functional mobility, balance, gait, and endurance [Z74.09 (ICD-10-CM)]  Z74.09 V49.89   3. Impaired mobility and ADLs [Z74.09, Z78.9 (ICD-10-CM)]  Z74.09 V49.89    Z78.9      Patient Active Problem List   Diagnosis    Screening for malignant neoplasm of colon    Neck pain    Tobacco dependence    Insomnia    Back pain    Lower extremity weakness    Depression    Adjustment disorder with depressed mood    Cannabis abuse    Positive urine drug screen for methamphetamine     Past Medical History:   Diagnosis Date    Allergic     Arthritis     COPD (chronic obstructive pulmonary disease)     GERD (gastroesophageal reflux disease)     Hypertension     Infectious viral hepatitis     Low back pain      Past Surgical History:   Procedure Laterality Date    CHOLECYSTECTOMY      COLONOSCOPY N/A 2020    Procedure: COLONOSCOPY;  Surgeon: Joshua Perry MD;   Location: [facility] ENDOSCOPY;  Service: General    HYSTERECTOMY       PT Assessment (last 12 hours)       PT Evaluation and Treatment       Row Name 09/29/23 1309          Physical Therapy Time and Intention    Subjective Information no complaints  -TA     Document Type therapy note (daily note)  -TA     Mode of Treatment individual therapy;physical therapy  -TA       Row Name 09/29/23 1309          General Information    Patient Profile Reviewed yes  -TA     Existing Precautions/Restrictions fall  -TA       Row Name 09/29/23 1309          Pain    Pretreatment Pain Rating 0/10 - no pain  -TA     Posttreatment Pain Rating 0/10 - no pain  -TA       Row Name 09/29/23 1309          Cognition    Orientation Status (Cognition) oriented x 4  -TA     Personal Safety Interventions fall prevention program maintained;gait belt;muscle strengthening facilitated;nonskid shoes/slippers when out of bed;supervised activity  -TA       Row Name 09/29/23 1309          Bed Mobility    Rolling Left [place] (Bed Mobility) modified independence  -TA     Scooting/Bridging [place] (Bed Mobility) modified independence  -TA     Supine-Sit [place] (Bed Mobility) modified independence  -TA     Sit-Supine [place] (Bed Mobility) not tested  -TA     Assistive Device (Bed Mobility) bed rails;head of bed elevated  -TA       Row Name 09/29/23 1309          Transfers    Transfers sit-stand transfer;stand-sit transfer  -TA       Row Name 09/29/23 1309          Sit-Stand Transfer    Sit-Stand [place] (Transfers) standby assist  -TA     Assistive Device (Sit-Stand Transfers) walker, front-wheeled  -TA       Row Name 09/29/23 1309          Stand-Sit Transfer    Stand-Sit [place] (Transfers) standby assist  -TA     Assistive Device (Stand-Sit Transfers) walker, front-wheeled  -TA       Row Name 09/29/23 1309          Toilet Transfer    Type (Toilet Transfer) sit-stand;stand-sit  -TA     [place] Level (Toilet Transfer)  standby assist;contact guard  -TA     Assistive Device (Toilet Transfer) walker, front-wheeled  -TA       Row Name 09/29/23 1309          Gait/Stairs (Locomotion)    [place] Level (Gait) contact guard  -TA     Assistive Device (Gait) walker, front-wheeled  -TA     Distance in Feet (Gait) 160`  -TA     Pattern (Gait) step-to  -TA     Deviations/Abnormal Patterns (Gait) stride length decreased;gait speed decreased;ataxic  -TA     Bilateral Gait Deviations forward flexed posture  -TA       Row Name 09/29/23 1309          Plan of Care Review    Plan of Care Reviewed With patient  -TA     Progress improving  -TA     Outcome Evaluation pt sup>sit with independence, sit<>stand with SBA, pt ambulated 160` with RW & CGA. pt would benefit from 24/7 care & continued PT services  -TA       Row Name 09/29/23 1309          Vital Signs    Pre Systolic BP Rehab 137  -TA     Pre Treatment Diastolic BP 77  -TA     Post Systolic BP Rehab 146  -TA     Post Treatment Diastolic BP 80  -TA     Pretreatment Heart Rate (beats/min) 67  -TA     Posttreatment Heart Rate (beats/min) 59  -TA     Pre SpO2 (%) 98  -TA     O2 Delivery Pre Treatment room air  -TA     Post SpO2 (%) 97  -TA     O2 Delivery Post Treatment room air  -TA     Pre Patient Position Supine  -TA     Post Patient Position Sitting  -TA       Row Name 09/29/23 1309          Positioning and Restraints    Pre-Treatment Position in bed  -TA     Post Treatment Position bathroom  -TA     In Chair sitting;call light within reach;notified nsg  -TA       Row Name 09/29/23 1309          Therapy Assessment/Plan (PT)    Comment, Therapy Assessment/Plan (PT) continue  -TA       Row Name 09/29/23 1303          Bed Mobility Goal 1 (PT)    Activity/Assistive Device (Bed Mobility Goal 1, PT) sit to supine/supine to sit  -TA     [place] Level/Cues Needed (Bed Mobility Goal 1, PT) independent  -TA     Time Frame (Bed Mobility Goal 1, PT) by discharge  -TA     Progress/Outcomes (Bed  Mobility Goal 1, PT) goal not met;continuing progress toward goal  -TA       Row Name 09/29/23 1309          Transfer Goal 1 (PT)    Activity/Assistive Device (Transfer Goal 1, PT) sit-to-stand/stand-to-sit;bed-to-chair/chair-to-bed;walker, rolling  -TA     [place] Level/Cues Needed (Transfer Goal 1, PT) modified independence  -TA     Strategies/Barriers (Transfers Goal 1, PT) Strong LEs, decreased sensation, ataxic gait.  -TA     Progress/Outcome (Transfer Goal 1, PT) goal not met;continuing progress toward goal  -TA       Row Name 09/29/23 1309          Gait Training Goal 1 (PT)    Activity/Assistive Device (Gait Training Goal 1, PT) walker, rolling  -TA     [place] Level (Gait Training Goal 1, PT) modified independence  -TA     Distance (Gait Training Goal 1, PT) 50'x2.  -TA     Time Frame (Gait Training Goal 1, PT) by discharge  -TA     Strategies/Barriers (Gait Training Goal 1, PT) Strong LEs, decreased sensation, ataxic gait.  -TA     Progress/Outcome (Gait Training Goal 1, PT) goal not met;continuing progress toward goal  -TA       Row Name 09/29/23 1309          Stairs Goal 1 (PT)    Activity/Assistive Device (Stairs Goal 1, PT) ascending stairs;descending stairs  -TA     [place] Level/Cues Needed (Stairs Goal 1, PT) modified independence  -TA     Number of Stairs (Stairs Goal 1, PT) 1 step, may use FWW.  -TA     Time Frame (Stairs Goal 1, PT) by discharge  -TA     Strategies/Barriers (Stairs Goal 1, PT) Strong LEs, decreased sensation, ataxic gait.  -TA     Progress/Outcome (Stairs Goal 1, PT) goal not met  -TA               User Key  (r) = Recorded By, (t) = Taken By, (c) = Cosigned By      Initials Name Provider Type    Aida Chicas PTA Physical Therapist Assistant                    Physical Therapy Education       Title: PT OT SLP Therapies (In Progress)       Topic: Physical Therapy (In Progress)       Point: Mobility training (In Progress)       Learning Progress Summary              Patient Acceptance, E, NR by  at 9/20/2023 1052    Comment: PT POC, hand  placenent with transfers, rehab process.                         Point: Home exercise program (Not Started)       Learner Progress:  Not documented in this visit.              Point: Body mechanics (Not Started)       Learner Progress:  Not documented in this visit.              Point: Precautions (Not Started)       Learner Progress:  Not documented in this visit.                              User Key       Initials Effective Dates Name Provider Type Discipline     07/11/23 -  Raul Leung, PT Physical Therapist PT                  PT Recommendation and Plan     Plan of Care Reviewed With: patient  Progress: improving  Outcome Evaluation: pt sup>sit with independence, sit<>stand with SBA, pt ambulated 160` with RW & CGA. pt would benefit from 24/7 care & continued PT services   Outcome Measures       Row Name 09/29/23 1400             How much help from another person do you currently need...    Turning from your back to your side while in flat bed without using bedrails? 4  -TA      Moving from lying on back to sitting on the side of a flat bed without bedrails? 4  -TA      Moving to and from a bed to a chair (including a wheelchair)? 3  -TA      Standing up from a chair using your arms (e.g., wheelchair, bedside chair)? 3  -TA      Climbing 3-5 steps with a railing? 3  -TA      To walk in hospital room? 3  -TA      AM-PAC 6 Clicks Score (PT) 20  -TA         Functional Assessment    Outcome Measure Options AM-PAC 6 Clicks Basic Mobility (PT)  -TA                User Key  (r) = Recorded By, (t) = Taken By, (c) = Cosigned By      Initials Name Provider Type    TA Aida Salvador, PTA Physical Therapist Assistant                     Time Calculation:    PT Charges       Row Name 09/29/23 1414             Time Calculation    Start Time 1309  -TA      Stop Time 1326  -TA      Time Calculation (min) 17 min  -TA      PT Received On  23  -TA         Time Calculation- PT    Total Timed Code Minutes- PT 17 minute(s)  -TA         Timed Charges    81256 - Gait Training Minutes  17  -TA         Total Minutes    Timed Charges Total Minutes 17  -TA       Total Minutes 17  -TA                User Key  (r) = Recorded By, (t) = Taken By, (c) = Cosigned By      Initials Name Provider Type    Aida Chicas PTA Physical Therapist Assistant                  Therapy Charges for Today       Code Description Service Date Service Provider Modifiers Qty    85807001267 HC GAIT TRAINING EA 15 MIN 2023 Aida Salvador PTA GP 1            PT G-Codes  Outcome Measure Options: AM-PAC 6 Clicks Basic Mobility (PT)  AM-PAC 6 Clicks Score (PT): 20  AM-PAC 6 Clicks Score (OT): 24    Aida Salvador PTA  2023      Electronically signed by Aida Salvador PTA at 23 1415          Occupational Therapy Notes (most recent note)        Theodora Lewis COTA at 23 1140          Patient Name: Hanna Escalante  : 1965    MRN: [id number]                              Today's Date: 2023       Admit Date: 2023    Visit Dx:     ICD-10-CM ICD-9-CM   1. Weakness of left lower extremity  R29.898 729.89   2. Impaired functional mobility, balance, gait, and endurance [Z74.09 (ICD-10-CM)]  Z74.09 V49.89   3. Impaired mobility and ADLs [Z74.09, Z78.9 (ICD-10-CM)]  Z74.09 V49.89    Z78.9      Patient Active Problem List   Diagnosis    Screening for malignant neoplasm of colon    Neck pain    Tobacco dependence    Insomnia    Back pain    Lower extremity weakness    Depression    Adjustment disorder with depressed mood    Cannabis abuse    Positive urine drug screen for methamphetamine     Past Medical History:   Diagnosis Date    Allergic     Arthritis     COPD (chronic obstructive pulmonary disease)     GERD (gastroesophageal reflux disease)     Hypertension     Infectious viral hepatitis     Low back pain      Past Surgical History:    Procedure Laterality Date    CHOLECYSTECTOMY      COLONOSCOPY N/A 1/21/2020    Procedure: COLONOSCOPY;  Surgeon: Joshua Perry MD;  Location: [facility] ENDOSCOPY;  Service: General    HYSTERECTOMY        General Information       Row Name 09/29/23 1045          OT Time and Intention    Document Type therapy note (daily note)  -KD     Mode of Treatment individual therapy;occupational therapy  -KD       Row Name 09/29/23 1045          General Information    Patient Profile Reviewed yes  -KD     Existing Precautions/Restrictions fall  -KD       Row Name 09/29/23 1045          Cognition    Orientation Status (Cognition) oriented x 4  -KD       Row Name 09/29/23 1045          Safety Issues, Functional Mobility    Impairments Affecting Function (Mobility) endurance/activity tolerance;coordination;balance;pain  -KD               User Key  (r) = Recorded By, (t) = Taken By, (c) = Cosigned By      Initials Name Provider Type    Theodora Ruiz COTA Occupational Therapist Assistant                     Mobility/ADL's       Row Name 09/29/23 1045          Bed Mobility    Supine-Sit [place] (Bed Mobility) independent  -KD     Sit-Supine [place] (Bed Mobility) independent  -KD       Row Name 09/29/23 1045          Sit-Stand Transfer    Sit-Stand [place] (Transfers) standby assist  -KD     Assistive Device (Sit-Stand Transfers) walker, front-wheeled  -KD       Row Name 09/29/23 1045          Stand-Sit Transfer    Stand-Sit [place] (Transfers) standby assist  -KD     Assistive Device (Stand-Sit Transfers) walker, front-wheeled  -KD       Row Name 09/29/23 1045          Toilet Transfer    [place] Level (Toilet Transfer) standby assist;contact guard  -KD     Assistive Device (Toilet Transfer) walker, front-wheeled  -KD       Row Name 09/29/23 1045          Functional Mobility    Functional Mobility- Ind. Level contact guard assist  -KD     Functional Mobility- Device walker, front-wheeled  -KD      Functional Mobility-Distance (Feet) 10  -KD       Row Name 09/29/23 1045          Activities of Daily Living    BADL Assessment/Intervention bathing;upper body dressing;lower body dressing;grooming;toileting  -KD       Row Name 09/29/23 1045          Lower Body Dressing Assessment/Training    [place] Level (Lower Body Dressing) lower body dressing skills;doff;don;socks;supervision  -KD     Position (Lower Body Dressing) edge of bed sitting  -KD       Row Name 09/29/23 1045          Toileting Assessment/Training    [place] Level (Toileting) toileting skills;adjust/manage clothing;perform perineal hygiene;independent  -KD     Assistive Devices (Toileting) commode  -KD     Position (Toileting) unsupported sitting  -KD       Row Name 09/29/23 1045          Bathing Assessment/Intervention    [place] Level (Bathing) bathing skills;lower body;upper body;standby assist  -KD     Assistive Devices (Bathing) bath mitt;grab bar, tub/shower;long-handled sponge;shower chair  -KD     Position (Bathing) unsupported sitting  -KD       Row Name 09/29/23 1045          Upper Body Dressing Assessment/Training    [place] Level (Upper Body Dressing) upper body dressing skills;doff;don;set up  -KD     Position (Upper Body Dressing) edge of bed sitting  -[facility] Name 09/29/23 1045          Grooming Assessment/Training    [place] Level (Grooming) grooming skills;hair care, combing/brushing;wash face, hands;oral care regimen;set up  -KD     Position (Grooming) unsupported sitting  -KD               User Key  (r) = Recorded By, (t) = Taken By, (c) = Cosigned By      Initials Name Provider Type     Theodora Lewis COTA Occupational Therapist Assistant                   Obj/Interventions    No documentation.                  Goals/Plan       Row Name 09/29/23 1045          Transfer Goal 1 (OT)    Activity/Assistive Device (Transfer Goal 1, OT) toilet  -KD     [place] Level/Cues Needed (Transfer Goal 1,  OT) supervision required  -KD     Time Frame (Transfer Goal 1, OT) long term goal (LTG);by discharge  -KD     Progress/Outcome (Transfer Goal 1, OT) goal not met  -KD       Row Name 09/29/23 1045          Bathing Goal 1 (OT)    Activity/Device (Bathing Goal 1, OT) lower body bathing  -KD     [place] Level/Cues Needed (Bathing Goal 1, OT) supervision required  -KD     Time Frame (Bathing Goal 1, OT) long term goal (LTG);by discharge  -KD     Progress/Outcomes (Bathing Goal 1, OT) goal met  -KD       Row Name 09/29/23 1045          Dressing Goal 1 (OT)    Activity/Device (Dressing Goal 1, OT) lower body dressing  -KD     [place]/Cues Needed (Dressing Goal 1, OT) supervision required  -KD     Time Frame (Dressing Goal 1, OT) long term goal (LTG);by discharge  -KD     Progress/Outcome (Dressing Goal 1, OT) goal met  -KD       Row Name 09/29/23 1045          Problem Specific Goal 1 (OT)    Problem Specific Goal 1 (OT) Pt will tolerate at least 4 continuous minutes of standing ADL with SPV or less and zero LOB for increased balance required for ADLs and mobility.  -KD     Time Frame (Problem Specific Goal 1, OT) long term goal (LTG);by discharge  -KD     Progress/Outcome (Problem Specific Goal 1, OT) goal not met  -KD               User Key  (r) = Recorded By, (t) = Taken By, (c) = Cosigned By      Initials Name Provider Type     Theodora Lewis COTA Occupational Therapist Assistant                   Clinical Impression       Row Name 09/29/23 1045          Pain Assessment    Pretreatment Pain Rating 0/10 - no pain  -KD     Posttreatment Pain Rating 0/10 - no pain  -KD       Row Name 09/29/23 1045          Plan of Care Review    Plan of Care Reviewed With patient  -KD     Progress improving  -KD     Outcome Evaluation OT tx complete this date. Sup>sit-Ind. Sit>stand-SBA. Fxl mobility to BR sba/CGA w/ RW. Toilet t/f-CGA. Pericare-Ind. Shower t/f CGA. UB/LB bathing-Mod I. UB/LBdressing, oral care/comb hair-set  up. Sit>sup-Ind. Pt w/ all needs in reach upon exit. Cont OT POC.  -KD       Row Name 09/29/23 1045          Therapy Assessment/Plan (OT)    Therapy Frequency (OT) other (see comments)  5-7 d/wk  -KD       Row Name 09/29/23 1045          Therapy Plan Review/Discharge Plan (OT)    Anticipated Discharge Disposition (OT) other (see comments);skilled nursing facility;inpatient rehabilitation facility;home with assist;home with home health  -KD       Row Name 09/29/23 1045          Vital Signs    Pre Systolic BP Rehab 127  -KD     Pre Treatment Diastolic BP 76  -KD     Pretreatment Heart Rate (beats/min) 75  -KD     Pre SpO2 (%) 95  -KD     O2 Delivery Pre Treatment room air  -KD     Pre Patient Position Supine  -KD     Intra Patient Position Standing  -KD     Post Patient Position Supine  -KD       Row Name 09/29/23 1045          Positioning and Restraints    Pre-Treatment Position in bed  -KD     Post Treatment Position bed  -KD     In Bed fowlers;call light within reach;encouraged to call for assist;exit alarm on  -KD               User Key  (r) = Recorded By, (t) = Taken By, (c) = Cosigned By      Initials Name Provider Type    KD Theodora Lewis COTA Occupational Therapist Assistant                   Outcome Measures       Row Name 09/29/23 1045          How much help from another is currently needed...    Putting on and taking off regular lower body clothing? 4  -KD     Bathing (including washing, rinsing, and drying) 4  -KD     Toileting (which includes using toilet bed pan or urinal) 4  -KD     Putting on and taking off regular upper body clothing 4  -KD     Taking care of personal grooming (such as brushing teeth) 4  -KD     Eating meals 4  -KD     AM-PAC 6 Clicks Score (OT) 24  -KD       Row Name 09/29/23 0705          How much help from another person do you currently need...    Turning from your back to your side while in flat bed without using bedrails? 3  -HE     Moving from lying on back to sitting on the  side of a flat bed without bedrails? 3  -HE     Moving to and from a bed to a chair (including a wheelchair)? 3  -HE     Standing up from a chair using your arms (e.g., wheelchair, bedside chair)? 3  -HE     Climbing 3-5 steps with a railing? 3  -HE     To walk in hospital room? 3  -HE     AM-PAC 6 Clicks Score (PT) 18  -HE     Highest level of mobility 6 --> Walked 10 steps or more  -HE               User Key  (r) = Recorded By, (t) = Taken By, (c) = Cosigned By      Initials Name Provider Type    Theodora Ruiz COTA Occupational Therapist Assistant    Louise Marquez, RN Registered Nurse                    Occupational Therapy Education       Title: PT OT SLP Therapies (In Progress)       Topic: Occupational Therapy (In Progress)       Point: ADL training (Done)       Description:   Instruct learner(s) on proper safety adaptation and remediation techniques during self care or transfers.   Instruct in proper use of assistive devices.                  Learning Progress Summary             Patient Acceptance, E,TB, VU by CM at 9/20/2023 0497    Comment: OT POC, Role of OT, d/c recommendations                         Point: Home exercise program (Not Started)       Description:   Instruct learner(s) on appropriate technique for monitoring, assisting and/or progressing therapeutic exercises/activities.                  Learner Progress:  Not documented in this visit.              Point: Precautions (Not Started)       Description:   Instruct learner(s) on prescribed precautions during self-care and functional transfers.                  Learner Progress:  Not documented in this visit.              Point: Body mechanics (Not Started)       Description:   Instruct learner(s) on proper positioning and spine alignment during self-care, functional mobility activities and/or exercises.                  Learner Progress:  Not documented in this visit.                              User Key       Initials Effective  Dates Name Provider Type Discipline     11/18/22 -  Perlita Rich OT Occupational Therapist OT                  OT Recommendation and Plan  Therapy Frequency (OT): other (see comments) (5-7 d/wk)  Plan of Care Review  Plan of Care Reviewed With: patient  Progress: improving  Outcome Evaluation: OT tx complete this date. Sup>sit-Ind. Sit>stand-SBA. Fxl mobility to BR sba/CGA w/ RW. Toilet t/f-CGA. Pericare-Ind. Shower t/f CGA. UB/LB bathing-Mod I. UB/LBdressing, oral care/comb hair-set up. Sit>sup-Ind. Pt w/ all needs in reach upon exit. Cont OT POC.     Time Calculation:         Time Calculation- OT       Row Name 09/29/23 1045             Time Calculation- OT    OT Start Time 1045  -KD      OT Stop Time 1127  -KD      OT Time Calculation (min) 42 min  -KD      Total Timed Code Minutes- OT 42 minute(s)  -KD      OT Received On 09/29/23  -KD         Timed Charges    00484 - OT Self Care/Mgmt Minutes 42  -KD         Total Minutes    Timed Charges Total Minutes 42  -KD       Total Minutes 42  -KD                User Key  (r) = Recorded By, (t) = Taken By, (c) = Cosigned By      Initials Name Provider Type    KD Theodora Lewis COTA Occupational Therapist Assistant                  Therapy Charges for Today       Code Description Service Date Service Provider Modifiers Qty    74498769838 HC OT THER PROC EA 15 MIN 9/28/2023 Theodora Lewis COTA GO 2    79320753071 HC OT SELF CARE/MGMT/TRAIN EA 15 MIN 9/29/2023 Theodora Lewis COTA GO 3                 LINDSEY Powers  9/29/2023    Electronically signed by Theodora Lewis COTA at 09/29/23 1140

## 2023-09-30 NOTE — PLAN OF CARE
Goal Outcome Evaluation:  Plan of Care Reviewed With: patient        Progress: improving    Pain medication given twice with relief voiced. Rested well through the shift.

## 2023-09-30 NOTE — OUTREACH NOTE
Prep Survey      Flowsheet Row Responses   [facility] facility patient discharged from? [place]   Is LACE score < 7 ? No   Eligibility Not Eligible  [no9t callinjg mad anymore]   What are the reasons patient is not eligible? Other   Does the patient have one of the following disease processes/diagnoses(primary or secondary)? Other   Prep survey completed? Yes            JORGE ADAMS - Registered Nurse

## 2023-10-02 NOTE — DISCHARGE PLACEMENT REQUEST
"Hanna Garduno (58 y.o. Female)       Date of Birth   1965    Social Security Number       Address   [address] [facility] [address]    Home Phone   [phone]    MRN   [id number]       Latter day   Mosque    Marital Status   Single                            Admission Date   9/17/23    Admission Type   Emergency    Admitting Provider   Collin Linares MD    Attending Provider       Department, Room/Bed   [address], 432/1       Discharge Date   9/30/2023    Discharge Disposition   [place] [facility] [place]    [place]charge Destination                                 Attending Provider: (none)   Allergies: No Known Allergies    Isolation: None   Infection: None   Code Status: Prior    Ht: 154.9 cm (61\")   Wt: 73 kg (160 lb 14.4 oz)    Admission Cmt: None   Principal Problem: Lower extremity weakness [R29.898]                   Active Insurance as of 9/17/2023       Primary Coverage       Payor Plan Insurance Group Employer/Plan Group    [facility] MEDICARE REPLACEMENT [facility] MEDICARE REPLACEMENT [id number]       Payor Plan Address Payor Plan Phone Number Payor Plan Fax Number Effective Dates    [address] [address]   1/1/2023 - None Entered    [facility] [id number]         Subscriber Name Subscriber Birth Date Member ID       HANNA GARDUNO ANN 1965 [id number]               Secondary Coverage       Payor Plan Insurance Group Employer/Plan Group    [place]        Payor Plan Address Payor Plan Phone Number Payor Plan Fax Number Effective Dates    [address] [phone]  4/7/2020 - None Entered    [facility] [id number]         Subscriber Name Subscriber Birth Date Member ID       DEREK GARDUNOI ANN 1965 [id number]                     Emergency Contacts        (Rel.) Home Phone Work Phone Mobile Phone    CaseJustina (Sister) [phone] -- --    Jewell Garduno (Relative) [phone] -- [phone]    " "BRENT ANAND (Daughter) [phone] -- [phone]    \"Best Friend\",Jana (Friend) [phone] -- [phone]    LAY BURROWS (Neighbor) [phone] -- --               [address] [address]  Phone:  [phone]  Fax:   Date: Sep 29, 2023      Ambulatory Referral to Home Health     Patient:  Hanna Escalante MRN:  [id number]   [address]  [facility] [address] :  1965  SSN:    Phone: [phone] Sex:  F      INSURANCE PAYOR PLAN GROUP # SUBSCRIBER ID   Primary:  Secondary:    [facility] MEDICARE REPLACEMENT  [place] [id number]  [id number]    [id number]  [id number]      Referring Provider Information:  TIFF OKEEFE Phone: [phone] Fax: [phone]       Referral Information:   # Visits:  999 Referral Type: Home Health [42]   Urgency:  Routine Referral Reason: Specialty Services Required   Start Date: Sep 29, 2023 End Date:  To be determined by Insurer   Diagnosis: Impaired mobility and ADLs (Z74.09,Z78.9 [ICD-10-CM] V49.89 [ICD-9-CM])      Refer to Dept:   Refer to Provider:   Refer to Provider Phone:   Refer to Facility:       Face to Face Visit Date: 2023  Follow-up provider for Plan of Care? I treated the patient in an acute care facility and will not continue treatment after discharge.  Follow-up provider: SHANNAN SHEPPARD [[id number]]  Reason/Clinical Findings: Deconditioning  Describe mobility limitations that make leaving home difficult: Deconditioning  Nursing/Therapeutic Services Requested: Physical Therapy  Nursing/Therapeutic Services Requested: Occupational Therapy  PT orders: Therapeutic exercise  Occupational orders: Activities of daily living  Frequency: 1 Week 1     This document serves as a request of services and does not constitute Insurance authorization or approval of services.  To determine eligibility, please contact the members Insurance carrier to verify and review " coverage.     If you have medical questions regarding this request for services. Please contact [address] at [phone] during normal business hours.        Authorizing Provider:Amrit Briggs MD  Authorizing Provider's NPI: [id number]  Order Entered By: Amrit Briggs MD 9/29/2023  2:33 PM     Electronically signed by: Amrit Briggs MD 9/29/2023  2:33 PM

## (undated) DEVICE — CANN SMPL SOFTECH BIFLO ETCO2 A/M 7FT